# Patient Record
Sex: FEMALE | Race: WHITE | NOT HISPANIC OR LATINO | Employment: FULL TIME | URBAN - METROPOLITAN AREA
[De-identification: names, ages, dates, MRNs, and addresses within clinical notes are randomized per-mention and may not be internally consistent; named-entity substitution may affect disease eponyms.]

---

## 2017-02-22 ENCOUNTER — CLINICAL SUPPORT (OUTPATIENT)
Dept: FAMILY MEDICINE CLINIC | Facility: CLINIC | Age: 32
End: 2017-02-22

## 2017-02-22 DIAGNOSIS — E53.8 VITAMIN B12 DEFICIENCY: Primary | ICD-10-CM

## 2017-02-22 PROCEDURE — 96372 THER/PROPH/DIAG INJ SC/IM: CPT | Performed by: FAMILY MEDICINE

## 2017-02-22 RX ORDER — CYANOCOBALAMIN 1000 UG/ML
1000 INJECTION, SOLUTION INTRAMUSCULAR; SUBCUTANEOUS
Status: DISCONTINUED | OUTPATIENT
Start: 2017-02-22 | End: 2018-05-16 | Stop reason: HOSPADM

## 2017-02-22 RX ADMIN — CYANOCOBALAMIN 1000 MCG: 1000 INJECTION, SOLUTION INTRAMUSCULAR; SUBCUTANEOUS at 12:02

## 2017-05-11 ENCOUNTER — OFFICE VISIT (OUTPATIENT)
Dept: FAMILY MEDICINE CLINIC | Facility: CLINIC | Age: 32
End: 2017-05-11

## 2017-05-11 VITALS — SYSTOLIC BLOOD PRESSURE: 120 MMHG | DIASTOLIC BLOOD PRESSURE: 78 MMHG | BODY MASS INDEX: 23.56 KG/M2 | WEIGHT: 133 LBS

## 2017-05-11 DIAGNOSIS — E03.9 HYPOTHYROIDISM, UNSPECIFIED TYPE: ICD-10-CM

## 2017-05-11 DIAGNOSIS — K21.9 GASTROESOPHAGEAL REFLUX DISEASE WITHOUT ESOPHAGITIS: ICD-10-CM

## 2017-05-11 DIAGNOSIS — G43.009 NONINTRACTABLE MIGRAINE, UNSPECIFIED MIGRAINE TYPE: Primary | ICD-10-CM

## 2017-05-11 PROCEDURE — 99214 OFFICE O/P EST MOD 30 MIN: CPT | Performed by: PHYSICIAN ASSISTANT

## 2017-05-11 RX ORDER — LEVOTHYROXINE SODIUM 0.07 MG/1
75 TABLET ORAL DAILY
Qty: 30 TABLET | Refills: 11 | Status: SHIPPED | OUTPATIENT
Start: 2017-05-11 | End: 2018-05-16 | Stop reason: HOSPADM

## 2017-05-11 RX ORDER — SUMATRIPTAN 100 MG/1
100 TABLET, FILM COATED ORAL ONCE AS NEEDED
Qty: 9 TABLET | Refills: 1 | Status: SHIPPED | OUTPATIENT
Start: 2017-05-11 | End: 2018-05-16 | Stop reason: HOSPADM

## 2017-05-11 RX ORDER — SUMATRIPTAN 100 MG/1
100 TABLET, FILM COATED ORAL ONCE AS NEEDED
Qty: 9 TABLET | Refills: 1 | Status: SHIPPED | OUTPATIENT
Start: 2017-05-11 | End: 2017-05-11 | Stop reason: SDUPTHER

## 2017-05-11 RX ORDER — OMEPRAZOLE 20 MG/1
20 CAPSULE, DELAYED RELEASE ORAL DAILY
Qty: 30 CAPSULE | Refills: 1 | Status: SHIPPED | OUTPATIENT
Start: 2017-05-11 | End: 2018-05-16 | Stop reason: HOSPADM

## 2017-09-06 ENCOUNTER — LAB (OUTPATIENT)
Dept: LAB | Facility: HOSPITAL | Age: 32
End: 2017-09-06

## 2017-09-06 DIAGNOSIS — E03.9 HYPOTHYROIDISM, UNSPECIFIED TYPE: ICD-10-CM

## 2017-09-06 DIAGNOSIS — E03.9 HYPOTHYROIDISM, UNSPECIFIED TYPE: Primary | ICD-10-CM

## 2017-09-06 LAB
T4 FREE SERPL-MCNC: 1.32 NG/DL (ref 0.89–1.76)
TSH SERPL DL<=0.05 MIU/L-ACNC: 2.45 MIU/ML (ref 0.35–5.35)

## 2017-09-06 PROCEDURE — 36415 COLL VENOUS BLD VENIPUNCTURE: CPT

## 2017-09-06 PROCEDURE — 84439 ASSAY OF FREE THYROXINE: CPT | Performed by: NURSE PRACTITIONER

## 2017-09-06 PROCEDURE — 84443 ASSAY THYROID STIM HORMONE: CPT | Performed by: NURSE PRACTITIONER

## 2017-09-11 RX ORDER — AZITHROMYCIN 250 MG/1
TABLET, FILM COATED ORAL
Qty: 5 TABLET | Refills: 0 | Status: SHIPPED | OUTPATIENT
Start: 2017-09-11 | End: 2018-01-10

## 2017-10-04 ENCOUNTER — TRANSCRIBE ORDERS (OUTPATIENT)
Dept: ADMINISTRATIVE | Facility: HOSPITAL | Age: 32
End: 2017-10-04

## 2017-10-04 DIAGNOSIS — N63.0 BREAST LUMP: Primary | ICD-10-CM

## 2017-10-04 DIAGNOSIS — B02.9 HERPES ZOSTER WITHOUT COMPLICATION: Primary | ICD-10-CM

## 2017-10-04 RX ORDER — FAMCICLOVIR 500 MG/1
500 TABLET ORAL 3 TIMES DAILY
Qty: 21 TABLET | Refills: 0 | Status: SHIPPED | OUTPATIENT
Start: 2017-10-04 | End: 2017-10-04 | Stop reason: SDUPTHER

## 2017-10-04 RX ORDER — ACYCLOVIR 50 MG/G
OINTMENT TOPICAL
Qty: 15 G | Refills: 0 | Status: SHIPPED | OUTPATIENT
Start: 2017-10-04 | End: 2017-10-04 | Stop reason: SDUPTHER

## 2017-10-04 RX ORDER — FAMCICLOVIR 500 MG/1
500 TABLET ORAL 3 TIMES DAILY
Qty: 21 TABLET | Refills: 0 | Status: SHIPPED | OUTPATIENT
Start: 2017-10-04 | End: 2018-01-10

## 2017-10-04 RX ORDER — ACYCLOVIR 50 MG/G
OINTMENT TOPICAL
Qty: 15 G | Refills: 0 | Status: SHIPPED | OUTPATIENT
Start: 2017-10-04 | End: 2018-01-10

## 2017-10-05 ENCOUNTER — HOSPITAL ENCOUNTER (OUTPATIENT)
Dept: MAMMOGRAPHY | Facility: HOSPITAL | Age: 32
Discharge: HOME | End: 2017-10-05
Attending: OBSTETRICS & GYNECOLOGY | Admitting: OBSTETRICS & GYNECOLOGY

## 2017-10-05 ENCOUNTER — HOSPITAL ENCOUNTER (OUTPATIENT)
Dept: ULTRASOUND IMAGING | Facility: HOSPITAL | Age: 32
Discharge: HOME | End: 2017-10-05

## 2017-10-05 ENCOUNTER — TRANSCRIBE ORDERS (OUTPATIENT)
Dept: MAMMOGRAPHY | Facility: HOSPITAL | Age: 32
End: 2017-10-05

## 2017-10-05 DIAGNOSIS — N63.0 BREAST LUMP: ICD-10-CM

## 2017-10-05 DIAGNOSIS — R92.8 ABNORMAL MAMMOGRAM: Primary | ICD-10-CM

## 2017-10-05 PROCEDURE — G0279 TOMOSYNTHESIS, MAMMO: HCPCS

## 2017-10-05 PROCEDURE — 76642 ULTRASOUND BREAST LIMITED: CPT

## 2017-10-05 PROCEDURE — G0204 DX MAMMO INCL CAD BI: HCPCS

## 2017-10-06 PROCEDURE — 77066 DX MAMMO INCL CAD BI: CPT | Performed by: RADIOLOGY

## 2017-10-06 PROCEDURE — 76642 ULTRASOUND BREAST LIMITED: CPT | Performed by: RADIOLOGY

## 2017-10-06 PROCEDURE — 77062 BREAST TOMOSYNTHESIS BI: CPT | Performed by: RADIOLOGY

## 2017-10-23 ENCOUNTER — APPOINTMENT (OUTPATIENT)
Dept: MAMMOGRAPHY | Facility: HOSPITAL | Age: 32
End: 2017-10-23
Attending: OBSTETRICS & GYNECOLOGY

## 2017-11-05 DIAGNOSIS — N91.2 AMENORRHEA: Primary | ICD-10-CM

## 2017-11-06 ENCOUNTER — LAB (OUTPATIENT)
Dept: LAB | Facility: HOSPITAL | Age: 32
End: 2017-11-06

## 2017-11-06 DIAGNOSIS — N91.2 AMENORRHEA: Primary | ICD-10-CM

## 2017-11-06 LAB — HCG INTACT+B SERPL-ACNC: 756 MIU/ML

## 2017-11-06 PROCEDURE — 36415 COLL VENOUS BLD VENIPUNCTURE: CPT

## 2017-11-06 PROCEDURE — 84702 CHORIONIC GONADOTROPIN TEST: CPT | Performed by: HOSPITALIST

## 2017-11-09 ENCOUNTER — LAB (OUTPATIENT)
Dept: LAB | Facility: HOSPITAL | Age: 32
End: 2017-11-09

## 2017-11-09 DIAGNOSIS — N91.2 AMENORRHEA: ICD-10-CM

## 2017-11-09 DIAGNOSIS — N91.2 AMENORRHEA: Primary | ICD-10-CM

## 2017-11-09 LAB
HCG INTACT+B SERPL-ACNC: 3118 MIU/ML
PROGEST SERPL-MCNC: 38.9 NG/ML

## 2017-11-09 PROCEDURE — 36415 COLL VENOUS BLD VENIPUNCTURE: CPT

## 2017-11-09 PROCEDURE — 84702 CHORIONIC GONADOTROPIN TEST: CPT | Performed by: HOSPITALIST

## 2017-11-09 PROCEDURE — 84144 ASSAY OF PROGESTERONE: CPT | Performed by: HOSPITALIST

## 2017-12-12 RX ORDER — OSELTAMIVIR PHOSPHATE 75 MG/1
75 CAPSULE ORAL 2 TIMES DAILY
Qty: 10 CAPSULE | Refills: 0 | Status: SHIPPED | OUTPATIENT
Start: 2017-12-12 | End: 2018-05-16 | Stop reason: HOSPADM

## 2017-12-28 ENCOUNTER — LAB REQUISITION (OUTPATIENT)
Dept: LAB | Facility: HOSPITAL | Age: 32
End: 2017-12-28

## 2017-12-28 DIAGNOSIS — Z00.00 ROUTINE GENERAL MEDICAL EXAMINATION AT A HEALTH CARE FACILITY: ICD-10-CM

## 2017-12-28 DIAGNOSIS — Z13.79 ENCOUNTER FOR OTHER SCREENING FOR GENETIC AND CHROMOSOMAL ANOMALIES: ICD-10-CM

## 2017-12-28 PROCEDURE — 36415 COLL VENOUS BLD VENIPUNCTURE: CPT

## 2018-01-08 ENCOUNTER — LAB (OUTPATIENT)
Dept: LAB | Facility: HOSPITAL | Age: 33
End: 2018-01-08

## 2018-01-08 DIAGNOSIS — E03.9 HYPOTHYROIDISM, UNSPECIFIED TYPE: ICD-10-CM

## 2018-01-08 DIAGNOSIS — E03.9 HYPOTHYROIDISM, UNSPECIFIED TYPE: Primary | ICD-10-CM

## 2018-01-08 LAB
T4 FREE SERPL-MCNC: 1.12 NG/DL (ref 0.89–1.76)
TSH SERPL DL<=0.05 MIU/L-ACNC: 2.2 MIU/ML (ref 0.35–5.35)

## 2018-01-08 PROCEDURE — 36415 COLL VENOUS BLD VENIPUNCTURE: CPT

## 2018-01-08 PROCEDURE — 84443 ASSAY THYROID STIM HORMONE: CPT

## 2018-01-08 PROCEDURE — 84439 ASSAY OF FREE THYROXINE: CPT

## 2018-01-10 ENCOUNTER — OFFICE VISIT (OUTPATIENT)
Dept: FAMILY MEDICINE CLINIC | Facility: CLINIC | Age: 33
End: 2018-01-10

## 2018-01-10 VITALS
BODY MASS INDEX: 23.81 KG/M2 | HEIGHT: 63 IN | TEMPERATURE: 98.8 F | HEART RATE: 98 BPM | DIASTOLIC BLOOD PRESSURE: 80 MMHG | WEIGHT: 134.4 LBS | OXYGEN SATURATION: 98 % | SYSTOLIC BLOOD PRESSURE: 122 MMHG

## 2018-01-10 DIAGNOSIS — J40 BRONCHITIS: Primary | ICD-10-CM

## 2018-01-10 PROCEDURE — 99213 OFFICE O/P EST LOW 20 MIN: CPT | Performed by: PHYSICIAN ASSISTANT

## 2018-01-10 RX ORDER — PREDNISONE 20 MG/1
20 TABLET ORAL 2 TIMES DAILY
Qty: 14 TABLET | Refills: 0 | Status: SHIPPED | OUTPATIENT
Start: 2018-01-10 | End: 2018-05-16 | Stop reason: HOSPADM

## 2018-01-10 RX ORDER — AZITHROMYCIN 250 MG/1
TABLET, FILM COATED ORAL
Qty: 5 TABLET | Refills: 0 | Status: SHIPPED | OUTPATIENT
Start: 2018-01-10 | End: 2018-05-16 | Stop reason: HOSPADM

## 2018-01-11 NOTE — PROGRESS NOTES
Christin West is a 32 y.o. female    Cough   This is a new problem. The current episode started more than 1 year ago. The problem has been gradually worsening. The cough is productive of purulent sputum and productive of sputum. Associated symptoms include ear congestion, ear pain, headaches, nasal congestion, postnasal drip, rhinorrhea and wheezing. Pertinent negatives include no chills, fever, sore throat or shortness of breath. The symptoms are aggravated by lying down. The treatment provided moderate relief.       The following portions of the patient's history were reviewed and updated as appropriate: allergies, current medications, past social history and problem list    Review of Systems   Constitutional: Negative for chills, fatigue and fever.   HENT: Positive for congestion, ear pain, postnasal drip, rhinorrhea and sinus pressure. Negative for sore throat.    Eyes: Positive for pain.   Respiratory: Positive for cough and wheezing. Negative for shortness of breath.    Neurological: Positive for headaches. Negative for dizziness.   Hematological: Negative for adenopathy.       Objective     Vitals:    01/10/18 0942   BP: 122/80   Pulse: 98   Temp: 98.8 °F (37.1 °C)   SpO2: 98%       Physical Exam   Constitutional: She appears well-developed and well-nourished. No distress.   HENT:   Head: Normocephalic and atraumatic.   Right Ear: Tympanic membrane and ear canal normal.   Left Ear: Tympanic membrane and ear canal normal.   Nose: Mucosal edema, rhinorrhea and sinus tenderness present. Right sinus exhibits maxillary sinus tenderness and frontal sinus tenderness. Left sinus exhibits maxillary sinus tenderness and frontal sinus tenderness.   Mouth/Throat: Oropharynx is clear and moist. No oropharyngeal exudate.   Eyes: Pupils are equal, round, and reactive to light.   Neck: Neck supple. No JVD present.   Cardiovascular: Normal rate, regular rhythm and normal heart sounds.    No murmur  heard.  Pulmonary/Chest: Effort normal and breath sounds normal. No stridor. No respiratory distress.   Musculoskeletal: She exhibits no edema.   Lymphadenopathy:     She has no cervical adenopathy.   Skin: She is not diaphoretic.   Nursing note and vitals reviewed.      Assessment/Plan     Diagnoses and all orders for this visit:    Bronchitis  -     azithromycin (ZITHROMAX Z-LUKAS) 250 MG tablet; Take 2 tablets the first day, then 1 tablet daily for 4 days.  -     predniSONE (DELTASONE) 20 MG tablet; Take 1 tablet by mouth 2 (Two) Times a Day.    Tylenol Motrin for fever

## 2018-01-26 ENCOUNTER — HOSPITAL ENCOUNTER (OUTPATIENT)
Dept: GENERAL RADIOLOGY | Facility: HOSPITAL | Age: 33
Discharge: HOME | End: 2018-01-26
Attending: OBSTETRICS & GYNECOLOGY | Admitting: OBSTETRICS & GYNECOLOGY

## 2018-01-26 ENCOUNTER — TRANSCRIBE ORDERS (OUTPATIENT)
Dept: ADMINISTRATIVE | Facility: HOSPITAL | Age: 33
End: 2018-01-26

## 2018-01-26 DIAGNOSIS — R07.1 PAINFUL RESPIRATION: ICD-10-CM

## 2018-01-26 DIAGNOSIS — R07.1 PAINFUL RESPIRATION: Primary | ICD-10-CM

## 2018-01-26 PROCEDURE — 71046 X-RAY EXAM CHEST 2 VIEWS: CPT

## 2018-02-02 ENCOUNTER — LAB (OUTPATIENT)
Dept: LAB | Facility: HOSPITAL | Age: 33
End: 2018-02-02

## 2018-02-02 DIAGNOSIS — Z34.80 PRENATAL CARE OF MULTIGRAVIDA, ANTEPARTUM: Primary | ICD-10-CM

## 2018-02-02 PROCEDURE — 82105 ALPHA-FETOPROTEIN SERUM: CPT

## 2018-02-02 PROCEDURE — 36415 COLL VENOUS BLD VENIPUNCTURE: CPT

## 2018-02-05 LAB — Lab: NORMAL

## 2018-04-13 ENCOUNTER — TRANSCRIBE ORDERS (OUTPATIENT)
Dept: ADMINISTRATIVE | Facility: HOSPITAL | Age: 33
End: 2018-04-13

## 2018-04-13 DIAGNOSIS — N64.89 BREAST ASYMMETRY: ICD-10-CM

## 2018-04-13 DIAGNOSIS — R92.8 ABNORMAL MAMMOGRAM: Primary | ICD-10-CM

## 2018-04-26 ENCOUNTER — APPOINTMENT (OUTPATIENT)
Dept: ULTRASOUND IMAGING | Facility: HOSPITAL | Age: 33
End: 2018-04-26
Attending: OBSTETRICS & GYNECOLOGY

## 2018-04-30 RX ORDER — PNV 112/IRON/FOLIC/OM3/DHA/EPA 3.33-.33MG
3 TABLET,CHEWABLE ORAL DAILY
Qty: 90 TABLET | Refills: 11 | Status: SHIPPED | OUTPATIENT
Start: 2018-04-30 | End: 2018-05-16 | Stop reason: HOSPADM

## 2018-05-03 ENCOUNTER — HOSPITAL ENCOUNTER (OUTPATIENT)
Dept: ULTRASOUND IMAGING | Facility: HOSPITAL | Age: 33
Discharge: HOME | End: 2018-05-03
Attending: OBSTETRICS & GYNECOLOGY | Admitting: OBSTETRICS & GYNECOLOGY

## 2018-05-03 DIAGNOSIS — N64.89 BREAST ASYMMETRY: ICD-10-CM

## 2018-05-03 PROCEDURE — 76642 ULTRASOUND BREAST LIMITED: CPT

## 2018-05-03 PROCEDURE — 76642 ULTRASOUND BREAST LIMITED: CPT | Performed by: RADIOLOGY

## 2018-05-15 ENCOUNTER — HOSPITAL ENCOUNTER (OUTPATIENT)
Facility: HOSPITAL | Age: 33
Setting detail: OBSERVATION
Discharge: HOME | End: 2018-05-16
Attending: OBSTETRICS & GYNECOLOGY | Admitting: OBSTETRICS & GYNECOLOGY

## 2018-05-15 LAB
BILIRUB UR QL STRIP: NEGATIVE
CLARITY UR: CLEAR
COLOR UR: YELLOW
GLUCOSE UR STRIP-MCNC: NEGATIVE MG/DL
HGB UR QL STRIP.AUTO: NEGATIVE
KETONES UR QL STRIP: NEGATIVE
LEUKOCYTE ESTERASE UR QL STRIP.AUTO: NEGATIVE
NITRITE UR QL STRIP: NEGATIVE
PH UR STRIP.AUTO: 6.5 [PH] (ref 5–8)
PROT UR QL STRIP: NEGATIVE
SP GR UR STRIP: 1.01 (ref 1–1.03)
UROBILINOGEN UR QL STRIP: NORMAL

## 2018-05-15 PROCEDURE — G0378 HOSPITAL OBSERVATION PER HR: HCPCS

## 2018-05-15 PROCEDURE — 59025 FETAL NON-STRESS TEST: CPT

## 2018-05-15 PROCEDURE — 96360 HYDRATION IV INFUSION INIT: CPT

## 2018-05-15 PROCEDURE — 81003 URINALYSIS AUTO W/O SCOPE: CPT | Performed by: OBSTETRICS & GYNECOLOGY

## 2018-05-15 PROCEDURE — 96361 HYDRATE IV INFUSION ADD-ON: CPT

## 2018-05-15 RX ORDER — LEVOTHYROXINE SODIUM 0.07 MG/1
75 TABLET ORAL NIGHTLY
Status: DISCONTINUED | OUTPATIENT
Start: 2018-05-15 | End: 2018-05-16 | Stop reason: HOSPADM

## 2018-05-15 RX ORDER — NIFEDIPINE 10 MG/1
10 CAPSULE ORAL
Status: DISCONTINUED | OUTPATIENT
Start: 2018-05-15 | End: 2018-05-16 | Stop reason: HOSPADM

## 2018-05-15 RX ORDER — NIFEDIPINE 10 MG/1
10 CAPSULE ORAL ONCE
Status: COMPLETED | OUTPATIENT
Start: 2018-05-15 | End: 2018-05-15

## 2018-05-15 RX ORDER — FAMOTIDINE 20 MG/1
20 TABLET, FILM COATED ORAL 2 TIMES DAILY PRN
Status: DISCONTINUED | OUTPATIENT
Start: 2018-05-15 | End: 2018-05-16 | Stop reason: HOSPADM

## 2018-05-15 RX ORDER — SODIUM CHLORIDE, SODIUM LACTATE, POTASSIUM CHLORIDE, CALCIUM CHLORIDE 600; 310; 30; 20 MG/100ML; MG/100ML; MG/100ML; MG/100ML
125 INJECTION, SOLUTION INTRAVENOUS CONTINUOUS
Status: DISCONTINUED | OUTPATIENT
Start: 2018-05-15 | End: 2018-05-16 | Stop reason: HOSPADM

## 2018-05-15 RX ORDER — CALCIUM CARBONATE 200(500)MG
3 TABLET,CHEWABLE ORAL 3 TIMES DAILY PRN
Status: DISCONTINUED | OUTPATIENT
Start: 2018-05-15 | End: 2018-05-16 | Stop reason: HOSPADM

## 2018-05-15 RX ADMIN — LEVOTHYROXINE SODIUM 75 MCG: 0.07 TABLET ORAL at 22:03

## 2018-05-15 RX ADMIN — FAMOTIDINE 20 MG: 20 TABLET ORAL at 23:25

## 2018-05-15 RX ADMIN — SODIUM CHLORIDE, POTASSIUM CHLORIDE, SODIUM LACTATE AND CALCIUM CHLORIDE 125 ML/HR: 600; 310; 30; 20 INJECTION, SOLUTION INTRAVENOUS at 23:23

## 2018-05-15 RX ADMIN — SODIUM CHLORIDE, POTASSIUM CHLORIDE, SODIUM LACTATE AND CALCIUM CHLORIDE 125 ML/HR: 600; 310; 30; 20 INJECTION, SOLUTION INTRAVENOUS at 16:09

## 2018-05-15 RX ADMIN — NIFEDIPINE 10 MG: 10 CAPSULE, LIQUID FILLED ORAL at 19:34

## 2018-05-15 RX ADMIN — NIFEDIPINE 10 MG: 10 CAPSULE, LIQUID FILLED ORAL at 15:38

## 2018-05-15 RX ADMIN — NIFEDIPINE 10 MG: 10 CAPSULE, LIQUID FILLED ORAL at 23:23

## 2018-05-15 RX ADMIN — SODIUM CHLORIDE, POTASSIUM CHLORIDE, SODIUM LACTATE AND CALCIUM CHLORIDE 500 ML: 600; 310; 30; 20 INJECTION, SOLUTION INTRAVENOUS at 14:48

## 2018-05-15 RX ADMIN — SODIUM CHLORIDE, POTASSIUM CHLORIDE, SODIUM LACTATE AND CALCIUM CHLORIDE 125 ML/HR: 600; 310; 30; 20 INJECTION, SOLUTION INTRAVENOUS at 18:58

## 2018-05-15 NOTE — H&P
History and Physical:    Subjective     Chief Complaint   Patient presents with   • Contractions     sent from office       Regla West is a 32 y.o. year old  with an Estimated Date of Delivery: 18 currently at 30w6d presenting to office today c/o frequent contractions since 0530 this am. Pt stated she was having approx 10 ctx/hour. Pt increased water intake and they improved slightly. Denies vag bldg, lof. Reports good Fm. Had U/S in office, cervical length 3.8 cm. CCUA negative.     Prenatal care has been with Genoveva Sanders MD.  It has been significant for hypothyroidism.      Review of Systems  Pertinent items are noted in HPI.     Past Medical History:   Diagnosis Date   • Anemia     Taking iron   • Disease of thyroid gland     Takes synthroid   • Headache    • Pain in joint, lower leg      Past Surgical History:   Procedure Laterality Date   • SEPTOPLASTY         • WISDOM TOOTH EXTRACTION       Family History   Problem Relation Age of Onset   • Hyperlipidemia Mother    • Hypertension Father    • Hyperlipidemia Father    • Thyroid disease Father    • Breast cancer Neg Hx    • Ovarian cancer Neg Hx      Social History   Substance Use Topics   • Smoking status: Never Smoker   • Smokeless tobacco: Never Used   • Alcohol use Yes     Facility-Administered Medications Prior to Admission   Medication Dose Route Frequency Provider Last Rate Last Dose   • cyanocobalamin injection 1,000 mcg  1,000 mcg Intramuscular Q28 Days Santi Apodaca MD   1,000 mcg at 16 1019   • cyanocobalamin injection 1,000 mcg  1,000 mcg Intramuscular Q28 Days Santi Apodaca MD   1,000 mcg at 10/26/16 0809   • cyanocobalamin injection 1,000 mcg  1,000 mcg Intramuscular Q28 Days YANN Diana   1,000 mcg at 16 0855   • cyanocobalamin injection 1,000 mcg  1,000 mcg Intramuscular Q28 Days YANN Diana   1,000 mcg at 17 1202     Prescriptions Prior to Admission  "  Medication Sig Dispense Refill Last Dose   • levothyroxine (SYNTHROID, LEVOTHROID) 75 MCG tablet Take 1 tablet by mouth Daily. 30 tablet 11 5/15/2018 at Unknown time   • Prenatal Vit-Fe Phos-FA-Omega (VITAFOL GUMMIES) 3.33-0.333-34.8 MG chewable tablet Chew 3 tablets Daily. 90 tablet 11 5/15/2018 at Unknown time   • ALPRAZolam (XANAX) 0.5 MG tablet Take 1 tablet by mouth Every 6-8 Hours. 16 tablet 0 Taking   • azithromycin (ZITHROMAX Z-LUKAS) 250 MG tablet Take 2 tablets the first day, then 1 tablet daily for 4 days. 5 tablet 0    • HYDROcod Polst-CPM Polst ER (TUSSIONEX PENNKINETIC ER) 10-8 MG/5ML ER suspension Take 5 mL by mouth Every 12 (Twelve) Hours As Needed for Cough. 115 mL 0 Not Taking   • metaxalone (SKELAXIN) 800 MG tablet Take 1 tablet by mouth 3 (three) times a day as needed for muscle spasms. 30 tablet 11 Taking Differently   • omeprazole (PRILOSEC) 20 MG capsule Take 1 capsule by mouth Daily. 30 capsule 1    • oseltamivir (TAMIFLU) 75 MG capsule Take 1 capsule by mouth 2 (Two) Times a Day. 10 capsule 0    • predniSONE (DELTASONE) 20 MG tablet Take 1 tablet by mouth 2 (Two) Times a Day. 14 tablet 0    • SUMAtriptan (IMITREX) 100 MG tablet Take 1 tablet by mouth 1 (One) Time As Needed for migraine for up to 1 dose. 9 tablet 1      Allergies:  Macrobid [nitrofurantoin monohyd macro]  OB History    Para Term  AB Living   2 1 1   1   SAB TAB Ectopic Molar Multiple Live Births        1      # Outcome Date GA Lbr Kyle/2nd Weight Sex Delivery Anes PTL Lv   2 Current            1 Term 09/21/15 37w2d    Vag-Vacuum   LUCIA            Objective     /69   Pulse 78   Temp 98 °F (36.7 °C) (Oral)   Resp 16   Ht 160 cm (63\")   Wt 72.1 kg (159 lb)   LMP 2017 Comment: denies preg  BMI 28.17 kg/m²     Physical Exam    General:  No acute distress           Abdomen: Gravid, nontender       FHT's: reactive    Cervix: closed   [place]: Contraction are q 5-7 min in office     Lab Review   External " Prenatal Results         Pregnancy Outside Results - these were transcribed from office records.  See scanned records for details. Date Time   Hgb  9.7 g/dL (L) 09/22/15 0708   Hct  28.4 % (L) 09/22/15 0708   ABO      Rh      Antibody Screen      Glucose Fasting GTT      Glucose Tolerance Test 1 hour      Glucose Tolerance Test 3 hour      Gonorrhea (discrete)      Chlamydia (discrete)      RPR      VDRL      Syphillis Antibody      Rubella      HBsAg      Herpes Simplex Virus PCR      Herpes Simplex VIrus Culture      HIV      Hep C RNA Quant PCR      Hep C Antibody      AFP      Group B Strep      GBS Susceptibility to Clindamycin      GBS Susceptibility to Eythromycin      Fetal Fibronectin      Genetic Testing, Maternal Blood      Drug Screening Date Time   Urine Drug Screen      Amphetamine Screen      Barbiturate Screen      Benzodiazepine Screen      Methadone Screen      Phencyclidine Screen      Opiates Screen      THC Screen      Cocaine Screen      Propoxyphene Screen      Buprenorphine Screen      Methamphetamine Screen      Oxycodone Screen      Tryicyclic Antidepressants Screen                Legend: ^: Historical                         Assessment/Plan     ASSESSMENT  1. IUP at 30w6d  2. Ctx's, rule out umer     PLAN               1.  Admit to obs, continuous monitoring, hydrate         Carmen Kaur, APRN  5/15/2018@

## 2018-05-15 NOTE — PLAN OF CARE
Problem:  Labor (Adult,Obstetrics,Pediatric)  Goal: Signs and Symptoms of Listed Potential Problems Will be Absent, Minimized or Managed ( Labor)  Outcome: Ongoing (interventions implemented as appropriate)   05/15/18 3238   Goal/Outcome Evaluation   Problems Assessed ( Labor) all   Problems Present ( Labor) none

## 2018-05-16 ENCOUNTER — APPOINTMENT (OUTPATIENT)
Dept: WOMENS IMAGING | Facility: HOSPITAL | Age: 33
End: 2018-05-16

## 2018-05-16 VITALS
DIASTOLIC BLOOD PRESSURE: 57 MMHG | WEIGHT: 159 LBS | SYSTOLIC BLOOD PRESSURE: 99 MMHG | RESPIRATION RATE: 15 BRPM | HEIGHT: 63 IN | TEMPERATURE: 98.2 F | HEART RATE: 88 BPM | BODY MASS INDEX: 28.17 KG/M2

## 2018-05-16 PROBLEM — O47.00 PRETERM CONTRACTIONS: Status: ACTIVE | Noted: 2018-05-16

## 2018-05-16 PROCEDURE — 76811 OB US DETAILED SNGL FETUS: CPT | Performed by: OBSTETRICS & GYNECOLOGY

## 2018-05-16 PROCEDURE — 59025 FETAL NON-STRESS TEST: CPT

## 2018-05-16 PROCEDURE — G0378 HOSPITAL OBSERVATION PER HR: HCPCS

## 2018-05-16 PROCEDURE — 96361 HYDRATE IV INFUSION ADD-ON: CPT

## 2018-05-16 PROCEDURE — 76811 OB US DETAILED SNGL FETUS: CPT

## 2018-05-16 RX ADMIN — NIFEDIPINE 10 MG: 10 CAPSULE, LIQUID FILLED ORAL at 07:46

## 2018-05-16 RX ADMIN — SODIUM CHLORIDE, POTASSIUM CHLORIDE, SODIUM LACTATE AND CALCIUM CHLORIDE 125 ML/HR: 600; 310; 30; 20 INJECTION, SOLUTION INTRAVENOUS at 05:27

## 2018-05-16 NOTE — DISCHARGE SUMMARY
Date of Discharge:  2018    Discharge Diagnosis:    contractions    Presenting Problem/History of Present Illness   contractions [O47.9]       Hospital Course  Patient is a 32 y.o. female presented with painful contractions to office.  U/A was negative and cervical length was normal.  She was admitted for hydration and po procardia with improvement of contractions over the next 18 hours.  PDC scan was normal.  She was d/c with prn procardia to f/u in one week.    Procedures Performed  Procedure(s):  Fetal monitoring  Po tocolysis  PDC scan         Condition on Discharge:  stable    Vital Signs  Temp:  [97.9 °F (36.6 °C)-98.8 °F (37.1 °C)] 98.2 °F (36.8 °C)  Heart Rate:  [77-88] 88  Resp:  [15-18] 15  BP: ()/(50-69) 99/57    Physical Exam:   NST rx    Discharge Disposition  Home or Self Care    Discharge Medications  There are no discharge medications for this patient.       Discharge Diet: Regular    Activity at Discharge: As tolerated    Follow-up Appointments  Future Appointments  Date Time Provider Department Center   7/10/2018 9:30 AM PAT 1 [facility]                Genoveva Sandesr MD

## 2018-05-16 NOTE — PROGRESS NOTES
"2018  HD:0  32 y.o. yo female  at 31w0d    Subjective   Regla has had less frequent contractions over night.  No bleeding or change in d/c       Objective   Temp: Temp:  [97.9 °F (36.6 °C)-98.8 °F (37.1 °C)] 98.2 °F (36.8 °C) Temp src: Oral   BP: BP: ()/(50-69) 99/57        Pulse: Heart Rate:  [77-88] 88  RR: Resp:  [15-18] 15    General:  nad   Abdomen: Gravid, nontender   Cvx: Internal os is closed.     Lab Results   Component Value Date    WBC 14.19 (H) 2015    HGB 9.7 (L) 2015    HCT 28.4 (L) 2015    MCV 91.6 2015     (L) 2015    ABORH O Rh Positive 2015                     Assessment  1.   32 y.o. yo female  at 31w0d  2.     contractions without cervical change.  3.    Hypoechoic area on umbilical cord in office u/s    Plan  1.   contractions - stable on prn procardia  2.  Area on umb. Cord on in office scan \" oblong hypoechoic area dolly 2cm) - will have PDC double check.  3.  DC home after PDC scan.    This note has been electronically signed.    Genoveva Sanders MD  May 16, 2018  "

## 2018-05-16 NOTE — POST-PROCEDURE NOTE
MFM Ultrasound    Vertex  Size=Dates = 4lb 9oz  ROBERTA normal  S/D normal  BPP 8/8    Cervix 4.0 cm    No hypoechoic area seen.  Cord and cord insertion site appears normal.

## 2018-05-17 ENCOUNTER — TRANSCRIBE ORDERS (OUTPATIENT)
Dept: LAB | Facility: HOSPITAL | Age: 33
End: 2018-05-17

## 2018-05-17 ENCOUNTER — LAB (OUTPATIENT)
Dept: LAB | Facility: HOSPITAL | Age: 33
End: 2018-05-17
Attending: OBSTETRICS & GYNECOLOGY

## 2018-05-17 DIAGNOSIS — Z34.83 PRENATAL CARE, SUBSEQUENT PREGNANCY, THIRD TRIMESTER: ICD-10-CM

## 2018-05-17 DIAGNOSIS — Z34.83 PRENATAL CARE, SUBSEQUENT PREGNANCY, THIRD TRIMESTER: Primary | ICD-10-CM

## 2018-05-17 LAB — FIBRONECTIN FETAL VAG QL: NEGATIVE

## 2018-05-17 PROCEDURE — 82731 ASSAY OF FETAL FIBRONECTIN: CPT

## 2018-06-04 RX ORDER — LEVOTHYROXINE SODIUM 0.07 MG/1
75 TABLET ORAL DAILY
Qty: 90 TABLET | Refills: 1 | Status: SHIPPED | OUTPATIENT
Start: 2018-06-04 | End: 2018-12-11 | Stop reason: SDUPTHER

## 2018-06-11 ENCOUNTER — LAB (OUTPATIENT)
Dept: LAB | Facility: HOSPITAL | Age: 33
End: 2018-06-11

## 2018-06-11 DIAGNOSIS — O99.019 ANTEPARTUM ANEMIA: ICD-10-CM

## 2018-06-11 DIAGNOSIS — O99.019 ANTEPARTUM ANEMIA: Primary | ICD-10-CM

## 2018-06-11 LAB
ERYTHROCYTE [DISTWIDTH] IN BLOOD BY AUTOMATED COUNT: 14.5 % (ref 11.3–14.5)
HCT VFR BLD AUTO: 32.9 % (ref 34.5–44)
HGB BLD-MCNC: 10.4 G/DL (ref 11.5–15.5)
LYMPHOCYTES # BLD AUTO: 1.6 10*3/MM3 (ref 0.6–4.8)
LYMPHOCYTES NFR BLD AUTO: 15.9 % (ref 24–44)
MCH RBC QN AUTO: 28.3 PG (ref 27–31)
MCHC RBC AUTO-ENTMCNC: 31.7 G/DL (ref 32–36)
MCV RBC AUTO: 89 FL (ref 80–99)
MONOCYTES # BLD AUTO: 0.5 10*3/MM3 (ref 0–1)
MONOCYTES NFR BLD AUTO: 5.3 % (ref 0–12)
NEUTROPHILS # BLD AUTO: 7.8 10*3/MM3 (ref 1.5–8.3)
NEUTROPHILS NFR BLD AUTO: 78.8 % (ref 41–71)
PLATELET # BLD AUTO: 200 10*3/MM3 (ref 150–450)
PMV BLD AUTO: 7.4 FL (ref 6–12)
RBC # BLD AUTO: 3.7 10*6/MM3 (ref 3.89–5.14)
WBC NRBC COR # BLD: 9.9 10*3/MM3 (ref 3.5–10.8)

## 2018-06-11 PROCEDURE — 85025 COMPLETE CBC W/AUTO DIFF WBC: CPT

## 2018-06-11 PROCEDURE — 36415 COLL VENOUS BLD VENIPUNCTURE: CPT

## 2018-06-20 RX ORDER — PNV 112/IRON/FOLIC/OM3/DHA/EPA 3.33-.33MG
1 TABLET,CHEWABLE ORAL DAILY
Qty: 90 TABLET | Refills: 1 | Status: SHIPPED | OUTPATIENT
Start: 2018-06-20 | End: 2018-09-13

## 2018-06-21 ENCOUNTER — LAB (OUTPATIENT)
Dept: LAB | Facility: HOSPITAL | Age: 33
End: 2018-06-21

## 2018-06-21 ENCOUNTER — TRANSCRIBE ORDERS (OUTPATIENT)
Dept: LAB | Facility: HOSPITAL | Age: 33
End: 2018-06-21

## 2018-06-21 DIAGNOSIS — Z34.83 PRENATAL CARE, SUBSEQUENT PREGNANCY, THIRD TRIMESTER: ICD-10-CM

## 2018-06-21 DIAGNOSIS — Z34.83 PRENATAL CARE, SUBSEQUENT PREGNANCY, THIRD TRIMESTER: Primary | ICD-10-CM

## 2018-06-21 PROCEDURE — 87081 CULTURE SCREEN ONLY: CPT

## 2018-06-24 LAB — BACTERIA SPEC AEROBE CULT: NORMAL

## 2018-06-28 ENCOUNTER — LAB REQUISITION (OUTPATIENT)
Dept: LAB | Facility: HOSPITAL | Age: 33
End: 2018-06-28

## 2018-06-28 DIAGNOSIS — Z00.00 ROUTINE GENERAL MEDICAL EXAMINATION AT A HEALTH CARE FACILITY: ICD-10-CM

## 2018-06-28 LAB
ALBUMIN SERPL-MCNC: 3.52 G/DL (ref 3.2–4.8)
ALBUMIN/GLOB SERPL: 1.5 G/DL (ref 1.5–2.5)
ALP SERPL-CCNC: 134 U/L (ref 25–100)
ALP SERPL-CCNC: 134 U/L (ref 25–100)
ALT SERPL W P-5'-P-CCNC: 13 U/L (ref 7–40)
ALT SERPL W P-5'-P-CCNC: 13 U/L (ref 7–40)
ANION GAP SERPL CALCULATED.3IONS-SCNC: 10 MMOL/L (ref 3–11)
AST SERPL-CCNC: 19 U/L (ref 0–33)
AST SERPL-CCNC: 19 U/L (ref 0–33)
BASOPHILS # BLD AUTO: 0.02 10*3/MM3 (ref 0–0.2)
BASOPHILS NFR BLD AUTO: 0.3 % (ref 0–1)
BILIRUB SERPL-MCNC: 0.3 MG/DL (ref 0.3–1.2)
BILIRUB SERPL-MCNC: 0.3 MG/DL (ref 0.3–1.2)
BUN BLD-MCNC: 7 MG/DL (ref 9–23)
BUN/CREAT SERPL: 10.9 (ref 7–25)
CALCIUM SPEC-SCNC: 8.8 MG/DL (ref 8.7–10.4)
CHLORIDE SERPL-SCNC: 107 MMOL/L (ref 99–109)
CO2 SERPL-SCNC: 22 MMOL/L (ref 20–31)
CREAT BLD-MCNC: 0.64 MG/DL (ref 0.6–1.3)
CREAT BLD-MCNC: 0.64 MG/DL (ref 0.6–1.3)
DEPRECATED RDW RBC AUTO: 46.8 FL (ref 37–54)
EOSINOPHIL # BLD AUTO: 0.14 10*3/MM3 (ref 0–0.3)
EOSINOPHIL NFR BLD AUTO: 1.9 % (ref 0–3)
ERYTHROCYTE [DISTWIDTH] IN BLOOD BY AUTOMATED COUNT: 14.4 % (ref 11.3–14.5)
GFR SERPL CREATININE-BSD FRML MDRD: 106 ML/MIN/1.73
GLOBULIN UR ELPH-MCNC: 2.4 GM/DL
GLUCOSE BLD-MCNC: 61 MG/DL (ref 70–100)
HCT VFR BLD AUTO: 34.3 % (ref 34.5–44)
HGB BLD-MCNC: 10.8 G/DL (ref 11.5–15.5)
IMM GRANULOCYTES # BLD: 0.04 10*3/MM3 (ref 0–0.03)
IMM GRANULOCYTES NFR BLD: 0.5 % (ref 0–0.6)
LDH SERPL-CCNC: 177 U/L (ref 120–246)
LYMPHOCYTES # BLD AUTO: 1.52 10*3/MM3 (ref 0.6–4.8)
LYMPHOCYTES NFR BLD AUTO: 20.5 % (ref 24–44)
MCH RBC QN AUTO: 28.3 PG (ref 27–31)
MCHC RBC AUTO-ENTMCNC: 31.5 G/DL (ref 32–36)
MCV RBC AUTO: 90 FL (ref 80–99)
MONOCYTES # BLD AUTO: 0.6 10*3/MM3 (ref 0–1)
MONOCYTES NFR BLD AUTO: 8.1 % (ref 0–12)
NEUTROPHILS # BLD AUTO: 5.08 10*3/MM3 (ref 1.5–8.3)
NEUTROPHILS NFR BLD AUTO: 68.7 % (ref 41–71)
PLATELET # BLD AUTO: 155 10*3/MM3 (ref 150–450)
PMV BLD AUTO: 10 FL (ref 6–12)
POTASSIUM BLD-SCNC: 3.7 MMOL/L (ref 3.5–5.5)
PROT SERPL-MCNC: 5.9 G/DL (ref 5.7–8.2)
RBC # BLD AUTO: 3.81 10*6/MM3 (ref 3.89–5.14)
SODIUM BLD-SCNC: 139 MMOL/L (ref 132–146)
TSH SERPL DL<=0.05 MIU/L-ACNC: 3.29 MIU/ML (ref 0.35–5.35)
URATE SERPL-MCNC: 6.2 MG/DL (ref 3.1–7.8)
WBC NRBC COR # BLD: 7.4 10*3/MM3 (ref 3.5–10.8)

## 2018-06-28 PROCEDURE — 84443 ASSAY THYROID STIM HORMONE: CPT | Performed by: OBSTETRICS & GYNECOLOGY

## 2018-06-28 PROCEDURE — 85025 COMPLETE CBC W/AUTO DIFF WBC: CPT | Performed by: OBSTETRICS & GYNECOLOGY

## 2018-06-28 PROCEDURE — 84460 ALANINE AMINO (ALT) (SGPT): CPT | Performed by: OBSTETRICS & GYNECOLOGY

## 2018-06-28 PROCEDURE — 80053 COMPREHEN METABOLIC PANEL: CPT | Performed by: OBSTETRICS & GYNECOLOGY

## 2018-06-28 PROCEDURE — 84550 ASSAY OF BLOOD/URIC ACID: CPT | Performed by: OBSTETRICS & GYNECOLOGY

## 2018-06-28 PROCEDURE — 84075 ASSAY ALKALINE PHOSPHATASE: CPT | Performed by: OBSTETRICS & GYNECOLOGY

## 2018-06-28 PROCEDURE — 83615 LACTATE (LD) (LDH) ENZYME: CPT | Performed by: OBSTETRICS & GYNECOLOGY

## 2018-06-28 PROCEDURE — 84450 TRANSFERASE (AST) (SGOT): CPT | Performed by: OBSTETRICS & GYNECOLOGY

## 2018-06-28 PROCEDURE — 82565 ASSAY OF CREATININE: CPT | Performed by: OBSTETRICS & GYNECOLOGY

## 2018-06-28 PROCEDURE — 82247 BILIRUBIN TOTAL: CPT | Performed by: OBSTETRICS & GYNECOLOGY

## 2018-07-03 ENCOUNTER — ANESTHESIA EVENT (OUTPATIENT)
Dept: LABOR AND DELIVERY | Facility: HOSPITAL | Age: 33
End: 2018-07-03

## 2018-07-03 ENCOUNTER — ANESTHESIA (OUTPATIENT)
Dept: LABOR AND DELIVERY | Facility: HOSPITAL | Age: 33
End: 2018-07-03

## 2018-07-03 ENCOUNTER — HOSPITAL ENCOUNTER (INPATIENT)
Facility: HOSPITAL | Age: 33
LOS: 2 days | Discharge: HOME | End: 2018-07-05
Attending: OBSTETRICS & GYNECOLOGY | Admitting: OBSTETRICS & GYNECOLOGY

## 2018-07-03 VITALS — HEART RATE: 93 BPM | DIASTOLIC BLOOD PRESSURE: 43 MMHG | SYSTOLIC BLOOD PRESSURE: 65 MMHG | OXYGEN SATURATION: 94 %

## 2018-07-03 DIAGNOSIS — Z30.2 REQUEST FOR STERILIZATION: ICD-10-CM

## 2018-07-03 PROBLEM — Z34.90 PREGNANCY: Status: ACTIVE | Noted: 2018-07-03

## 2018-07-03 LAB
ABO GROUP BLD: NORMAL
BLD GP AB SCN SERPL QL: NEGATIVE
DEPRECATED RDW RBC AUTO: 47.7 FL (ref 37–54)
ERYTHROCYTE [DISTWIDTH] IN BLOOD BY AUTOMATED COUNT: 14.8 % (ref 11.3–14.5)
HCT VFR BLD AUTO: 36 % (ref 34.5–44)
HGB BLD-MCNC: 11.3 G/DL (ref 11.5–15.5)
MCH RBC QN AUTO: 27.9 PG (ref 27–31)
MCHC RBC AUTO-ENTMCNC: 31.4 G/DL (ref 32–36)
MCV RBC AUTO: 88.9 FL (ref 80–99)
PLATELET # BLD AUTO: 162 10*3/MM3 (ref 150–450)
PMV BLD AUTO: 11 FL (ref 6–12)
RBC # BLD AUTO: 4.05 10*6/MM3 (ref 3.89–5.14)
RH BLD: POSITIVE
T&S EXPIRATION DATE: NORMAL
WBC NRBC COR # BLD: 8.6 10*3/MM3 (ref 3.5–10.8)

## 2018-07-03 PROCEDURE — 25010000002 METOCLOPRAMIDE PER 10 MG: Performed by: NURSE ANESTHETIST, CERTIFIED REGISTERED

## 2018-07-03 PROCEDURE — 59514 CESAREAN DELIVERY ONLY: CPT | Performed by: OBSTETRICS & GYNECOLOGY

## 2018-07-03 PROCEDURE — 0UB70ZZ EXCISION OF BILATERAL FALLOPIAN TUBES, OPEN APPROACH: ICD-10-PCS | Performed by: OBSTETRICS & GYNECOLOGY

## 2018-07-03 PROCEDURE — 86901 BLOOD TYPING SEROLOGIC RH(D): CPT | Performed by: OBSTETRICS & GYNECOLOGY

## 2018-07-03 PROCEDURE — 25010000002 PROMETHAZINE PER 50 MG: Performed by: OBSTETRICS & GYNECOLOGY

## 2018-07-03 PROCEDURE — 85027 COMPLETE CBC AUTOMATED: CPT | Performed by: OBSTETRICS & GYNECOLOGY

## 2018-07-03 PROCEDURE — 25010000002 ONDANSETRON PER 1 MG: Performed by: OBSTETRICS & GYNECOLOGY

## 2018-07-03 PROCEDURE — 25010000003 MORPHINE PER 10 MG: Performed by: NURSE ANESTHETIST, CERTIFIED REGISTERED

## 2018-07-03 PROCEDURE — 86900 BLOOD TYPING SEROLOGIC ABO: CPT | Performed by: OBSTETRICS & GYNECOLOGY

## 2018-07-03 PROCEDURE — 25010000002 FENTANYL CITRATE (PF) 100 MCG/2ML SOLUTION: Performed by: NURSE ANESTHETIST, CERTIFIED REGISTERED

## 2018-07-03 PROCEDURE — 25010000002 PHENYLEPHRINE PER 1 ML: Performed by: NURSE ANESTHETIST, CERTIFIED REGISTERED

## 2018-07-03 PROCEDURE — 25010000003 CEFAZOLIN IN DEXTROSE 2-4 GM/100ML-% SOLUTION: Performed by: OBSTETRICS & GYNECOLOGY

## 2018-07-03 PROCEDURE — 86850 RBC ANTIBODY SCREEN: CPT | Performed by: OBSTETRICS & GYNECOLOGY

## 2018-07-03 PROCEDURE — 88302 TISSUE EXAM BY PATHOLOGIST: CPT | Performed by: OBSTETRICS & GYNECOLOGY

## 2018-07-03 PROCEDURE — 59025 FETAL NON-STRESS TEST: CPT

## 2018-07-03 PROCEDURE — 25010000002 MIDAZOLAM PER 1 MG: Performed by: NURSE ANESTHETIST, CERTIFIED REGISTERED

## 2018-07-03 PROCEDURE — C1765 ADHESION BARRIER: HCPCS | Performed by: OBSTETRICS & GYNECOLOGY

## 2018-07-03 RX ORDER — PROMETHAZINE HYDROCHLORIDE 12.5 MG/1
12.5 SUPPOSITORY RECTAL EVERY 6 HOURS PRN
Status: DISCONTINUED | OUTPATIENT
Start: 2018-07-03 | End: 2018-07-05 | Stop reason: HOSPADM

## 2018-07-03 RX ORDER — IBUPROFEN 600 MG/1
600 TABLET ORAL EVERY 6 HOURS PRN
Status: DISCONTINUED | OUTPATIENT
Start: 2018-07-03 | End: 2018-07-03

## 2018-07-03 RX ORDER — DIPHENHYDRAMINE HYDROCHLORIDE 50 MG/ML
25 INJECTION INTRAMUSCULAR; INTRAVENOUS NIGHTLY PRN
Status: DISCONTINUED | OUTPATIENT
Start: 2018-07-03 | End: 2018-07-03 | Stop reason: HOSPADM

## 2018-07-03 RX ORDER — SODIUM CHLORIDE 0.9 % (FLUSH) 0.9 %
1-10 SYRINGE (ML) INJECTION AS NEEDED
Status: DISCONTINUED | OUTPATIENT
Start: 2018-07-03 | End: 2018-07-03 | Stop reason: HOSPADM

## 2018-07-03 RX ORDER — OXYTOCIN-SODIUM CHLORIDE 0.9% IV SOLN 30 UNIT/500ML 30-0.9/5 UT/ML-%
650 SOLUTION INTRAVENOUS ONCE
Status: DISCONTINUED | OUTPATIENT
Start: 2018-07-03 | End: 2018-07-03 | Stop reason: HOSPADM

## 2018-07-03 RX ORDER — FAMOTIDINE 10 MG/ML
INJECTION, SOLUTION INTRAVENOUS AS NEEDED
Status: DISCONTINUED | OUTPATIENT
Start: 2018-07-03 | End: 2018-07-03 | Stop reason: SURG

## 2018-07-03 RX ORDER — SODIUM CHLORIDE, SODIUM LACTATE, POTASSIUM CHLORIDE, CALCIUM CHLORIDE 600; 310; 30; 20 MG/100ML; MG/100ML; MG/100ML; MG/100ML
125 INJECTION, SOLUTION INTRAVENOUS CONTINUOUS
Status: DISCONTINUED | OUTPATIENT
Start: 2018-07-03 | End: 2018-07-03 | Stop reason: HOSPADM

## 2018-07-03 RX ORDER — MORPHINE SULFATE 0.5 MG/ML
INJECTION, SOLUTION EPIDURAL; INTRATHECAL; INTRAVENOUS AS NEEDED
Status: DISCONTINUED | OUTPATIENT
Start: 2018-07-03 | End: 2018-07-03 | Stop reason: SURG

## 2018-07-03 RX ORDER — LIDOCAINE HYDROCHLORIDE 10 MG/ML
5 INJECTION, SOLUTION EPIDURAL; INFILTRATION; INTRACAUDAL; PERINEURAL AS NEEDED
Status: DISCONTINUED | OUTPATIENT
Start: 2018-07-03 | End: 2018-07-03 | Stop reason: HOSPADM

## 2018-07-03 RX ORDER — HYDROCODONE BITARTRATE AND ACETAMINOPHEN 5; 325 MG/1; MG/1
2 TABLET ORAL EVERY 4 HOURS PRN
Status: DISCONTINUED | OUTPATIENT
Start: 2018-07-03 | End: 2018-07-05 | Stop reason: HOSPADM

## 2018-07-03 RX ORDER — IBUPROFEN 600 MG/1
600 TABLET ORAL EVERY 6 HOURS PRN
Status: DISCONTINUED | OUTPATIENT
Start: 2018-07-03 | End: 2018-07-05 | Stop reason: HOSPADM

## 2018-07-03 RX ORDER — RANITIDINE 150 MG/1
150 TABLET ORAL 2 TIMES DAILY
COMMUNITY
End: 2018-09-13

## 2018-07-03 RX ORDER — LANOLIN 100 %
OINTMENT (GRAM) TOPICAL
Status: DISCONTINUED | OUTPATIENT
Start: 2018-07-03 | End: 2018-07-05 | Stop reason: HOSPADM

## 2018-07-03 RX ORDER — OXYTOCIN-SODIUM CHLORIDE 0.9% IV SOLN 30 UNIT/500ML 30-0.9/5 UT/ML-%
85 SOLUTION INTRAVENOUS ONCE
Status: DISCONTINUED | OUTPATIENT
Start: 2018-07-03 | End: 2018-07-03 | Stop reason: HOSPADM

## 2018-07-03 RX ORDER — TRISODIUM CITRATE DIHYDRATE AND CITRIC ACID MONOHYDRATE 500; 334 MG/5ML; MG/5ML
30 SOLUTION ORAL ONCE
Status: COMPLETED | OUTPATIENT
Start: 2018-07-03 | End: 2018-07-03

## 2018-07-03 RX ORDER — ONDANSETRON 2 MG/ML
4 INJECTION INTRAMUSCULAR; INTRAVENOUS ONCE
Status: DISCONTINUED | OUTPATIENT
Start: 2018-07-03 | End: 2018-07-03 | Stop reason: HOSPADM

## 2018-07-03 RX ORDER — FENTANYL CITRATE 50 UG/ML
INJECTION, SOLUTION INTRAMUSCULAR; INTRAVENOUS AS NEEDED
Status: DISCONTINUED | OUTPATIENT
Start: 2018-07-03 | End: 2018-07-03 | Stop reason: SURG

## 2018-07-03 RX ORDER — DOCUSATE SODIUM 100 MG/1
100 CAPSULE, LIQUID FILLED ORAL 2 TIMES DAILY PRN
Status: DISCONTINUED | OUTPATIENT
Start: 2018-07-03 | End: 2018-07-05 | Stop reason: HOSPADM

## 2018-07-03 RX ORDER — LEVOTHYROXINE SODIUM 0.03 MG/1
75 TABLET ORAL
Status: DISCONTINUED | OUTPATIENT
Start: 2018-07-04 | End: 2018-07-05 | Stop reason: HOSPADM

## 2018-07-03 RX ORDER — NALOXONE HCL 0.4 MG/ML
0.4 VIAL (ML) INJECTION
Status: ACTIVE | OUTPATIENT
Start: 2018-07-03 | End: 2018-07-04

## 2018-07-03 RX ORDER — DIPHENHYDRAMINE HCL 25 MG
25 CAPSULE ORAL NIGHTLY PRN
Status: DISCONTINUED | OUTPATIENT
Start: 2018-07-03 | End: 2018-07-03 | Stop reason: HOSPADM

## 2018-07-03 RX ORDER — PROMETHAZINE HYDROCHLORIDE 25 MG/1
25 TABLET ORAL EVERY 6 HOURS PRN
Status: DISCONTINUED | OUTPATIENT
Start: 2018-07-03 | End: 2018-07-05 | Stop reason: HOSPADM

## 2018-07-03 RX ORDER — METHYLERGONOVINE MALEATE 0.2 MG/ML
200 INJECTION INTRAVENOUS AS NEEDED
Status: DISCONTINUED | OUTPATIENT
Start: 2018-07-03 | End: 2018-07-03 | Stop reason: HOSPADM

## 2018-07-03 RX ORDER — DOCUSATE SODIUM 250 MG
250 CAPSULE ORAL DAILY
COMMUNITY
End: 2018-09-13

## 2018-07-03 RX ORDER — OXYTOCIN 10 [USP'U]/ML
INJECTION, SOLUTION INTRAMUSCULAR; INTRAVENOUS AS NEEDED
Status: DISCONTINUED | OUTPATIENT
Start: 2018-07-03 | End: 2018-07-03 | Stop reason: SURG

## 2018-07-03 RX ORDER — METOCLOPRAMIDE HYDROCHLORIDE 5 MG/ML
INJECTION INTRAMUSCULAR; INTRAVENOUS AS NEEDED
Status: DISCONTINUED | OUTPATIENT
Start: 2018-07-03 | End: 2018-07-03 | Stop reason: SURG

## 2018-07-03 RX ORDER — OXYCODONE HYDROCHLORIDE AND ACETAMINOPHEN 5; 325 MG/1; MG/1
1 TABLET ORAL EVERY 4 HOURS PRN
Status: DISCONTINUED | OUTPATIENT
Start: 2018-07-03 | End: 2018-07-05 | Stop reason: HOSPADM

## 2018-07-03 RX ORDER — ONDANSETRON 2 MG/ML
4 INJECTION INTRAMUSCULAR; INTRAVENOUS EVERY 6 HOURS PRN
Status: DISCONTINUED | OUTPATIENT
Start: 2018-07-03 | End: 2018-07-05 | Stop reason: HOSPADM

## 2018-07-03 RX ORDER — MISOPROSTOL 200 UG/1
800 TABLET ORAL AS NEEDED
Status: DISCONTINUED | OUTPATIENT
Start: 2018-07-03 | End: 2018-07-03 | Stop reason: HOSPADM

## 2018-07-03 RX ORDER — ACETAMINOPHEN 325 MG/1
650 TABLET ORAL ONCE AS NEEDED
Status: DISCONTINUED | OUTPATIENT
Start: 2018-07-03 | End: 2018-07-03 | Stop reason: HOSPADM

## 2018-07-03 RX ORDER — CARBOPROST TROMETHAMINE 250 UG/ML
250 INJECTION, SOLUTION INTRAMUSCULAR AS NEEDED
Status: DISCONTINUED | OUTPATIENT
Start: 2018-07-03 | End: 2018-07-03 | Stop reason: HOSPADM

## 2018-07-03 RX ORDER — MISOPROSTOL 200 UG/1
800 TABLET ORAL AS NEEDED
Status: DISCONTINUED | OUTPATIENT
Start: 2018-07-03 | End: 2018-07-05 | Stop reason: HOSPADM

## 2018-07-03 RX ORDER — METHYLERGONOVINE MALEATE 0.2 MG/ML
200 INJECTION INTRAVENOUS ONCE
Status: DISCONTINUED | OUTPATIENT
Start: 2018-07-03 | End: 2018-07-05 | Stop reason: HOSPADM

## 2018-07-03 RX ORDER — METOCLOPRAMIDE HYDROCHLORIDE 5 MG/ML
10 INJECTION INTRAMUSCULAR; INTRAVENOUS ONCE AS NEEDED
Status: DISCONTINUED | OUTPATIENT
Start: 2018-07-03 | End: 2018-07-03 | Stop reason: HOSPADM

## 2018-07-03 RX ORDER — IBUPROFEN 600 MG/1
600 TABLET ORAL ONCE AS NEEDED
Status: DISCONTINUED | OUTPATIENT
Start: 2018-07-03 | End: 2018-07-03 | Stop reason: HOSPADM

## 2018-07-03 RX ORDER — NALOXONE HCL 0.4 MG/ML
0.4 VIAL (ML) INJECTION
Status: DISCONTINUED | OUTPATIENT
Start: 2018-07-03 | End: 2018-07-05 | Stop reason: HOSPADM

## 2018-07-03 RX ORDER — BUPIVACAINE HYDROCHLORIDE 5 MG/ML
INJECTION, SOLUTION EPIDURAL; INTRACAUDAL AS NEEDED
Status: DISCONTINUED | OUTPATIENT
Start: 2018-07-03 | End: 2018-07-03 | Stop reason: SURG

## 2018-07-03 RX ORDER — PROMETHAZINE HYDROCHLORIDE 25 MG/ML
12.5 INJECTION, SOLUTION INTRAMUSCULAR; INTRAVENOUS EVERY 6 HOURS PRN
Status: DISCONTINUED | OUTPATIENT
Start: 2018-07-03 | End: 2018-07-05 | Stop reason: HOSPADM

## 2018-07-03 RX ORDER — OXYCODONE HYDROCHLORIDE AND ACETAMINOPHEN 5; 325 MG/1; MG/1
1 TABLET ORAL EVERY 4 HOURS PRN
Status: DISCONTINUED | OUTPATIENT
Start: 2018-07-03 | End: 2018-07-03 | Stop reason: HOSPADM

## 2018-07-03 RX ORDER — CEFAZOLIN SODIUM 2 G/100ML
2 INJECTION, SOLUTION INTRAVENOUS ONCE
Status: COMPLETED | OUTPATIENT
Start: 2018-07-03 | End: 2018-07-03

## 2018-07-03 RX ORDER — CARBOPROST TROMETHAMINE 250 UG/ML
250 INJECTION, SOLUTION INTRAMUSCULAR ONCE
Status: DISCONTINUED | OUTPATIENT
Start: 2018-07-03 | End: 2018-07-05 | Stop reason: HOSPADM

## 2018-07-03 RX ORDER — SIMETHICONE 80 MG
80 TABLET,CHEWABLE ORAL 4 TIMES DAILY PRN
Status: DISCONTINUED | OUTPATIENT
Start: 2018-07-03 | End: 2018-07-05 | Stop reason: HOSPADM

## 2018-07-03 RX ORDER — SCOLOPAMINE TRANSDERMAL SYSTEM 1 MG/1
1 PATCH, EXTENDED RELEASE TRANSDERMAL
Status: DISCONTINUED | OUTPATIENT
Start: 2018-07-03 | End: 2018-07-03 | Stop reason: HOSPADM

## 2018-07-03 RX ORDER — MORPHINE SULFATE 2 MG/ML
2 INJECTION, SOLUTION INTRAMUSCULAR; INTRAVENOUS EVERY 4 HOURS PRN
Status: DISCONTINUED | OUTPATIENT
Start: 2018-07-03 | End: 2018-07-05 | Stop reason: HOSPADM

## 2018-07-03 RX ORDER — MIDAZOLAM HYDROCHLORIDE 1 MG/ML
INJECTION INTRAMUSCULAR; INTRAVENOUS AS NEEDED
Status: DISCONTINUED | OUTPATIENT
Start: 2018-07-03 | End: 2018-07-03 | Stop reason: SURG

## 2018-07-03 RX ADMIN — OXYCODONE HYDROCHLORIDE AND ACETAMINOPHEN 1 TABLET: 5; 325 TABLET ORAL at 21:34

## 2018-07-03 RX ADMIN — OXYCODONE HYDROCHLORIDE AND ACETAMINOPHEN 1 TABLET: 5; 325 TABLET ORAL at 17:39

## 2018-07-03 RX ADMIN — PHENYLEPHRINE HYDROCHLORIDE 100 MCG: 10 INJECTION INTRAVENOUS at 13:30

## 2018-07-03 RX ADMIN — SIMETHICONE CHEW TAB 80 MG 80 MG: 80 TABLET ORAL at 21:34

## 2018-07-03 RX ADMIN — CEFAZOLIN SODIUM 2 G: 2 INJECTION, SOLUTION INTRAVENOUS at 12:47

## 2018-07-03 RX ADMIN — IBUPROFEN 600 MG: 600 TABLET ORAL at 16:00

## 2018-07-03 RX ADMIN — DOCUSATE SODIUM 100 MG: 100 CAPSULE, LIQUID FILLED ORAL at 21:34

## 2018-07-03 RX ADMIN — PHENYLEPHRINE HYDROCHLORIDE 100 MCG: 10 INJECTION INTRAVENOUS at 13:24

## 2018-07-03 RX ADMIN — IBUPROFEN 600 MG: 600 TABLET ORAL at 21:34

## 2018-07-03 RX ADMIN — SODIUM CHLORIDE, POTASSIUM CHLORIDE, SODIUM LACTATE AND CALCIUM CHLORIDE: 600; 310; 30; 20 INJECTION, SOLUTION INTRAVENOUS at 13:15

## 2018-07-03 RX ADMIN — PROMETHAZINE HYDROCHLORIDE 12.5 MG: 25 INJECTION INTRAMUSCULAR; INTRAVENOUS at 14:23

## 2018-07-03 RX ADMIN — OXYTOCIN 20 UNITS: 10 INJECTION, SOLUTION INTRAMUSCULAR; INTRAVENOUS at 13:39

## 2018-07-03 RX ADMIN — METOCLOPRAMIDE 10 MG: 5 INJECTION, SOLUTION INTRAMUSCULAR; INTRAVENOUS at 12:24

## 2018-07-03 RX ADMIN — SODIUM CHLORIDE, POTASSIUM CHLORIDE, SODIUM LACTATE AND CALCIUM CHLORIDE 1000 ML/HR: 600; 310; 30; 20 INJECTION, SOLUTION INTRAVENOUS at 12:51

## 2018-07-03 RX ADMIN — FAMOTIDINE 20 MG: 10 INJECTION INTRAVENOUS at 12:24

## 2018-07-03 RX ADMIN — ONDANSETRON 4 MG: 2 INJECTION, SOLUTION INTRAMUSCULAR; INTRAVENOUS at 17:58

## 2018-07-03 RX ADMIN — FENTANYL CITRATE 15 MCG: 50 INJECTION, SOLUTION INTRAMUSCULAR; INTRAVENOUS at 13:05

## 2018-07-03 RX ADMIN — MIDAZOLAM HYDROCHLORIDE 1 MG: 1 INJECTION, SOLUTION INTRAMUSCULAR; INTRAVENOUS at 12:58

## 2018-07-03 RX ADMIN — SODIUM CITRATE AND CITRIC ACID MONOHYDRATE 30 ML: 500; 334 SOLUTION ORAL at 12:47

## 2018-07-03 RX ADMIN — SODIUM CHLORIDE, POTASSIUM CHLORIDE, SODIUM LACTATE AND CALCIUM CHLORIDE: 600; 310; 30; 20 INJECTION, SOLUTION INTRAVENOUS at 13:39

## 2018-07-03 RX ADMIN — MORPHINE SULFATE 150 MCG: 0.5 INJECTION, SOLUTION EPIDURAL; INTRATHECAL; INTRAVENOUS at 13:05

## 2018-07-03 RX ADMIN — BUPIVACAINE HYDROCHLORIDE 2.2 ML: 5 INJECTION, SOLUTION EPIDURAL; INTRACAUDAL; PERINEURAL at 13:05

## 2018-07-03 NOTE — PROGRESS NOTES
Cont to leak clear fluid, and luis every 3- 4 min. Proceed with 1 c/s, planned for history of traumatic prev . Desires BTL.

## 2018-07-03 NOTE — ANESTHESIA POSTPROCEDURE EVALUATION
Patient: Regla West    Procedure Summary     Date:  18 Room / Location:   [facility]    Anesthesia Start:  1257 Anesthesia Stop:  1343    Procedure:   SECTION PRIMARY WITH TUBAL * 39 WKS * (Bilateral Abdomen) Diagnosis:      Surgeon:  Cinda Greenberg MD Provider:  Lea Rutherford DO    Anesthesia Type:  spinal, ITN ASA Status:  2 - Emergent          Anesthesia Type: spinal, ITN  Last vitals  BP   (!) 84/49 (18 1345)   Temp   97.9 °F (36.6 °C) (18 1345)   Pulse   90 (18 1345)   Resp 17   SpO2 96     Post Anesthesia Care and Evaluation    Patient location during evaluation: bedside  Patient participation: complete - patient participated  Level of consciousness: awake and alert  Pain management: adequate  Airway patency: patent  Anesthetic complications: No anesthetic complications    Cardiovascular status: acceptable  Respiratory status: acceptable  Hydration status: acceptable

## 2018-07-03 NOTE — OP NOTE
Operative Note    Patient name: Regla West  YOB: 1985   MRN: [id number]  Admission Date: 7/3/2018  Referring Provider: Genoveva Sanders MD    ID: 32 y.o.  at 37w6d    Preoperative Diagnosis:   Patient Active Problem List   Diagnosis   • ANNIE (generalized anxiety disorder)   • Migraine without intractability   • Carpal tunnel syndrome   • Hypothyroid   •  contractions   • Pregnancy   desires 1 c/s  Desires sterilization    Postoperative Diagnosis: Same as above.    Procedure(s): primarylow transverse  delivery     Surgeons: Surgeon(s) and Role:     * Corey Polanco MD - Resident - Assisting     * Cinda Greenberg MD - Primary     * Luis Armando Epstein DO - Assisting    Anesthesia: Spinal    Estimated Blood Loss: 800 mL mL        Preoperative antibiotic: Ancef (cefazolin) 2 grams    Blood products:   Blood Administration Record     None          Pathology:   Order Name Source Comment Collection Info Order Time   TISSUE PATHOLOGY EXAM Fallopian Tubes, Bilateral  Collected By: Genoveva Sanders MD 7/3/2018  1:28 PM       Drains: Johnson catheter to gravity    Complications: None    Condition: Stable to recovery room                                          Infant:                 Gender: female  infant    Weight: 3383 g (7 lb 7.3 oz)     Apgars: 8   @ 1 minute /     9   @ 5 minutes    Cord gases: Venous:  @BABYNOHDR(BRIEFLAB, PHCVEN, BECVEN)@     Arterial:  @BABYNOHDR(BRIEFLAB, PHCART, BECART)@         Operative Summary:   After obtaining informed consent the patient was taken to the operating room where adequate anesthesia was obtained.  Johnson catheter was placed in the bladder preoperatively.  IV antibiotics were given preoperatively.       The abdomen was prepped and draped in the usual sterile fashion for  delivery.  After confirming adequate anesthesia a Pfannenstiel skin incision was made with the scalpel and carried through to the underlying layer of fascia.  The fascia  was incised in the midline and the incision extended laterally with the Key scissors and with blunt dissection.       The upper aspect of the fascia was grasped with 2 Kocher clamps, elevated, and dissected off the underlying rectus muscles bluntly and with the Key scissors.  The Kocher clamps were removed and applied to the inferior aspect of the fascia.  The fascia was dissected off of the rectus muscles in the same fashion.  The peritoneum was entered bluntly.  The incision was stretched and the bladder blade and Boone retractor inserted for visualization of the uterus. A bladder flap was made and bladder blade replaced.        The uterus was incised with the scalpel in a low transverse fashion.  The uterine incision was entered digitally and the incision extended bluntly in a cranial-caudal fashion.  Retractors were removed and membranes were ruptured.  The infant was delivered atraumatically from vtx presentation.  The umbilical cord was milked 4 times, clamped and cut and the nose and mouth bulb suctioned.  The infant was handed off to waiting pediatric staff.       Cord blood gases were not collected.  Cord blood was collected.  The placenta was removed using cord traction and uterine massage.  The uterus was exteriorized and cleared of all clots and debris.  The uterine incision was repaired with #1 chromic in a running locked fashion. A double layer technique was used.  Additional hemostatic measures required: none.    A [place] tubal ligation was performed without difficulty on bilateral fallopian tubes.     The incision was inspected and excellent hemostasis was noted.  The tubes and ovaries were noted to be normal.   The uterus was returned to the abdomen.  The gutters were cleared of all clots and debris.  Irrigation was used.  The uterine incision was again inspected and found to be hemostatic.       The peritoneum was reapproximated with 2-0 vicryl in a running fashion.  The fascia was closed  with 0 vicryl in a running fashion.  The subcutaneous space was reapproximated using 3-0 plain gut in interrupted stiches.      The skin was closed using staples, and dressing placed. All sharp, instrument, and sponge counts were correct. The patient was transferred to the recovery room in stable condition.    Cinda Greenberg MD  7/3/2018

## 2018-07-03 NOTE — H&P
History and Physical:    Subjective     Possible LOF, contractions    Regla West is a 32 y.o. year old  with an Estimated Date of Delivery: 18 currently at 37w6d presenting to office today c/o possible lof and contractions. Pt states she has had watery vag dc this am when she woke up, then soaked a pad after she took a shower. She also reports ctx ranging from 3-7 min apart and pelvic pressure. She reports fetal movement, but less than normal. Denies vaginal bleeding.     Prenatal care has been with Genoveva Sanders MD.  It has been significant for hypothyroidism, previous C/S.         Review of Systems  Pertinent items are noted in HPI.     Past Medical History:   Diagnosis Date   • Anemia     Taking iron   • Disease of thyroid gland     Takes synthroid   • Headache    • Pain in joint, lower leg      Past Surgical History:   Procedure Laterality Date   • SEPTOPLASTY      2017   • WISDOM TOOTH EXTRACTION       Family History   Problem Relation Age of Onset   • Hyperlipidemia Mother    • Hypertension Father    • Hyperlipidemia Father    • Thyroid disease Father    • Breast cancer Neg Hx    • Ovarian cancer Neg Hx      Social History   Substance Use Topics   • Smoking status: Never Smoker   • Smokeless tobacco: Never Used   • Alcohol use Yes      Comment: occasional prior to pregnancy      Prescriptions Prior to Admission   Medication Sig Dispense Refill Last Dose   • docusate sodium (COLACE) 250 MG capsule Take 250 mg by mouth Daily.   2018 at Unknown time   • levothyroxine (SYNTHROID, LEVOTHROID) 75 MCG tablet Take 1 tablet by mouth Daily. 90 tablet 1 2018 at Unknown time   • NIFEdipine (PROCARDIA) 10 MG capsule Take 1 capsule by mouth Every 4 (Four) Hours As Needed. 60 capsule 1 Past Month at Unknown time   • Prenatal Vit-Fe Phos-FA-Omega (VITAFOL GUMMIES) 3.33-0.333-34.8 MG chewable tablet Chew 1 tablet Daily. 90 tablet 1 2018 at Unknown time   • raNITIdine (ZANTAC) 150 MG tablet Take 150  mg by mouth 2 (Two) Times a Day.   7/3/2018 at Unknown time   • NIFEdipine (PROCARDIA) 10 MG capsule Take 1 capsule by mouth Every 4 (Four) Hours. 180 capsule 1      Allergies:  Macrobid [nitrofurantoin monohyd macro]  OB History    Para Term  AB Living   2 1 1     1   SAB TAB Ectopic Molar Multiple Live Births             1      # Outcome Date GA Lbr Kyle/2nd Weight Sex Delivery Anes PTL Lv   2 Current            1 Term 09/21/15 37w2d    Vag-Vacuum   LUCIA            Objective     /73   Pulse 84   Temp 99.1 °F (37.3 °C) (Oral)   Resp 18   Wt 75.8 kg (167 lb)   LMP 2017 Comment: denies preg  BMI 29.58 kg/m²     Physical Exam    General:  No acute distress           Abdomen: Gravid, nontender       FHT's: reactive    Cervix: 3/80/-2   [place]: Contraction are q4min     On exam, minimal pooling in spec, +nitrazine    Lab Review   External Prenatal Results     Pregnancy Outside Results - Transcribed From Office Records - See Scanned Records For Details     Test Value Date Time    Hgb 11.3 g/dL (L) 18 1151    Hct 36.0 % 18 1151    ABO       Rh       Antibody Screen       Glucose Fasting GTT       Glucose Tolerance Test 1 hour       Glucose Tolerance Test 3 hour       Gonorrhea (discrete)       Chlamydia (discrete)       RPR       VDRL       Syphilis Antibody       Rubella       HBsAg       Herpes Simplex Virus PCR       Herpes Simplex VIrus Culture       HIV       Hep C RNA Quant PCR       Hep C Antibody       AFP       Group B Strep No Group B Streptococcus Isolated from Broth Culture  18 1926    GBS Susceptibility to Clindamycin       GBS Susceptibility to Erythromycin       Fetal Fibronectin Negative  18 1418    Genetic Testing, Maternal Blood             Drug Screening     Test Value Date Time    Urine Drug Screen       Amphetamine Screen       Barbiturate Screen       Benzodiazepine Screen       Methadone Screen       Phencyclidine Screen       Opiates Screen        THC Screen       Cocaine Screen       Propoxyphene Screen       Buprenorphine Screen       Methamphetamine Screen       Oxycodone Screen       Tricyclic Antidepressants Screen                       Assessment/Plan     ASSESSMENT  1. IUP at 37w6d  2. Possible labor and ROM  3.   Scheduled for repeat C/S   4.   GBBS neg              5.   Hypothyroidism    PLAN per Dr Greenberg  1. Admit to labor and delivery           Carmen Kaur, APRN  7/3/2018@

## 2018-07-03 NOTE — ANESTHESIA PROCEDURE NOTES
Spinal Block    Patient location during procedure: OR  Indication:at surgeon's request  Performed By  Anesthesiologist: ALICIA ESPINOZA  CRNA: VALENTINA DUMONT  Preanesthetic Checklist  Completed: patient identified, surgical consent, pre-op evaluation, timeout performed, IV checked, risks and benefits discussed and monitors and equipment checked  Spinal Block Prep:  Patient Position:sitting  Sterile Tech:cap, gloves, mask and sterile barriers  Prep:Betadine  Patient Monitoring:blood pressure monitoring, continuous pulse oximetry and EKG  Spinal Block Procedure  Approach:midline  Guidance:palpation technique  Location:L3-L4  Needle Type:Pencan  Needle Gauge:25 G  Placement of Spinal needle event:cerebrospinal fluid aspirated  Paresthesia: no  Fluid Appearance:clear  Post Assessment  Patient Tolerance:patient tolerated the procedure well with no apparent complications  Complications no

## 2018-07-03 NOTE — PLAN OF CARE
Problem: Patient Care Overview  Goal: Plan of Care Review  Outcome: Ongoing (interventions implemented as appropriate)   07/03/18 1416   Coping/Psychosocial   Plan of Care Reviewed With patient;spouse   Plan of Care Review   Progress no change     Goal: Individualization and Mutuality  Outcome: Ongoing (interventions implemented as appropriate)   07/03/18 1416   Individualization   Patient Specific Preferences Healthy mom, healthy baby     Goal: Discharge Needs Assessment  Outcome: Ongoing (interventions implemented as appropriate)   07/03/18 1416   Discharge Needs Assessment   Readmission Within the Last 30 Days no previous admission in last 30 days   Concerns to be Addressed no discharge needs identified   Patient/Family Anticipates Transition to home   Transportation Concerns car, none   Transportation Anticipated family or friend will provide   Anticipated Changes Related to Illness none   Equipment Needed After Discharge none   Disability   Equipment Currently Used at Home none     Goal: Interprofessional Rounds/Family Conf  Outcome: Ongoing (interventions implemented as appropriate)   07/03/18 1416   Interdisciplinary Rounds/Family Conf   Participants nursing

## 2018-07-03 NOTE — ANESTHESIA PREPROCEDURE EVALUATION
Anesthesia Evaluation     Patient summary reviewed and Nursing notes reviewed                Airway   Mallampati: III  TM distance: >3 FB  Neck ROM: full  Dental      Pulmonary - negative pulmonary ROS   Cardiovascular - negative cardio ROS        Neuro/Psych  (+) headaches, numbness (carpal tunnel), psychiatric history Anxiety,     GI/Hepatic/Renal/Endo    (+)   hypothyroidism,     Musculoskeletal (-) negative ROS    Abdominal    Substance History - negative use     OB/GYN    (+) Pregnant,         Other - negative ROS                     Anesthesia Plan    ASA 2 - emergent     spinal and ITN     Anesthetic plan and risks discussed with patient.

## 2018-07-04 LAB
BASOPHILS # BLD AUTO: 0.01 10*3/MM3 (ref 0–0.2)
BASOPHILS NFR BLD AUTO: 0.1 % (ref 0–1)
DEPRECATED RDW RBC AUTO: 48.6 FL (ref 37–54)
EOSINOPHIL # BLD AUTO: 0.16 10*3/MM3 (ref 0–0.3)
EOSINOPHIL NFR BLD AUTO: 1.8 % (ref 0–3)
ERYTHROCYTE [DISTWIDTH] IN BLOOD BY AUTOMATED COUNT: 14.7 % (ref 11.3–14.5)
HCT VFR BLD AUTO: 31.9 % (ref 34.5–44)
HGB BLD-MCNC: 10 G/DL (ref 11.5–15.5)
IMM GRANULOCYTES # BLD: 0.03 10*3/MM3 (ref 0–0.03)
IMM GRANULOCYTES NFR BLD: 0.3 % (ref 0–0.6)
LYMPHOCYTES # BLD AUTO: 1.9 10*3/MM3 (ref 0.6–4.8)
LYMPHOCYTES NFR BLD AUTO: 21.5 % (ref 24–44)
MCH RBC QN AUTO: 28.3 PG (ref 27–31)
MCHC RBC AUTO-ENTMCNC: 31.3 G/DL (ref 32–36)
MCV RBC AUTO: 90.4 FL (ref 80–99)
MONOCYTES # BLD AUTO: 0.56 10*3/MM3 (ref 0–1)
MONOCYTES NFR BLD AUTO: 6.3 % (ref 0–12)
NEUTROPHILS # BLD AUTO: 6.17 10*3/MM3 (ref 1.5–8.3)
NEUTROPHILS NFR BLD AUTO: 70 % (ref 41–71)
PLATELET # BLD AUTO: 132 10*3/MM3 (ref 150–450)
PMV BLD AUTO: 10.8 FL (ref 6–12)
RBC # BLD AUTO: 3.53 10*6/MM3 (ref 3.89–5.14)
WBC NRBC COR # BLD: 8.83 10*3/MM3 (ref 3.5–10.8)

## 2018-07-04 PROCEDURE — 85025 COMPLETE CBC W/AUTO DIFF WBC: CPT | Performed by: OBSTETRICS & GYNECOLOGY

## 2018-07-04 RX ADMIN — LEVOTHYROXINE SODIUM 75 MCG: 25 TABLET ORAL at 06:30

## 2018-07-04 RX ADMIN — IBUPROFEN 600 MG: 600 TABLET ORAL at 14:47

## 2018-07-04 RX ADMIN — SIMETHICONE CHEW TAB 80 MG 80 MG: 80 TABLET ORAL at 08:17

## 2018-07-04 RX ADMIN — DOCUSATE SODIUM 100 MG: 100 CAPSULE, LIQUID FILLED ORAL at 08:17

## 2018-07-04 RX ADMIN — DOCUSATE SODIUM 100 MG: 100 CAPSULE, LIQUID FILLED ORAL at 20:33

## 2018-07-04 RX ADMIN — IBUPROFEN 600 MG: 600 TABLET ORAL at 08:17

## 2018-07-04 RX ADMIN — OXYCODONE HYDROCHLORIDE AND ACETAMINOPHEN 1 TABLET: 5; 325 TABLET ORAL at 12:09

## 2018-07-04 RX ADMIN — IBUPROFEN 600 MG: 600 TABLET ORAL at 02:34

## 2018-07-04 RX ADMIN — SIMETHICONE CHEW TAB 80 MG 80 MG: 80 TABLET ORAL at 12:09

## 2018-07-04 RX ADMIN — OXYCODONE HYDROCHLORIDE AND ACETAMINOPHEN 1 TABLET: 5; 325 TABLET ORAL at 22:11

## 2018-07-04 RX ADMIN — OXYCODONE HYDROCHLORIDE AND ACETAMINOPHEN 1 TABLET: 5; 325 TABLET ORAL at 16:39

## 2018-07-04 RX ADMIN — OXYCODONE HYDROCHLORIDE AND ACETAMINOPHEN 1 TABLET: 5; 325 TABLET ORAL at 02:33

## 2018-07-04 RX ADMIN — IBUPROFEN 600 MG: 600 TABLET ORAL at 22:11

## 2018-07-04 RX ADMIN — SIMETHICONE CHEW TAB 80 MG 80 MG: 80 TABLET ORAL at 16:39

## 2018-07-04 NOTE — PROGRESS NOTES
2018    Name:Regla West    MR#:[id number]     PROGRESS NOTE:  Post-Op 1 S/P    HD:1    Subjective   32 y.o. yo Female  s/p CS at 37w6d doing well. Pain well controlled. Tolerating regular diet and having flatus. Lochia normal.     Patient Active Problem List   Diagnosis   • ANNIE (generalized anxiety disorder)   • Migraine without intractability   • Carpal tunnel syndrome   • Hypothyroid   •  contractions   • Pregnancy        Objective    Vitals  Temp:  Temp:  [96.1 °F (35.6 °C)-99.1 °F (37.3 °C)] 97.8 °F (36.6 °C)  Temp src: Oral  BP:  BP: ()/(43-84) 97/69  Pulse:  Heart Rate:  [] 80  RR:   Resp:  [16-18] 16    General Awake, alert, no distress  Abdomen Soft, non-distended, fundus firm, below umbilicus, appropriately tender  Incision  Intact, no erythema or exudate  Extremities Calves NT bilaterally     I/O last 3 completed shifts:  In: 1900.2 [I.V.:1900.2]  Out: 1700 [Urine:900; Blood:800]    LABS:   Lab Results   Component Value Date    WBC 8.83 2018    HGB 10.0 (L) 2018    HCT 31.9 (L) 2018    MCV 90.4 2018     (L) 2018       Infant: female       Assessment   1.  POD 1    Plan: Doing well.  Routine postoperative care      Active Problems:   None      Neena Goldstein MD  2018 10:41 AM

## 2018-07-05 VITALS
OXYGEN SATURATION: 98 % | BODY MASS INDEX: 29.58 KG/M2 | TEMPERATURE: 98.2 F | SYSTOLIC BLOOD PRESSURE: 113 MMHG | DIASTOLIC BLOOD PRESSURE: 77 MMHG | WEIGHT: 167 LBS | HEART RATE: 77 BPM | RESPIRATION RATE: 18 BRPM

## 2018-07-05 PROBLEM — O47.00 PRETERM CONTRACTIONS: Status: RESOLVED | Noted: 2018-05-16 | Resolved: 2018-07-05

## 2018-07-05 PROBLEM — Z34.90 PREGNANCY: Status: RESOLVED | Noted: 2018-07-03 | Resolved: 2018-07-05

## 2018-07-05 RX ORDER — IBUPROFEN 600 MG/1
600 TABLET ORAL EVERY 6 HOURS PRN
Qty: 30 TABLET | Refills: 0 | Status: SHIPPED | OUTPATIENT
Start: 2018-07-05 | End: 2018-07-05

## 2018-07-05 RX ORDER — IBUPROFEN 600 MG/1
600 TABLET ORAL EVERY 6 HOURS PRN
Qty: 30 TABLET | Refills: 0 | Status: SHIPPED | OUTPATIENT
Start: 2018-07-05 | End: 2018-09-13

## 2018-07-05 RX ORDER — OXYCODONE HYDROCHLORIDE AND ACETAMINOPHEN 5; 325 MG/1; MG/1
1-2 TABLET ORAL EVERY 4 HOURS PRN
Qty: 20 TABLET | Refills: 0 | Status: SHIPPED | OUTPATIENT
Start: 2018-07-05 | End: 2018-07-08

## 2018-07-05 RX ADMIN — DOCUSATE SODIUM 100 MG: 100 CAPSULE, LIQUID FILLED ORAL at 09:30

## 2018-07-05 RX ADMIN — OXYCODONE HYDROCHLORIDE AND ACETAMINOPHEN 1 TABLET: 5; 325 TABLET ORAL at 04:05

## 2018-07-05 RX ADMIN — IBUPROFEN 600 MG: 600 TABLET ORAL at 10:44

## 2018-07-05 RX ADMIN — POLYETHYLENE GLYCOL (3350) 17 G: 17 POWDER, FOR SOLUTION ORAL at 09:30

## 2018-07-05 RX ADMIN — LEVOTHYROXINE SODIUM 75 MCG: 25 TABLET ORAL at 05:39

## 2018-07-05 RX ADMIN — IBUPROFEN 600 MG: 600 TABLET ORAL at 04:05

## 2018-07-05 RX ADMIN — OXYCODONE HYDROCHLORIDE AND ACETAMINOPHEN 1 TABLET: 5; 325 TABLET ORAL at 13:27

## 2018-07-05 NOTE — PLAN OF CARE
Problem: Patient Care Overview  Goal: Plan of Care Review  Outcome: Ongoing (interventions implemented as appropriate)   18 0007   Coping/Psychosocial   Plan of Care Reviewed With patient   Plan of Care Review   Progress improving     Goal: Discharge Needs Assessment  Outcome: Ongoing (interventions implemented as appropriate)   18 1416   Discharge Needs Assessment   Readmission Within the Last 30 Days no previous admission in last 30 days   Concerns to be Addressed no discharge needs identified     Goal: Interprofessional Rounds/Family Conf  Outcome: Ongoing (interventions implemented as appropriate)      Problem:  Delivery (Adult,Obstetrics,Pediatric)  Goal: Signs and Symptoms of Listed Potential Problems Will be Absent, Minimized or Managed ( Delivery)  Outcome: Ongoing (interventions implemented as appropriate)   18 0007   Goal/Outcome Evaluation   Problems Assessed ( Delivery) all   Problems Present ( Delivery) none

## 2018-07-05 NOTE — DISCHARGE SUMMARY
Discharge Summary    Date of Admission: 7/3/2018  Date of Discharge:  2018      Patient: Regla West      MR#:[id number]    Primary Surgeon/OB: Cinda Greenberg MD    Presenting Problem/History of Present Illness  Pregnancy [Z34.90]     Patient Active Problem List   Diagnosis   • ANNIE (generalized anxiety disorder)   • Migraine without intractability   • Carpal tunnel syndrome   • Hypothyroid         Discharge Diagnosis:  section at 37w6d    Procedures:  , Low Transverse     7/3/2018    1:20 PM          Discharge Date: 2018; Discharge Time: 8:10 AM        Hospital Course  Patient is a 32 y.o. female  at 37w6d status post  section with uneventful postoperative recovery.  Patient was advanced to regular diet on postoperative day#1.  On discharge, ambulating, tolerating a regular diet without any difficulties and her incision is dry, clean and intact. Pt ready for dc on day 2.  Will rto tomorrow for staple removal.    Infant:   female  fetus 3383 g (7 lb 7.3 oz)  with Apgar scores of 8  , 9   at five minutes.    Condition on Discharge:  Stable    Vital Signs  Temp:  [98.4 °F (36.9 °C)-98.8 °F (37.1 °C)] 98.8 °F (37.1 °C)  Heart Rate:  [75-85] 75  Resp:  [16-20] 16  BP: ()/(56-76) 95/64    Lab Results   Component Value Date    WBC 8.83 2018    HGB 10.0 (L) 2018    HCT 31.9 (L) 2018    MCV 90.4 2018     (L) 2018       Discharge Disposition  Home or Self Care    Discharge Medications     Discharge Medications      New Medications      Instructions Start Date   ibuprofen 600 MG tablet  Commonly known as:  ADVIL,MOTRIN   600 mg, Oral, Every 6 Hours PRN      oxyCODONE-acetaminophen 5-325 MG per tablet  Commonly known as:  PERCOCET   1-2 tablets, Oral, Every 4 Hours PRN         Continue These Medications      Instructions Start Date   docusate sodium 250 MG capsule  Commonly known as:  COLACE   250 mg, Oral, Daily      levothyroxine 75 MCG  tablet  Commonly known as:  SYNTHROID, LEVOTHROID   75 mcg, Oral, Daily      raNITIdine 150 MG tablet  Commonly known as:  ZANTAC   150 mg, Oral, 2 Times Daily      VITAFOL GUMMIES 3.33-0.333-34.8 MG chewable tablet   1 tablet, Oral, Daily         Stop These Medications    NIFEdipine 10 MG capsule  Commonly known as:  PROCARDIA            Discharge Diet:     Activity at Discharge:     Follow-up Appointments  No future appointments.  Additional Instructions for the Follow-ups that You Need to Schedule     Discharge Follow-up with Specified Provider: gregor; 2 Weeks    As directed      To:  gregor    Follow Up:  2 Weeks               Rere Dejesus, BRYCE  07/05/18  8:10 AM  Csd

## 2018-07-06 LAB
CYTO UR: NORMAL
LAB AP CASE REPORT: NORMAL
LAB AP CLINICAL INFORMATION: NORMAL
PATH REPORT.FINAL DX SPEC: NORMAL
PATH REPORT.GROSS SPEC: NORMAL

## 2018-07-10 ENCOUNTER — APPOINTMENT (OUTPATIENT)
Dept: PREADMISSION TESTING | Facility: HOSPITAL | Age: 33
End: 2018-07-10

## 2018-08-27 ENCOUNTER — LAB (OUTPATIENT)
Dept: LAB | Facility: HOSPITAL | Age: 33
End: 2018-08-27

## 2018-08-27 DIAGNOSIS — E03.9 HYPOTHYROIDISM, UNSPECIFIED TYPE: Primary | ICD-10-CM

## 2018-08-27 PROCEDURE — 84443 ASSAY THYROID STIM HORMONE: CPT

## 2018-08-27 PROCEDURE — 36415 COLL VENOUS BLD VENIPUNCTURE: CPT

## 2018-09-06 ENCOUNTER — PREP FOR SURGERY (OUTPATIENT)
Dept: OTHER | Facility: HOSPITAL | Age: 33
End: 2018-09-06

## 2018-09-13 ENCOUNTER — OFFICE VISIT (OUTPATIENT)
Dept: NEUROSURGERY | Facility: CLINIC | Age: 33
End: 2018-09-13

## 2018-09-13 VITALS
TEMPERATURE: 99.1 F | BODY MASS INDEX: 25.2 KG/M2 | SYSTOLIC BLOOD PRESSURE: 108 MMHG | HEIGHT: 63 IN | DIASTOLIC BLOOD PRESSURE: 64 MMHG | WEIGHT: 142.2 LBS

## 2018-09-13 DIAGNOSIS — G56.91 NEUROPATHY, ARM, RIGHT: Primary | ICD-10-CM

## 2018-09-13 PROCEDURE — 99203 OFFICE O/P NEW LOW 30 MIN: CPT | Performed by: NEUROLOGICAL SURGERY

## 2018-09-13 RX ORDER — CHOLECALCIFEROL (VITAMIN D3) 125 MCG
5 CAPSULE ORAL NIGHTLY
COMMUNITY

## 2018-09-13 NOTE — PROGRESS NOTES
NAME: DRAKE ROBB PAGE   DOS: 2018  : 1985  PCP: Santi Nj MD    Chief Complaint:  Right upper extremity pain  Chief Complaint   Patient presents with   • Right Arm Pain/numbness/weakness   • R-hand numbness       History of Present Illness:  32 y.o. 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  She is a recent mother with 2 children and a 10 week old baby.  She has a history of chronic ulnar neuropathy of the right upper extremity with elbow pain Tinel's and presents for evaluation she denies any other symptomatology she does have a history of hypothyroidism    PMHX  Allergies:  Allergies   Allergen Reactions   • Macrobid [Nitrofurantoin Monohyd Macro] Rash     Medications    Current Outpatient Prescriptions:   •  levothyroxine (SYNTHROID, LEVOTHROID) 75 MCG tablet, Take 1 tablet by mouth Daily., Disp: 90 tablet, Rfl: 1  •  melatonin 5 MG tablet tablet, Take 5 mg by mouth Every Night., Disp: , Rfl:   Past Medical History:  Past Medical History:   Diagnosis Date   • Anemia     Taking iron   • Disease of thyroid gland     Takes synthroid   • Headache    • Pain in joint, lower leg      Past Surgical History:  Past Surgical History:   Procedure Laterality Date   •  SECTION WITH TUBAL Bilateral 7/3/2018    Procedure:  SECTION PRIMARY WITH TUBAL * 39 WKS *;  Surgeon: Cinda Greenberg MD;  Location: [facility] LABOR DELIVERY;  Service: Obstetrics/Gynecology   • SEPTOPLASTY      2017   • WISDOM TOOTH EXTRACTION       Social Hx:  Social History   Substance Use Topics   • Smoking status: Never Smoker   • Smokeless tobacco: Never Used   • Alcohol use Yes      Comment: occasional prior to pregnancy      Family Hx:  Family History   Problem Relation Age of Onset   • Hyperlipidemia Mother    • Hypertension Father    • Hyperlipidemia Father    • Thyroid disease Father    • Breast cancer Neg Hx    • Ovarian cancer Neg Hx      Review of Systems:         Review of Systems   Constitutional: Negative for activity change, appetite change, chills, diaphoresis, fatigue, fever and unexpected weight change.   HENT: Negative for congestion, dental problem, drooling, ear discharge, ear pain, facial swelling, hearing loss, mouth sores, nosebleeds, postnasal drip, rhinorrhea, sinus pressure, sneezing, sore throat, tinnitus, trouble swallowing and voice change.    Eyes: Negative for photophobia, pain, discharge, redness, itching and visual disturbance.   Respiratory: Negative for apnea, cough, choking, chest tightness, shortness of breath, wheezing and stridor.    Cardiovascular: Negative for chest pain, palpitations and leg swelling.   Gastrointestinal: Negative for abdominal distention, abdominal pain, anal bleeding, blood in stool, constipation, diarrhea, nausea, rectal pain and vomiting.   Endocrine: Negative for cold intolerance, heat intolerance, polydipsia, polyphagia and polyuria.   Genitourinary: Negative for decreased urine volume, difficulty urinating, dysuria, enuresis, flank pain, frequency, genital sores, hematuria and urgency.   Musculoskeletal: Negative for arthralgias, back pain, gait problem, joint swelling, myalgias, neck pain and neck stiffness.   Skin: Negative for color change, pallor, rash and wound.   Allergic/Immunologic: Negative for environmental allergies, food allergies and immunocompromised state.   Neurological: Positive for weakness and numbness. Negative for dizziness, tremors, seizures, syncope, facial asymmetry, speech difficulty, light-headedness and headaches.   Hematological: Negative for adenopathy. Does not bruise/bleed easily.   Psychiatric/Behavioral: Negative for agitation, behavioral problems, confusion, decreased concentration, dysphoric mood, hallucinations, self-injury, sleep disturbance and suicidal ideas. The patient is not nervous/anxious and is not hyperactive.    All other systems reviewed and are negative.     I have  reviewed this note template and all pertinent parts of the review of systems social, family history, surgical history and medication list      Physical Examination:  Vitals:    09/13/18 0917   BP: 108/64   Temp: 99.1 °F (37.3 °C)      General Appearance:   Well developed, well nourished, well groomed, alert, and cooperative.  Neurological examination:  Neurologic Exam  Vital signs were reviewed and documented in the chart  Patient appeared in good neurologic function with normal comprehension fluent speech  Mood and affect are normal  Sense of smell deferred    Pupils symmetric equally reactive funduscopic exam not visualized   Visual fields intact to confrontation  Extraocular movements intact  Face motor function is symmetric  Facial sensations normal  Hearing intact to finger rub hearing intact to finger rub  Tongue is midline  Palate symmetric  Swallowing normal  Shoulder shrug normal    Muscle bulk and tone normal  5 out of 5 strength no motor drift  Gait normal intact  Negative Romberg  No clonus long tract signs or myelopathy    Reflexes symmetric  No edema noted and extremities skin appears normal  She has a positive Tinel's at the right wrist    She has numbness splitting the ring finger    She has no atrophy    Review of Imaging/DATA:  EMG was reviewed her nerve conduction study demonstrates a velocity of 59  Diagnoses/Plan:    Ms. West is a 32 y.o. female   Reviewed her studies her exam and spent 30 minutes with her counseling her about she needs a support over the elbow extension and padding during work and sleep given her propensity to Hyperflex her elbow I also think her hypothyroidism and recent pregnancy or reasons to defer any surgical intervention given the lack of atrophy.  I would consider exploration of the pain was intolerable but explained that risks and complications accordingly area and I personally would give this more time to play out and if the EMG nerve conduction study shows persistent  decline in function we can contemplate exploration.  I've also written her for physical therapy.

## 2018-10-25 ENCOUNTER — OFFICE VISIT (OUTPATIENT)
Dept: FAMILY MEDICINE CLINIC | Facility: CLINIC | Age: 33
End: 2018-10-25

## 2018-10-25 VITALS
BODY MASS INDEX: 24.34 KG/M2 | WEIGHT: 137.4 LBS | RESPIRATION RATE: 14 BRPM | DIASTOLIC BLOOD PRESSURE: 64 MMHG | HEART RATE: 85 BPM | HEIGHT: 63 IN | OXYGEN SATURATION: 99 % | SYSTOLIC BLOOD PRESSURE: 100 MMHG

## 2018-10-25 DIAGNOSIS — G43.109 MIGRAINE WITH AURA AND WITHOUT STATUS MIGRAINOSUS, NOT INTRACTABLE: Primary | ICD-10-CM

## 2018-10-25 DIAGNOSIS — E03.9 HYPOTHYROIDISM, UNSPECIFIED TYPE: ICD-10-CM

## 2018-10-25 PROCEDURE — 99214 OFFICE O/P EST MOD 30 MIN: CPT | Performed by: PHYSICIAN ASSISTANT

## 2018-10-25 RX ORDER — LORATADINE 10 MG/1
10 TABLET ORAL DAILY
COMMUNITY

## 2018-10-25 RX ORDER — RIZATRIPTAN BENZOATE 10 MG/1
10 TABLET ORAL ONCE AS NEEDED
Qty: 9 TABLET | Refills: 1 | Status: SHIPPED | OUTPATIENT
Start: 2018-10-25 | End: 2021-03-18

## 2018-10-25 NOTE — PROGRESS NOTES
Subjective   Regla West is a 32 y.o. female  Migraine (Prev on Imitrex but didn't like side effects) and Hypothyroidism      History of Present Illness  Patient is a pleasant 32-year-old white female who comes in for follow-up of migraine headache she's been taking Imitrex which she states helped the headache but she has side effects from Imitrex.  She is getting 1 severe unilateral headache with photophobia phonophobia per month like try something else.      Patient also comes in follow-up hyperthyroidism needs blood work no heat or cold intolerance patient states she's needing lab work and rechecking levels she states she has some fatigue  The following portions of the patient's history were reviewed and updated as appropriate: allergies, current medications, past social history and problem list    Review of Systems   Constitutional: Negative for fatigue and unexpected weight change.   HENT: Negative for congestion, dental problem, postnasal drip, sinus pressure and sore throat.    Eyes: Negative for photophobia, pain and visual disturbance.   Cardiovascular: Negative for chest pain and palpitations.   Gastrointestinal: Negative for constipation, diarrhea, nausea and vomiting.   Endocrine: Negative for cold intolerance and heat intolerance.   Neurological: Positive for headaches. Negative for dizziness, tremors, syncope, facial asymmetry, speech difficulty, weakness, light-headedness and numbness.   Psychiatric/Behavioral: Negative for agitation, confusion, dysphoric mood and sleep disturbance. The patient is not nervous/anxious.        Objective     Vitals:    10/25/18 0838   BP: 100/64   Pulse: 85   Resp: 14   SpO2: 99%       Physical Exam   Constitutional: She appears well-developed and well-nourished.   HENT:   Head: Normocephalic.   No Exopthalmos   Neck: No JVD present. No thyromegaly present.   Cardiovascular: Normal rate, regular rhythm, normal heart sounds, intact distal pulses and normal pulses.     No murmur heard.  Pulmonary/Chest: Effort normal and breath sounds normal. No respiratory distress.   Abdominal: Soft. Bowel sounds are normal. There is no hepatosplenomegaly. There is no tenderness.   Musculoskeletal: She exhibits no edema.   Lymphadenopathy:     She has no cervical adenopathy.   Skin: Skin is warm and dry.   Psychiatric: She has a normal mood and affect.   Nursing note and vitals reviewed.      Assessment/Plan     Diagnoses and all orders for this visit:    Migraine with aura and without status migrainosus, not intractable  -     rizatriptan (MAXALT) 10 MG tablet; Take 1 tablet by mouth 1 (One) Time As Needed for Migraine for up to 1 dose. May repeat in 2 hours if needed    Hypothyroidism, unspecified type  -     TSH; Future  -     T4; Future    Other orders  -     loratadine (CLARITIN) 10 MG tablet; Take 10 mg by mouth Daily.    #1 time spent with patient 9:00 to 9:30, discussed importance of migraine prevention since proper sleep reduce stress we will try Maxalt for a short acting triptan.  He was told to avoid stress get proper sleep follow-up if no better will check labs for thyroid

## 2018-10-26 ENCOUNTER — LAB (OUTPATIENT)
Dept: LAB | Facility: HOSPITAL | Age: 33
End: 2018-10-26

## 2018-10-26 DIAGNOSIS — E03.9 HYPOTHYROIDISM, UNSPECIFIED TYPE: ICD-10-CM

## 2018-10-26 LAB
T4 SERPL-MCNC: 8.6 MCG/DL (ref 4.7–11.4)
TSH SERPL DL<=0.05 MIU/L-ACNC: 0.26 MIU/ML (ref 0.35–5.35)

## 2018-10-26 PROCEDURE — 84443 ASSAY THYROID STIM HORMONE: CPT

## 2018-10-26 PROCEDURE — 36415 COLL VENOUS BLD VENIPUNCTURE: CPT

## 2018-10-26 PROCEDURE — 84436 ASSAY OF TOTAL THYROXINE: CPT

## 2018-11-12 DIAGNOSIS — E03.9 HYPOTHYROIDISM, UNSPECIFIED TYPE: Primary | ICD-10-CM

## 2018-11-15 ENCOUNTER — HOSPITAL ENCOUNTER (EMERGENCY)
Facility: HOSPITAL | Age: 33
Discharge: HOME | End: 2018-11-16
Attending: EMERGENCY MEDICINE | Admitting: EMERGENCY MEDICINE

## 2018-11-15 DIAGNOSIS — R42 DIZZY SPELLS: Primary | ICD-10-CM

## 2018-11-15 PROCEDURE — 99284 EMERGENCY DEPT VISIT MOD MDM: CPT

## 2018-11-16 ENCOUNTER — APPOINTMENT (OUTPATIENT)
Dept: MRI IMAGING | Facility: HOSPITAL | Age: 33
End: 2018-11-16

## 2018-11-16 VITALS
SYSTOLIC BLOOD PRESSURE: 100 MMHG | RESPIRATION RATE: 18 BRPM | HEIGHT: 63 IN | OXYGEN SATURATION: 100 % | TEMPERATURE: 99.4 F | HEART RATE: 96 BPM | DIASTOLIC BLOOD PRESSURE: 59 MMHG | WEIGHT: 133 LBS | BODY MASS INDEX: 23.57 KG/M2

## 2018-11-16 LAB
ALBUMIN SERPL-MCNC: 3.93 G/DL (ref 3.2–4.8)
ALBUMIN/GLOB SERPL: 1.7 G/DL (ref 1.5–2.5)
ALP SERPL-CCNC: 63 U/L (ref 25–100)
ALT SERPL W P-5'-P-CCNC: 22 U/L (ref 7–40)
ANION GAP SERPL CALCULATED.3IONS-SCNC: 7 MMOL/L (ref 3–11)
AST SERPL-CCNC: 21 U/L (ref 0–33)
B-HCG UR QL: NEGATIVE
BASOPHILS # BLD AUTO: 0.02 10*3/MM3 (ref 0–0.2)
BASOPHILS NFR BLD AUTO: 0.4 % (ref 0–1)
BILIRUB SERPL-MCNC: 0.4 MG/DL (ref 0.3–1.2)
BILIRUB UR QL STRIP: NEGATIVE
BUN BLD-MCNC: 14 MG/DL (ref 9–23)
BUN/CREAT SERPL: 19.4 (ref 7–25)
CALCIUM SPEC-SCNC: 9.6 MG/DL (ref 8.7–10.4)
CHLORIDE SERPL-SCNC: 111 MMOL/L (ref 99–109)
CLARITY UR: CLEAR
CO2 SERPL-SCNC: 21 MMOL/L (ref 20–31)
COLOR UR: YELLOW
CREAT BLD-MCNC: 0.72 MG/DL (ref 0.6–1.3)
DEPRECATED RDW RBC AUTO: 44.4 FL (ref 37–54)
EOSINOPHIL # BLD AUTO: 0.32 10*3/MM3 (ref 0–0.3)
EOSINOPHIL NFR BLD AUTO: 5.9 % (ref 0–3)
ERYTHROCYTE [DISTWIDTH] IN BLOOD BY AUTOMATED COUNT: 13.9 % (ref 11.3–14.5)
GFR SERPL CREATININE-BSD FRML MDRD: 93 ML/MIN/1.73
GLOBULIN UR ELPH-MCNC: 2.3 GM/DL
GLUCOSE BLD-MCNC: 95 MG/DL (ref 70–100)
GLUCOSE UR STRIP-MCNC: NEGATIVE MG/DL
HCT VFR BLD AUTO: 39.1 % (ref 34.5–44)
HGB BLD-MCNC: 12.8 G/DL (ref 11.5–15.5)
HGB UR QL STRIP.AUTO: NEGATIVE
IMM GRANULOCYTES # BLD: 0.01 10*3/MM3 (ref 0–0.03)
IMM GRANULOCYTES NFR BLD: 0.2 % (ref 0–0.6)
INTERNAL NEGATIVE CONTROL: NEGATIVE
INTERNAL POSITIVE CONTROL: POSITIVE
KETONES UR QL STRIP: ABNORMAL
LEUKOCYTE ESTERASE UR QL STRIP.AUTO: NEGATIVE
LYMPHOCYTES # BLD AUTO: 1.57 10*3/MM3 (ref 0.6–4.8)
LYMPHOCYTES NFR BLD AUTO: 29.2 % (ref 24–44)
Lab: NORMAL
MCH RBC QN AUTO: 28.8 PG (ref 27–31)
MCHC RBC AUTO-ENTMCNC: 32.7 G/DL (ref 32–36)
MCV RBC AUTO: 87.9 FL (ref 80–99)
MONOCYTES # BLD AUTO: 0.37 10*3/MM3 (ref 0–1)
MONOCYTES NFR BLD AUTO: 6.9 % (ref 0–12)
NEUTROPHILS # BLD AUTO: 3.1 10*3/MM3 (ref 1.5–8.3)
NEUTROPHILS NFR BLD AUTO: 57.6 % (ref 41–71)
NITRITE UR QL STRIP: NEGATIVE
PH UR STRIP.AUTO: 5.5 [PH] (ref 5–8)
PLATELET # BLD AUTO: 190 10*3/MM3 (ref 150–450)
PMV BLD AUTO: 9.9 FL (ref 6–12)
POTASSIUM BLD-SCNC: 3.2 MMOL/L (ref 3.5–5.5)
PROT SERPL-MCNC: 6.2 G/DL (ref 5.7–8.2)
PROT UR QL STRIP: NEGATIVE
RBC # BLD AUTO: 4.45 10*6/MM3 (ref 3.89–5.14)
SODIUM BLD-SCNC: 139 MMOL/L (ref 132–146)
SP GR UR STRIP: 1.02 (ref 1–1.03)
TROPONIN I SERPL-MCNC: 0 NG/ML (ref 0–0.07)
UROBILINOGEN UR QL STRIP: ABNORMAL
WBC NRBC COR # BLD: 5.38 10*3/MM3 (ref 3.5–10.8)

## 2018-11-16 PROCEDURE — 81025 URINE PREGNANCY TEST: CPT | Performed by: EMERGENCY MEDICINE

## 2018-11-16 PROCEDURE — 85025 COMPLETE CBC W/AUTO DIFF WBC: CPT | Performed by: EMERGENCY MEDICINE

## 2018-11-16 PROCEDURE — 84484 ASSAY OF TROPONIN QUANT: CPT

## 2018-11-16 PROCEDURE — 80053 COMPREHEN METABOLIC PANEL: CPT | Performed by: EMERGENCY MEDICINE

## 2018-11-16 PROCEDURE — 70551 MRI BRAIN STEM W/O DYE: CPT

## 2018-11-16 PROCEDURE — 93005 ELECTROCARDIOGRAM TRACING: CPT | Performed by: EMERGENCY MEDICINE

## 2018-11-16 PROCEDURE — 81003 URINALYSIS AUTO W/O SCOPE: CPT | Performed by: EMERGENCY MEDICINE

## 2018-11-16 RX ORDER — MECLIZINE HYDROCHLORIDE 25 MG/1
25 TABLET ORAL EVERY 6 HOURS PRN
Qty: 20 TABLET | Refills: 0 | Status: SHIPPED | OUTPATIENT
Start: 2018-11-16 | End: 2018-11-21

## 2018-11-16 RX ORDER — SODIUM CHLORIDE 0.9 % (FLUSH) 0.9 %
10 SYRINGE (ML) INJECTION AS NEEDED
Status: DISCONTINUED | OUTPATIENT
Start: 2018-11-16 | End: 2018-11-16 | Stop reason: HOSPADM

## 2018-11-16 RX ORDER — LORAZEPAM 1 MG/1
1 TABLET ORAL ONCE
Status: COMPLETED | OUTPATIENT
Start: 2018-11-16 | End: 2018-11-16

## 2018-11-16 RX ORDER — MECLIZINE HYDROCHLORIDE 25 MG/1
25 TABLET ORAL ONCE
Status: COMPLETED | OUTPATIENT
Start: 2018-11-16 | End: 2018-11-16

## 2018-11-16 RX ORDER — LORAZEPAM 2 MG/ML
0.5 INJECTION INTRAMUSCULAR ONCE
Status: DISCONTINUED | OUTPATIENT
Start: 2018-11-16 | End: 2018-11-16

## 2018-11-16 RX ADMIN — MECLIZINE HYDROCHLORIDE 25 MG: 25 TABLET ORAL at 00:40

## 2018-11-16 RX ADMIN — LORAZEPAM 1 MG: 1 TABLET ORAL at 00:41

## 2018-11-16 NOTE — ED PROVIDER NOTES
Subjective   Ms. Regla West is a 32 y.o. female who presents to the ED with c/o dizziness. Pt had  performed in 2018 and she states since beginning of that pregnancy she has been experiencing frequent episodes of headaches and dizziness. Since ,  the headaches and dizziness episodes have progressively worsened and have significantly exacerbated in the past week. The headache is constant with associated dull sensation and accompanied sinus pressure. The dizziness has duration of approximately 4 seconds and is accompanied with a head spinning sensation and imbalance. Within the past week, she has been experiencing nausea with these episodes and the dizziness has increased in frequency with approximately 10 to 15 episodes per hour. The onset of these episodes occurs when standing and when lying down. Pt has hx of migraines, however current headache is different in that it does not involve visual changes and aura.  Additionally, pt notes approximately one week ago she had gradual onset of nasal discharge, ear fullness, and congestion for which she was diagnosed with sinus infection and prescribed doxycycline; these sx are now resolved.  Pt also has hx of hypothyroidism, diagnosed with last pregnancy 3 years ago, however with past pregnancy her thyroid levels have been abnormal compared to baseline and are considered to be hyperthyroidism. The new onset of hyperthyroidism is currently being followed by her PCP.     She denies SoA, dysuria, diarrhea, constipation, abd pain, vomiting, chest pain, edema, vision changes, tinnitus, ear fullness, ear pain, and any other acute sx at this time.             History provided by:  Patient  Dizziness   Quality:  Head spinning and imbalance  Severity:  Moderate  Onset quality:  Sudden  Timing:  Intermittent  Progression:  Worsening  Chronicity:  New  Relieved by:  None tried  Worsened by:  Nothing  Ineffective treatments:  None tried  Associated symptoms:  headaches and nausea    Associated symptoms: no chest pain, no diarrhea, no shortness of breath, no tinnitus, no vision changes and no vomiting        Review of Systems   HENT: Negative for congestion, rhinorrhea and tinnitus.    Eyes: Negative for visual disturbance.   Respiratory: Negative for cough and shortness of breath.    Cardiovascular: Negative for chest pain and leg swelling.   Gastrointestinal: Positive for nausea. Negative for abdominal pain, constipation, diarrhea and vomiting.   Genitourinary: Negative for dysuria.   Neurological: Positive for dizziness (with imbalance and head spinning) and headaches.   All other systems reviewed and are negative.      Past Medical History:   Diagnosis Date   • Anemia     Taking iron   • Disease of thyroid gland     Takes synthroid   • Headache    • Pain in joint, lower leg        Allergies   Allergen Reactions   • Macrobid [Nitrofurantoin Monohyd Macro] Rash       Past Surgical History:   Procedure Laterality Date   • SEPTOPLASTY      2017   • WISDOM TOOTH EXTRACTION         Family History   Problem Relation Age of Onset   • Hyperlipidemia Mother    • Hypertension Father    • Hyperlipidemia Father    • Thyroid disease Father    • Breast cancer Neg Hx    • Ovarian cancer Neg Hx        Social History     Socioeconomic History   • Marital status:      Spouse name: Not on file   • Number of children: Not on file   • Years of education: Not on file   • Highest education level: Not on file   Tobacco Use   • Smoking status: Never Smoker   • Smokeless tobacco: Never Used   Substance and Sexual Activity   • Alcohol use: Yes     Alcohol/week: 0.6 oz     Types: 1 Glasses of wine per week   • Drug use: No   • Sexual activity: Yes     Partners: Male         Objective   Physical Exam   Constitutional: She is oriented to person, place, and time. She appears well-developed and well-nourished. No distress.   Pt is awake and alert with normal mentation.    HENT:   Head:  Normocephalic and atraumatic.   Nose: Left sinus exhibits maxillary sinus tenderness.   Eyes: Conjunctivae and EOM are normal. Pupils are equal, round, and reactive to light. No scleral icterus.   Neck: Normal range of motion. Neck supple.   Cardiovascular: Normal rate, regular rhythm and normal heart sounds. Exam reveals no gallop and no friction rub.   No murmur heard.  Pulmonary/Chest: Effort normal and breath sounds normal. She has no wheezes. She has no rales.   Lungs are clear diffusely.    Abdominal: Soft. Bowel sounds are normal. There is no tenderness.   Musculoskeletal: Normal range of motion.   Neurological: She is alert and oriented to person, place, and time. GCS eye subscore is 4. GCS verbal subscore is 5. GCS motor subscore is 6.   Skin: Skin is warm and dry. She is not diaphoretic.   Psychiatric: She has a normal mood and affect. Her behavior is normal. Judgment and thought content normal.   Nursing note and vitals reviewed.      Procedures         ED Course     Recent Results (from the past 24 hour(s))   Comprehensive Metabolic Panel    Collection Time: 11/16/18 12:24 AM   Result Value Ref Range    Glucose 95 70 - 100 mg/dL    BUN 14 9 - 23 mg/dL    Creatinine 0.72 0.60 - 1.30 mg/dL    Sodium 139 132 - 146 mmol/L    Potassium 3.2 (L) 3.5 - 5.5 mmol/L    Chloride 111 (H) 99 - 109 mmol/L    CO2 21.0 20.0 - 31.0 mmol/L    Calcium 9.6 8.7 - 10.4 mg/dL    Total Protein 6.2 5.7 - 8.2 g/dL    Albumin 3.93 3.20 - 4.80 g/dL    ALT (SGPT) 22 7 - 40 U/L    AST (SGOT) 21 0 - 33 U/L    Alkaline Phosphatase 63 25 - 100 U/L    Total Bilirubin 0.4 0.3 - 1.2 mg/dL    eGFR Non African Amer 93 >60 mL/min/1.73    Globulin 2.3 gm/dL    A/G Ratio 1.7 1.5 - 2.5 g/dL    BUN/Creatinine Ratio 19.4 7.0 - 25.0    Anion Gap 7.0 3.0 - 11.0 mmol/L   CBC Auto Differential    Collection Time: 11/16/18 12:24 AM   Result Value Ref Range    WBC 5.38 3.50 - 10.80 10*3/mm3    RBC 4.45 3.89 - 5.14 10*6/mm3    Hemoglobin 12.8 11.5 - 15.5  g/dL    Hematocrit 39.1 34.5 - 44.0 %    MCV 87.9 80.0 - 99.0 fL    MCH 28.8 27.0 - 31.0 pg    MCHC 32.7 32.0 - 36.0 g/dL    RDW 13.9 11.3 - 14.5 %    RDW-SD 44.4 37.0 - 54.0 fl    MPV 9.9 6.0 - 12.0 fL    Platelets 190 150 - 450 10*3/mm3    Neutrophil % 57.6 41.0 - 71.0 %    Lymphocyte % 29.2 24.0 - 44.0 %    Monocyte % 6.9 0.0 - 12.0 %    Eosinophil % 5.9 (H) 0.0 - 3.0 %    Basophil % 0.4 0.0 - 1.0 %    Immature Grans % 0.2 0.0 - 0.6 %    Neutrophils, Absolute 3.10 1.50 - 8.30 10*3/mm3    Lymphocytes, Absolute 1.57 0.60 - 4.80 10*3/mm3    Monocytes, Absolute 0.37 0.00 - 1.00 10*3/mm3    Eosinophils, Absolute 0.32 (H) 0.00 - 0.30 10*3/mm3    Basophils, Absolute 0.02 0.00 - 0.20 10*3/mm3    Immature Grans, Absolute 0.01 0.00 - 0.03 10*3/mm3   POC Troponin, Rapid    Collection Time: 11/16/18 12:27 AM   Result Value Ref Range    Troponin I 0.00 0.00 - 0.07 ng/mL   Urinalysis With Microscopic If Indicated (No Culture) - Urine, Clean Catch    Collection Time: 11/16/18 12:52 AM   Result Value Ref Range    Color, UA Yellow Yellow, Straw    Appearance, UA Clear Clear    pH, UA 5.5 5.0 - 8.0    Specific Gravity, UA 1.020 1.001 - 1.030    Glucose, UA Negative Negative    Ketones, UA 15 mg/dL (1+) (A) Negative    Bilirubin, UA Negative Negative    Blood, UA Negative Negative    Protein, UA Negative Negative    Leuk Esterase, UA Negative Negative    Nitrite, UA Negative Negative    Urobilinogen, UA 0.2 E.U./dL 0.2 - 1.0 E.U./dL   POCT Pregnancy, urine    Collection Time: 11/16/18 12:58 AM   Result Value Ref Range    HCG, Urine, QL Negative Negative    Lot Number [id number]     Internal Positive Control Positive     Internal Negative Control Negative      Note: In addition to lab results from this visit, the labs listed above may include labs taken at another facility or during a different encounter within the last 24 hours. Please correlate lab times with ED admission and discharge times for further clarification of the  services performed during this visit.    MRI Brain Without Contrast   Final Result   No acute infarct, acute intracranial hemorrhage, or mass effect.       THIS DOCUMENT HAS BEEN ELECTRONICALLY SIGNED BY AISHWARYA GOINS MD        Vitals:    11/16/18 0240 11/16/18 0300 11/16/18 0330 11/16/18 0342   BP:  100/64 100/59    Pulse:       Resp:       Temp:       SpO2: 98% 98% 99% 100%   Weight:       Height:         Medications   sodium chloride 0.9 % flush 10 mL (not administered)   meclizine (ANTIVERT) tablet 25 mg (25 mg Oral Given 11/16/18 0040)   LORazepam (ATIVAN) tablet 1 mg (1 mg Oral Given 11/16/18 0041)     ECG/EMG Results (last 24 hours)     Procedure Component Value Units Date/Time    ECG 12 Lead [[id number]] Collected:  11/16/18 0007     Updated:  11/16/18 0009                      MDM  Number of Diagnoses or Management Options  Dizzy spells: new and requires workup  Diagnosis management comments: Chin has normal labs, normal vital signs, and it normal MRI of the brain.  The sinuses also showed very minimal changes on the MRI the brain.    Patient reports some improvement in symptoms after receiving meclizine.    She will be discharged with meclizine to take with dizziness as needed.    Patient is advised that if symptoms continue to persist or become more severe she should either return to the ER or seek outpatient neurological evaluation.    She is discharged home appearing well.       Amount and/or Complexity of Data Reviewed  Clinical lab tests: ordered and reviewed  Tests in the radiology section of CPT®: ordered and reviewed  Decide to obtain previous medical records or to obtain history from someone other than the patient: yes  Obtain history from someone other than the patient: yes  Review and summarize past medical records: yes  Independent visualization of images, tracings, or specimens: yes        Final diagnoses:   Dizzy spells       Documentation assistance provided by timothy Vanegas.   Information recorded by the scribe was done at my direction and has been verified and validated by me.     Asad Vanegas  11/16/18 0018       Namrata Garza MD  11/16/18 0347

## 2018-11-16 NOTE — DISCHARGE INSTRUCTIONS
Take meclizine as needed to help with dizziness.     Follow-up with PcP for recheck within next week.     Return to ER with further concerns.

## 2018-12-10 ENCOUNTER — LAB (OUTPATIENT)
Dept: LAB | Facility: HOSPITAL | Age: 33
End: 2018-12-10

## 2018-12-10 DIAGNOSIS — E03.9 HYPOTHYROIDISM, UNSPECIFIED TYPE: Primary | ICD-10-CM

## 2018-12-10 DIAGNOSIS — E03.9 HYPOTHYROIDISM, UNSPECIFIED TYPE: ICD-10-CM

## 2018-12-10 LAB
ANION GAP SERPL CALCULATED.3IONS-SCNC: 7 MMOL/L (ref 3–11)
BUN BLD-MCNC: 12 MG/DL (ref 9–23)
BUN/CREAT SERPL: 9.6 (ref 7–25)
CALCIUM SPEC-SCNC: 9.3 MG/DL (ref 8.7–10.4)
CHLORIDE SERPL-SCNC: 105 MMOL/L (ref 99–109)
CO2 SERPL-SCNC: 27 MMOL/L (ref 20–31)
CREAT BLD-MCNC: 1.25 MG/DL (ref 0.6–1.3)
GFR SERPL CREATININE-BSD FRML MDRD: 49 ML/MIN/1.73
GLUCOSE BLD-MCNC: 88 MG/DL (ref 70–100)
POTASSIUM BLD-SCNC: 4.2 MMOL/L (ref 3.5–5.5)
SODIUM BLD-SCNC: 139 MMOL/L (ref 132–146)
T4 FREE SERPL-MCNC: 1.21 NG/DL (ref 0.89–1.76)
TSH SERPL DL<=0.05 MIU/L-ACNC: 0.97 MIU/ML (ref 0.35–5.35)

## 2018-12-10 PROCEDURE — 84443 ASSAY THYROID STIM HORMONE: CPT

## 2018-12-10 PROCEDURE — 36415 COLL VENOUS BLD VENIPUNCTURE: CPT

## 2018-12-10 PROCEDURE — 80048 BASIC METABOLIC PNL TOTAL CA: CPT

## 2018-12-10 PROCEDURE — 84439 ASSAY OF FREE THYROXINE: CPT

## 2018-12-11 RX ORDER — LEVOTHYROXINE SODIUM 0.07 MG/1
75 TABLET ORAL DAILY
Qty: 90 TABLET | Refills: 3 | Status: SHIPPED | OUTPATIENT
Start: 2018-12-11 | End: 2019-11-27 | Stop reason: DRUGHIGH

## 2019-01-29 RX ORDER — ONDANSETRON 4 MG/1
4 TABLET, FILM COATED ORAL EVERY 6 HOURS PRN
Qty: 30 TABLET | Refills: 0 | Status: SHIPPED | OUTPATIENT
Start: 2019-01-29 | End: 2019-05-30

## 2019-01-29 RX ORDER — CIPROFLOXACIN 500 MG/1
500 TABLET, FILM COATED ORAL 2 TIMES DAILY
Qty: 28 TABLET | Refills: 0 | Status: SHIPPED | OUTPATIENT
Start: 2019-01-29 | End: 2019-02-12

## 2019-03-08 ENCOUNTER — LAB (OUTPATIENT)
Dept: LAB | Facility: HOSPITAL | Age: 34
End: 2019-03-08

## 2019-03-08 DIAGNOSIS — J02.9 SORE THROAT: ICD-10-CM

## 2019-03-08 DIAGNOSIS — J02.9 SORE THROAT: Primary | ICD-10-CM

## 2019-03-08 DIAGNOSIS — R50.9 FEVER, UNSPECIFIED FEVER CAUSE: ICD-10-CM

## 2019-03-08 LAB — S PYO AG THROAT QL: POSITIVE

## 2019-03-08 PROCEDURE — 87880 STREP A ASSAY W/OPTIC: CPT

## 2019-03-15 RX ORDER — SPIRONOLACTONE 25 MG/1
25 TABLET ORAL 2 TIMES DAILY
Qty: 60 TABLET | Refills: 5 | Status: SHIPPED | OUTPATIENT
Start: 2019-03-15 | End: 2020-04-23

## 2019-05-17 ENCOUNTER — LAB (OUTPATIENT)
Dept: LAB | Facility: HOSPITAL | Age: 34
End: 2019-05-17

## 2019-05-17 DIAGNOSIS — E03.9 HYPOTHYROIDISM, UNSPECIFIED TYPE: ICD-10-CM

## 2019-05-17 DIAGNOSIS — E03.9 HYPOTHYROIDISM, UNSPECIFIED TYPE: Primary | ICD-10-CM

## 2019-05-17 LAB
ERYTHROCYTE [DISTWIDTH] IN BLOOD BY AUTOMATED COUNT: 14.2 % (ref 12.3–15.4)
HCT VFR BLD AUTO: 44 % (ref 34–46.6)
HGB BLD-MCNC: 14.8 G/DL (ref 12–15.9)
LYMPHOCYTES # BLD AUTO: 2 10*3/MM3 (ref 0.7–3.1)
LYMPHOCYTES NFR BLD AUTO: 32.8 % (ref 19.6–45.3)
MCH RBC QN AUTO: 31 PG (ref 26.6–33)
MCHC RBC AUTO-ENTMCNC: 33.6 G/DL (ref 31.5–35.7)
MCV RBC AUTO: 92.1 FL (ref 79–97)
MONOCYTES # BLD AUTO: 0.3 10*3/MM3 (ref 0.1–0.9)
MONOCYTES NFR BLD AUTO: 4.5 % (ref 5–12)
NEUTROPHILS # BLD AUTO: 3.8 10*3/MM3 (ref 1.7–7)
NEUTROPHILS NFR BLD AUTO: 62.7 % (ref 42.7–76)
PLATELET # BLD AUTO: 231 10*3/MM3 (ref 140–450)
PMV BLD AUTO: 7.9 FL (ref 6–12)
RBC # BLD AUTO: 4.78 10*6/MM3 (ref 3.77–5.28)
TSH SERPL DL<=0.05 MIU/L-ACNC: 1.99 MIU/ML (ref 0.27–4.2)
WBC NRBC COR # BLD: 6 10*3/MM3 (ref 3.4–10.8)

## 2019-05-17 PROCEDURE — 85025 COMPLETE CBC W/AUTO DIFF WBC: CPT

## 2019-05-17 PROCEDURE — 84443 ASSAY THYROID STIM HORMONE: CPT

## 2019-05-17 PROCEDURE — 36415 COLL VENOUS BLD VENIPUNCTURE: CPT

## 2019-05-30 ENCOUNTER — HOSPITAL ENCOUNTER (OUTPATIENT)
Dept: CT IMAGING | Facility: HOSPITAL | Age: 34
Discharge: HOME | End: 2019-05-30

## 2019-05-30 ENCOUNTER — OFFICE VISIT (OUTPATIENT)
Dept: FAMILY MEDICINE CLINIC | Facility: CLINIC | Age: 34
End: 2019-05-30

## 2019-05-30 ENCOUNTER — LAB (OUTPATIENT)
Dept: LAB | Facility: HOSPITAL | Age: 34
End: 2019-05-30

## 2019-05-30 ENCOUNTER — TRANSCRIBE ORDERS (OUTPATIENT)
Dept: FAMILY MEDICINE CLINIC | Facility: CLINIC | Age: 34
End: 2019-05-30

## 2019-05-30 ENCOUNTER — HOSPITAL ENCOUNTER (OUTPATIENT)
Dept: GENERAL RADIOLOGY | Facility: HOSPITAL | Age: 34
Discharge: HOME | End: 2019-05-30
Admitting: NURSE PRACTITIONER

## 2019-05-30 VITALS
TEMPERATURE: 99.3 F | OXYGEN SATURATION: 98 % | SYSTOLIC BLOOD PRESSURE: 88 MMHG | WEIGHT: 130 LBS | BODY MASS INDEX: 23.04 KG/M2 | HEIGHT: 63 IN | RESPIRATION RATE: 14 BRPM | HEART RATE: 87 BPM | DIASTOLIC BLOOD PRESSURE: 50 MMHG

## 2019-05-30 DIAGNOSIS — R05.9 COUGH: Primary | ICD-10-CM

## 2019-05-30 DIAGNOSIS — R06.02 SHORTNESS OF BREATH: ICD-10-CM

## 2019-05-30 DIAGNOSIS — R07.9 CHEST PAIN, UNSPECIFIED TYPE: Primary | ICD-10-CM

## 2019-05-30 DIAGNOSIS — R06.00 DYSPNEA, UNSPECIFIED TYPE: ICD-10-CM

## 2019-05-30 DIAGNOSIS — R05.9 COUGH: ICD-10-CM

## 2019-05-30 DIAGNOSIS — I26.99 PULMONARY EMBOLUS AND INFARCTION (HCC): Primary | ICD-10-CM

## 2019-05-30 DIAGNOSIS — R07.9 CHEST PAIN, UNSPECIFIED TYPE: ICD-10-CM

## 2019-05-30 LAB — D DIMER PPP FEU-MCNC: 0.78 MCGFEU/ML (ref 0–0.56)

## 2019-05-30 PROCEDURE — 71046 X-RAY EXAM CHEST 2 VIEWS: CPT

## 2019-05-30 PROCEDURE — 25010000002 IOPAMIDOL 61 % SOLUTION: Performed by: PHYSICIAN ASSISTANT

## 2019-05-30 PROCEDURE — 36415 COLL VENOUS BLD VENIPUNCTURE: CPT

## 2019-05-30 PROCEDURE — 85379 FIBRIN DEGRADATION QUANT: CPT

## 2019-05-30 PROCEDURE — 71275 CT ANGIOGRAPHY CHEST: CPT

## 2019-05-30 PROCEDURE — 99213 OFFICE O/P EST LOW 20 MIN: CPT | Performed by: PHYSICIAN ASSISTANT

## 2019-05-30 RX ORDER — CYCLOBENZAPRINE HCL 10 MG
10 TABLET ORAL 3 TIMES DAILY PRN
Qty: 30 TABLET | Refills: 1 | Status: SHIPPED | OUTPATIENT
Start: 2019-05-30 | End: 2020-07-15

## 2019-05-30 RX ADMIN — IOPAMIDOL 78 ML: 612 INJECTION, SOLUTION INTRAVENOUS at 16:39

## 2019-05-30 NOTE — PROGRESS NOTES
Subjective   Regla West is a 33 y.o. female  Abdominal Pain (LUQ/below Lt breast x4 days. Belching, pain is constant, no known injury. Treating with ibuprofen, heating pad.)      History of Present Illness  Patient is a pleasant 33-year-old white female who comes in complaining of severe pleuritic chest pain/chest wall pain x4 days patient states that she just recently had a long trip where she drove she has severe pain she had to take half of a Percocet last night with any improvement she has some shortness of breath cannot draw a deep breath she is currently not on hormones.  Patient states this is some of the worst pain she has had she cannot draw for breath.  She has no chest wall tenderness upon palpation   The following portions of the patient's history were reviewed and updated as appropriate: allergies, current medications, past social history and problem list    Review of Systems   Constitutional: Negative for fatigue and unexpected weight change.   Respiratory: Positive for cough and chest tightness. Negative for apnea and shortness of breath.    Cardiovascular: Positive for chest pain. Negative for palpitations and leg swelling.   Gastrointestinal: Negative for nausea.   Skin: Negative for color change and rash.   Neurological: Negative for dizziness, syncope, weakness and headaches.       Objective     Vitals:    05/30/19 1446   BP: (!) 88/50   Pulse: 87   Resp: 14   Temp: 99.3 °F (37.4 °C)   SpO2: 98%       Physical Exam   Constitutional: She appears well-developed and well-nourished.   Neck: No JVD present.   Cardiovascular: Normal rate, regular rhythm, normal heart sounds, intact distal pulses and normal pulses.   No murmur heard.  Pulmonary/Chest: Effort normal and breath sounds normal. No respiratory distress.   Abdominal: Soft. Bowel sounds are normal. There is no hepatosplenomegaly. There is no tenderness.   Musculoskeletal: She exhibits no edema.   Skin: Skin is warm and dry.   Nursing note  and vitals reviewed.      Assessment/Plan     Diagnoses and all orders for this visit:    Chest pain, unspecified type  -     D-dimer, Quantitative; Future    Dyspnea, unspecified type  -     D-dimer, Quantitative; Future    Other orders  -     Multiple Vitamins-Minerals (HAIR SKIN AND NAILS FORMULA PO); Take  by mouth.  -     cyclobenzaprine (FLEXERIL) 10 MG tablet; Take 1 tablet by mouth 3 (Three) Times a Day As Needed for Muscle Spasms.    CT of the chest ordered to rule out PE    Long discussion with patient and her  patient's pleuritic nature of her pain and the severity of it PE is concerned we will do a stat d-dimer along with a stat CT of the chest we will follow-up tomorrow if positive she was given a sample of a Xarelto starter pack for PE she start to take tonight I will keep in contact with her over the phone tonight and check her tomorrow at 1:00 in office

## 2019-05-31 ENCOUNTER — OFFICE VISIT (OUTPATIENT)
Dept: FAMILY MEDICINE CLINIC | Facility: CLINIC | Age: 34
End: 2019-05-31

## 2019-05-31 VITALS
SYSTOLIC BLOOD PRESSURE: 90 MMHG | HEIGHT: 63 IN | DIASTOLIC BLOOD PRESSURE: 64 MMHG | OXYGEN SATURATION: 98 % | HEART RATE: 88 BPM | BODY MASS INDEX: 23.03 KG/M2 | RESPIRATION RATE: 14 BRPM

## 2019-05-31 DIAGNOSIS — R07.9 CHEST PAIN, UNSPECIFIED TYPE: Primary | ICD-10-CM

## 2019-05-31 DIAGNOSIS — E78.5 HYPERLIPIDEMIA, UNSPECIFIED HYPERLIPIDEMIA TYPE: ICD-10-CM

## 2019-05-31 PROCEDURE — 99213 OFFICE O/P EST LOW 20 MIN: CPT | Performed by: PHYSICIAN ASSISTANT

## 2019-05-31 RX ORDER — METHYLPREDNISOLONE 4 MG/1
TABLET ORAL
Qty: 21 TABLET | Refills: 1 | Status: SHIPPED | OUTPATIENT
Start: 2019-05-31 | End: 2020-01-03

## 2019-05-31 NOTE — PROGRESS NOTES
Subjective   Regla West is a 33 y.o. female  Chest Pain      History of Present Illness  Patient is a 33-year-old white female who comes in for follow-up of chest pain patient was seen yesterday at the time thought she could possibly have a PE d-dimer and CT of the chest was ordered and Flexeril was called and ironically there was a slight elevation in her d-dimer but her CT scan was negative and she feels a little bit better today also of note there was nonspecific finding of some fatty liver changes she does have a family history of hyperlipidemia  The following portions of the patient's history were reviewed and updated as appropriate: allergies, current medications, past social history and problem list    Review of Systems   Constitutional: Negative for fatigue and unexpected weight change.   Respiratory: Negative for cough, chest tightness and shortness of breath.    Cardiovascular: Negative for chest pain, palpitations and leg swelling.   Gastrointestinal: Negative for nausea.   Skin: Negative for color change and rash.   Neurological: Negative for dizziness, syncope, weakness and headaches.       Objective     Vitals:    05/31/19 1305   BP: 90/64   Pulse: 88   Resp: 14   SpO2: 98%       Physical Exam   Constitutional: She appears well-developed and well-nourished.   Neck: Neck supple. No JVD present. No thyromegaly present.   Cardiovascular: Normal rate, regular rhythm, normal heart sounds, intact distal pulses and normal pulses.   No murmur heard.  Pulmonary/Chest: Effort normal and breath sounds normal. No respiratory distress.   Abdominal: Soft. Bowel sounds are normal. There is no hepatosplenomegaly. There is no tenderness.   Musculoskeletal: She exhibits no edema.   Lymphadenopathy:     She has no cervical adenopathy.   Neurological: No sensory deficit.   Skin: Skin is warm and dry. She is not diaphoretic.   Nursing note and vitals reviewed.      Assessment/Plan     Diagnoses and all orders for this  visit:    Chest pain, unspecified type  -     methylPREDNISolone (MEDROL, LUKAS,) 4 MG tablet; Take as directed on package instructions.    Hyperlipidemia, unspecified hyperlipidemia type  -     Cancel: Lipid Panel; Future  -     Lipid Panel; Future    Medrol Dosepak, continue Flexeril given Percocet 5/325 every 6-8 hours as needed pain dispense 16    2.  Check lipid panel follow low-fat diet recheck in 4 to 6 weeks

## 2019-06-04 ENCOUNTER — LAB (OUTPATIENT)
Dept: LAB | Facility: HOSPITAL | Age: 34
End: 2019-06-04

## 2019-06-04 DIAGNOSIS — E78.5 HYPERLIPIDEMIA, UNSPECIFIED HYPERLIPIDEMIA TYPE: ICD-10-CM

## 2019-06-04 LAB
CHOLEST SERPL-MCNC: 200 MG/DL (ref 0–200)
HDLC SERPL-MCNC: 58 MG/DL (ref 40–60)
LDLC SERPL CALC-MCNC: 119 MG/DL (ref 0–100)
LDLC/HDLC SERPL: 2.04 {RATIO}
TRIGL SERPL-MCNC: 117 MG/DL (ref 0–150)
VLDLC SERPL-MCNC: 23.4 MG/DL

## 2019-06-04 PROCEDURE — 80061 LIPID PANEL: CPT

## 2019-06-04 PROCEDURE — 36415 COLL VENOUS BLD VENIPUNCTURE: CPT

## 2019-06-28 RX ORDER — OFLOXACIN 3 MG/ML
1-2 SOLUTION/ DROPS OPHTHALMIC EVERY 4 HOURS PRN
Qty: 5 ML | Refills: 1 | Status: SHIPPED | OUTPATIENT
Start: 2019-06-28 | End: 2020-01-03

## 2019-09-30 ENCOUNTER — TRANSCRIBE ORDERS (OUTPATIENT)
Dept: ADMINISTRATIVE | Facility: HOSPITAL | Age: 34
End: 2019-09-30

## 2019-09-30 DIAGNOSIS — R92.8 ABNORMAL MAMMOGRAM: Primary | ICD-10-CM

## 2019-10-23 RX ORDER — AMOXICILLIN AND CLAVULANATE POTASSIUM 875; 125 MG/1; MG/1
1 TABLET, FILM COATED ORAL 2 TIMES DAILY
Qty: 20 TABLET | Refills: 0 | Status: SHIPPED | OUTPATIENT
Start: 2019-10-23 | End: 2019-11-13

## 2019-11-13 ENCOUNTER — OFFICE VISIT (OUTPATIENT)
Dept: FAMILY MEDICINE CLINIC | Facility: CLINIC | Age: 34
End: 2019-11-13

## 2019-11-13 VITALS
RESPIRATION RATE: 14 BRPM | HEART RATE: 82 BPM | DIASTOLIC BLOOD PRESSURE: 62 MMHG | WEIGHT: 129 LBS | SYSTOLIC BLOOD PRESSURE: 90 MMHG | OXYGEN SATURATION: 98 % | BODY MASS INDEX: 22.86 KG/M2 | HEIGHT: 63 IN

## 2019-11-13 DIAGNOSIS — F51.01 PRIMARY INSOMNIA: Primary | ICD-10-CM

## 2019-11-13 DIAGNOSIS — J01.00 ACUTE NON-RECURRENT MAXILLARY SINUSITIS: ICD-10-CM

## 2019-11-13 PROCEDURE — 99213 OFFICE O/P EST LOW 20 MIN: CPT | Performed by: PHYSICIAN ASSISTANT

## 2019-11-14 RX ORDER — PREDNISONE 20 MG/1
20 TABLET ORAL 2 TIMES DAILY
Qty: 14 TABLET | Refills: 0 | Status: SHIPPED | OUTPATIENT
Start: 2019-11-14 | End: 2020-01-03

## 2019-11-14 RX ORDER — CEFDINIR 300 MG/1
300 CAPSULE ORAL 2 TIMES DAILY
Qty: 20 CAPSULE | Refills: 0 | Status: SHIPPED | OUTPATIENT
Start: 2019-11-14 | End: 2020-01-03

## 2019-11-14 NOTE — PROGRESS NOTES
Subjective   Regla West is a 33 y.o. female  Insomnia (Diff staying asleep) and Sinusitis      History of Present Illness  Is a pleasant 33-year-old white female who comes in complaining of insomnia has trouble sleeping only sleeping 3 to 4 hours per night under a lot of stress patient states that she has trouble with concentration and focus the next morning because she is so sleepy and tired she tried multiple meds she states that she feels sedated in the morning she has taken Xanax in the past which helped her sleep and also helped her nerves she states she is under extreme amount of stress due to her daughter's illness.    Patient comes in plan of sinus pressure sinus congestion blowing green drainage from nose mild sore throat cough frontal headache pain with swallowing no shortness of breath no chest pain symptoms x3 days  The following portions of the patient's history were reviewed and updated as appropriate: allergies, current medications, past social history and problem list    Review of Systems   Constitutional: Negative for chills, fatigue and fever.   HENT: Positive for congestion, ear pain, postnasal drip, rhinorrhea and sinus pressure. Negative for sore throat.    Eyes: Positive for discharge and itching. Negative for pain.   Respiratory: Positive for cough. Negative for shortness of breath.    Gastrointestinal: Negative.    Genitourinary: Negative.    Musculoskeletal: Negative for myalgias.   Neurological: Positive for light-headedness and headaches. Negative for dizziness.   Hematological: Negative for adenopathy.   Psychiatric/Behavioral: Positive for sleep disturbance.       Objective     Vitals:    11/13/19 1008   BP: 90/62   Pulse: 82   Resp: 14   SpO2: 98%       Physical Exam   Constitutional: She appears well-developed and well-nourished.   HENT:   Head: Normocephalic and atraumatic.   Right Ear: Tympanic membrane and ear canal normal.   Left Ear: Tympanic membrane and ear canal normal.    Nose: Mucosal edema, rhinorrhea and sinus tenderness present. Right sinus exhibits maxillary sinus tenderness and frontal sinus tenderness. Left sinus exhibits maxillary sinus tenderness and frontal sinus tenderness.   Mouth/Throat: Oropharynx is clear and moist. No oropharyngeal exudate.   Eyes: Pupils are equal, round, and reactive to light.   Cardiovascular: Normal rate and regular rhythm.   Pulmonary/Chest: Effort normal and breath sounds normal.   Nursing note and vitals reviewed.      Assessment/Plan     Diagnoses and all orders for this visit:    Primary insomnia    Acute non-recurrent maxillary sinusitis    Other orders  -     cefdinir (OMNICEF) 300 MG capsule; Take 1 capsule by mouth 2 (Two) Times a Day.  -     predniSONE (DELTASONE) 20 MG tablet; Take 1 tablet by mouth 2 (Two) Times a Day.    #1 Xanax 0.5 mg 1 p.o. nightly dispense 30×3 refills    #2 Omnicef 300 mg 2 p.o. every day dispense 20    Prednisone 20 mg twice a day dispense 20

## 2019-11-27 ENCOUNTER — LAB (OUTPATIENT)
Dept: LAB | Facility: HOSPITAL | Age: 34
End: 2019-11-27

## 2019-11-27 DIAGNOSIS — E03.9 HYPOTHYROIDISM, UNSPECIFIED TYPE: Primary | ICD-10-CM

## 2019-11-27 DIAGNOSIS — E03.9 HYPOTHYROIDISM, UNSPECIFIED TYPE: ICD-10-CM

## 2019-11-27 DIAGNOSIS — J32.9 RECURRENT SINUS INFECTIONS: ICD-10-CM

## 2019-11-27 LAB
ERYTHROCYTE [DISTWIDTH] IN BLOOD BY AUTOMATED COUNT: 14.1 % (ref 12.3–15.4)
ERYTHROCYTE [SEDIMENTATION RATE] IN BLOOD: 16 MM/HR (ref 0–20)
HCT VFR BLD AUTO: 42.2 % (ref 34–46.6)
HGB BLD-MCNC: 14.3 G/DL (ref 12–15.9)
IGA1 MFR SER: 162 MG/DL (ref 70–400)
IGG1 SER-MCNC: 1066 MG/DL (ref 700–1600)
IGM SERPL-MCNC: 127 MG/DL (ref 40–230)
LYMPHOCYTES # BLD AUTO: 2.1 10*3/MM3 (ref 0.7–3.1)
LYMPHOCYTES NFR BLD AUTO: 32.1 % (ref 19.6–45.3)
MCH RBC QN AUTO: 31.5 PG (ref 26.6–33)
MCHC RBC AUTO-ENTMCNC: 33.9 G/DL (ref 31.5–35.7)
MCV RBC AUTO: 93.2 FL (ref 79–97)
MONOCYTES # BLD AUTO: 0.4 10*3/MM3 (ref 0.1–0.9)
MONOCYTES NFR BLD AUTO: 6.3 % (ref 5–12)
NEUTROPHILS # BLD AUTO: 4.1 10*3/MM3 (ref 1.7–7)
NEUTROPHILS NFR BLD AUTO: 61.6 % (ref 42.7–76)
PLATELET # BLD AUTO: 246 10*3/MM3 (ref 140–450)
PMV BLD AUTO: 7 FL (ref 6–12)
RBC # BLD AUTO: 4.53 10*6/MM3 (ref 3.77–5.28)
TSH SERPL DL<=0.05 MIU/L-ACNC: 4.5 UIU/ML (ref 0.27–4.2)
WBC NRBC COR # BLD: 6.6 10*3/MM3 (ref 3.4–10.8)

## 2019-11-27 PROCEDURE — 84443 ASSAY THYROID STIM HORMONE: CPT

## 2019-11-27 PROCEDURE — 82784 ASSAY IGA/IGD/IGG/IGM EACH: CPT

## 2019-11-27 PROCEDURE — 36415 COLL VENOUS BLD VENIPUNCTURE: CPT

## 2019-11-27 PROCEDURE — 85025 COMPLETE CBC W/AUTO DIFF WBC: CPT

## 2019-11-27 PROCEDURE — 85652 RBC SED RATE AUTOMATED: CPT

## 2019-11-27 RX ORDER — LEVOTHYROXINE SODIUM 88 UG/1
88 TABLET ORAL DAILY
Qty: 90 TABLET | Refills: 3 | Status: SHIPPED | OUTPATIENT
Start: 2019-11-27 | End: 2020-09-17

## 2019-12-31 ENCOUNTER — LAB (OUTPATIENT)
Dept: LAB | Facility: HOSPITAL | Age: 34
End: 2019-12-31

## 2019-12-31 DIAGNOSIS — G43.001 MIGRAINE WITHOUT AURA AND WITH STATUS MIGRAINOSUS, NOT INTRACTABLE: ICD-10-CM

## 2019-12-31 DIAGNOSIS — G43.001 MIGRAINE WITHOUT AURA AND WITH STATUS MIGRAINOSUS, NOT INTRACTABLE: Primary | ICD-10-CM

## 2019-12-31 LAB
FLUAV AG NPH QL: NEGATIVE
FLUBV AG NPH QL IA: NEGATIVE

## 2019-12-31 PROCEDURE — 87804 INFLUENZA ASSAY W/OPTIC: CPT

## 2020-01-03 ENCOUNTER — LAB (OUTPATIENT)
Dept: LAB | Facility: HOSPITAL | Age: 35
End: 2020-01-03

## 2020-01-03 ENCOUNTER — OFFICE VISIT (OUTPATIENT)
Dept: FAMILY MEDICINE CLINIC | Facility: CLINIC | Age: 35
End: 2020-01-03

## 2020-01-03 VITALS
HEART RATE: 100 BPM | BODY MASS INDEX: 22.86 KG/M2 | SYSTOLIC BLOOD PRESSURE: 100 MMHG | OXYGEN SATURATION: 99 % | WEIGHT: 129 LBS | DIASTOLIC BLOOD PRESSURE: 62 MMHG | HEIGHT: 63 IN | TEMPERATURE: 100.6 F

## 2020-01-03 DIAGNOSIS — E03.9 ACQUIRED HYPOTHYROIDISM: ICD-10-CM

## 2020-01-03 DIAGNOSIS — M25.50 ARTHRALGIA, UNSPECIFIED JOINT: ICD-10-CM

## 2020-01-03 DIAGNOSIS — R53.83 FATIGUE, UNSPECIFIED TYPE: ICD-10-CM

## 2020-01-03 DIAGNOSIS — N63.11 BREAST LUMP ON RIGHT SIDE AT 11 O'CLOCK POSITION: Primary | ICD-10-CM

## 2020-01-03 DIAGNOSIS — R50.9 FEVER, UNSPECIFIED FEVER CAUSE: ICD-10-CM

## 2020-01-03 DIAGNOSIS — R50.9 FEVER, UNSPECIFIED FEVER CAUSE: Primary | ICD-10-CM

## 2020-01-03 LAB
ALBUMIN SERPL-MCNC: 4.2 G/DL (ref 3.5–5.2)
ALBUMIN/GLOB SERPL: 1.3 G/DL
ALP SERPL-CCNC: 66 U/L (ref 39–117)
ALT SERPL W P-5'-P-CCNC: 19 U/L (ref 1–33)
ANION GAP SERPL CALCULATED.3IONS-SCNC: 12 MMOL/L (ref 5–15)
AST SERPL-CCNC: 24 U/L (ref 1–32)
BILIRUB SERPL-MCNC: 0.2 MG/DL (ref 0.2–1.2)
BUN BLD-MCNC: 10 MG/DL (ref 6–20)
BUN/CREAT SERPL: 12.2 (ref 7–25)
CALCIUM SPEC-SCNC: 9.3 MG/DL (ref 8.6–10.5)
CHLORIDE SERPL-SCNC: 103 MMOL/L (ref 98–107)
CHROMATIN AB SERPL-ACNC: 11.8 IU/ML (ref 0–14)
CO2 SERPL-SCNC: 24 MMOL/L (ref 22–29)
CREAT BLD-MCNC: 0.82 MG/DL (ref 0.57–1)
CRP SERPL-MCNC: 0.57 MG/DL (ref 0–0.5)
ERYTHROCYTE [DISTWIDTH] IN BLOOD BY AUTOMATED COUNT: 13.4 % (ref 12.3–15.4)
ERYTHROCYTE [SEDIMENTATION RATE] IN BLOOD: 11 MM/HR (ref 0–20)
EXPIRATION DATE: NORMAL
GFR SERPL CREATININE-BSD FRML MDRD: 79 ML/MIN/1.73
GLOBULIN UR ELPH-MCNC: 3.2 GM/DL
GLUCOSE BLD-MCNC: 121 MG/DL (ref 65–99)
HCT VFR BLD AUTO: 42.4 % (ref 34–46.6)
HGB BLD-MCNC: 14 G/DL (ref 12–15.9)
INTERNAL CONTROL: NORMAL
LYMPHOCYTES # BLD AUTO: 1.5 10*3/MM3 (ref 0.7–3.1)
LYMPHOCYTES NFR BLD AUTO: 33 % (ref 19.6–45.3)
Lab: NORMAL
MCH RBC QN AUTO: 30.6 PG (ref 26.6–33)
MCHC RBC AUTO-ENTMCNC: 32.9 G/DL (ref 31.5–35.7)
MCV RBC AUTO: 92.9 FL (ref 79–97)
MONOCYTES # BLD AUTO: 0.3 10*3/MM3 (ref 0.1–0.9)
MONOCYTES NFR BLD AUTO: 6.6 % (ref 5–12)
NEUTROPHILS # BLD AUTO: 2.8 10*3/MM3 (ref 1.7–7)
NEUTROPHILS NFR BLD AUTO: 60.4 % (ref 42.7–76)
PLATELET # BLD AUTO: 201 10*3/MM3 (ref 140–450)
PMV BLD AUTO: 7.1 FL (ref 6–12)
POTASSIUM BLD-SCNC: 3.7 MMOL/L (ref 3.5–5.2)
PROT SERPL-MCNC: 7.4 G/DL (ref 6–8.5)
RBC # BLD AUTO: 4.56 10*6/MM3 (ref 3.77–5.28)
S PYO AG THROAT QL: NEGATIVE
SODIUM BLD-SCNC: 139 MMOL/L (ref 136–145)
T4 FREE SERPL-MCNC: 1.27 NG/DL (ref 0.93–1.7)
TSH SERPL DL<=0.05 MIU/L-ACNC: 4.7 UIU/ML (ref 0.27–4.2)
WBC NRBC COR # BLD: 4.6 10*3/MM3 (ref 3.4–10.8)

## 2020-01-03 PROCEDURE — 80053 COMPREHEN METABOLIC PANEL: CPT

## 2020-01-03 PROCEDURE — 86038 ANTINUCLEAR ANTIBODIES: CPT

## 2020-01-03 PROCEDURE — 86665 EPSTEIN-BARR CAPSID VCA: CPT

## 2020-01-03 PROCEDURE — 86431 RHEUMATOID FACTOR QUANT: CPT

## 2020-01-03 PROCEDURE — 85652 RBC SED RATE AUTOMATED: CPT

## 2020-01-03 PROCEDURE — 86664 EPSTEIN-BARR NUCLEAR ANTIGEN: CPT

## 2020-01-03 PROCEDURE — 36415 COLL VENOUS BLD VENIPUNCTURE: CPT

## 2020-01-03 PROCEDURE — 85025 COMPLETE CBC W/AUTO DIFF WBC: CPT

## 2020-01-03 PROCEDURE — 99214 OFFICE O/P EST MOD 30 MIN: CPT | Performed by: FAMILY MEDICINE

## 2020-01-03 PROCEDURE — 84439 ASSAY OF FREE THYROXINE: CPT

## 2020-01-03 PROCEDURE — 87880 STREP A ASSAY W/OPTIC: CPT | Performed by: FAMILY MEDICINE

## 2020-01-03 PROCEDURE — 86140 C-REACTIVE PROTEIN: CPT

## 2020-01-03 PROCEDURE — 84443 ASSAY THYROID STIM HORMONE: CPT

## 2020-01-04 LAB
ANA HOMOGEN TITR SER: ABNORMAL {TITER}
ANA SER QL IA: POSITIVE
EBV NA IGG SER IA-ACNC: 457 U/ML (ref 0–17.9)
EBV VCA IGG SER-ACNC: 193 U/ML (ref 0–17.9)
EBV VCA IGM SER-ACNC: <36 U/ML (ref 0–35.9)
INTERPRETATION: ABNORMAL
Lab: ABNORMAL

## 2020-01-06 NOTE — PROGRESS NOTES
Subjective   Regla West is a 34 y.o. female    Chief Complaint    Arthralgias and myalgias  Fatigue  Fever    History of Present Illness  Patient with 2 to 3-week history of viral symptoms including arthralgias, myalgias, fatigue and fever.  1 of her children with confirmed case of RSV.  Patient's symptoms have continued to wax and wane but most prominent have been her arthralgias, myalgias, fatigue and fever.  Patient concerned about something more ominous then viral syndrome or post viral syndrome sequelae.  Current temperature is noted to be 100.6.  1 of her children diagnosed yesterday with strep.  Office strep screen is negative today.  Recent flu swab negative for both influenza A and influenza B.    The following portions of the patient's history were reviewed and updated as appropriate: allergies, current medications, past social history and problem list    Review of Systems   Constitutional: Positive for fatigue and fever. Negative for chills.   HENT: Positive for congestion and voice change. Negative for sinus pressure, sinus pain and trouble swallowing.    Eyes: Negative.    Respiratory: Positive for cough. Negative for chest tightness, shortness of breath and wheezing.    Cardiovascular: Negative for chest pain and palpitations.   Musculoskeletal: Positive for arthralgias and myalgias.   Neurological: Positive for weakness and light-headedness. Negative for dizziness, tremors and numbness.   Hematological: Positive for adenopathy. Does not bruise/bleed easily.   Psychiatric/Behavioral: Negative for dysphoric mood. The patient is not nervous/anxious.        Objective     Vitals:    01/03/20 1120   BP: 100/62   Pulse: 100   Temp: (!) 100.6 °F (38.1 °C)   SpO2: 99%       Physical Exam   Constitutional: She is oriented to person, place, and time. She appears well-developed and well-nourished. She appears ill.   HENT:   Head: Normocephalic and atraumatic.   Right Ear: Tympanic membrane and ear canal  normal.   Left Ear: Tympanic membrane and ear canal normal.   Mouth/Throat: Oropharynx is clear and moist and mucous membranes are normal.   Eyes: Pupils are equal, round, and reactive to light. Conjunctivae and EOM are normal.   Neck: Normal range of motion. Neck supple.   Cardiovascular: Normal rate and regular rhythm.   Pulmonary/Chest: Effort normal and breath sounds normal.   Abdominal: Soft. There is no tenderness.   Lymphadenopathy:     She has cervical adenopathy.   Neurological: She is alert and oriented to person, place, and time. No sensory deficit. She exhibits normal muscle tone.   Skin: Skin is warm and dry. No rash noted.   Psychiatric: She has a normal mood and affect. Her behavior is normal.   Nursing note and vitals reviewed.      Assessment/Plan   Problem List Items Addressed This Visit        Endocrine    Hypothyroid    Relevant Orders    TSH (Completed)    T4, Free (Completed)      Other Visit Diagnoses     Fever, unspecified fever cause    -  Primary    Relevant Orders    POC Rapid Strep A (Completed)    CBC & Differential (Completed)    Comprehensive Metabolic Panel (Completed)    Rheumatoid Factor (Completed)    Sedimentation Rate (Completed)    C-reactive Protein (Completed)    Nuclear Antigen Antibody, IFA (Completed)    Arthralgia, unspecified joint        Relevant Orders    Rheumatoid Factor (Completed)    Sedimentation Rate (Completed)    C-reactive Protein (Completed)    Nuclear Antigen Antibody, IFA (Completed)    Fatigue, unspecified type        Relevant Orders    CBC & Differential (Completed)    Comprehensive Metabolic Panel (Completed)    TSH (Completed)    T4, Free (Completed)    Rheumatoid Factor (Completed)    Sedimentation Rate (Completed)    C-reactive Protein (Completed)    Nuclear Antigen Antibody, IFA (Completed)

## 2020-01-07 ENCOUNTER — LAB (OUTPATIENT)
Dept: LAB | Facility: HOSPITAL | Age: 35
End: 2020-01-07

## 2020-01-07 DIAGNOSIS — M25.50 ARTHRALGIA, UNSPECIFIED JOINT: Primary | ICD-10-CM

## 2020-01-07 DIAGNOSIS — M25.50 ARTHRALGIA, UNSPECIFIED JOINT: ICD-10-CM

## 2020-01-07 DIAGNOSIS — R50.9 FEVER, UNSPECIFIED FEVER CAUSE: ICD-10-CM

## 2020-01-07 DIAGNOSIS — R76.8 POSITIVE ANA (ANTINUCLEAR ANTIBODY): ICD-10-CM

## 2020-01-07 PROCEDURE — 86256 FLUORESCENT ANTIBODY TITER: CPT

## 2020-01-07 PROCEDURE — 86225 DNA ANTIBODY NATIVE: CPT

## 2020-01-07 PROCEDURE — 83520 IMMUNOASSAY QUANT NOS NONAB: CPT

## 2020-01-07 PROCEDURE — 36415 COLL VENOUS BLD VENIPUNCTURE: CPT

## 2020-01-08 LAB — DSDNA AB SER-ACNC: 1 IU/ML (ref 0–9)

## 2020-01-09 ENCOUNTER — HOSPITAL ENCOUNTER (OUTPATIENT)
Dept: ULTRASOUND IMAGING | Facility: HOSPITAL | Age: 35
Discharge: HOME | End: 2020-01-09

## 2020-01-09 ENCOUNTER — HOSPITAL ENCOUNTER (OUTPATIENT)
Dept: MAMMOGRAPHY | Facility: HOSPITAL | Age: 35
Discharge: HOME | End: 2020-01-09
Admitting: OBSTETRICS & GYNECOLOGY

## 2020-01-09 DIAGNOSIS — R92.8 ABNORMAL MAMMOGRAM: ICD-10-CM

## 2020-01-09 LAB
C-ANCA TITR SER IF: NORMAL TITER
MYELOPEROXIDASE AB SER-ACNC: <9 U/ML (ref 0–9)
P-ANCA ATYPICAL TITR SER IF: NORMAL TITER
P-ANCA TITR SER IF: NORMAL TITER
PROTEINASE3 AB SER IA-ACNC: <3.5 U/ML (ref 0–3.5)

## 2020-01-09 PROCEDURE — 76642 ULTRASOUND BREAST LIMITED: CPT

## 2020-01-09 PROCEDURE — 77066 DX MAMMO INCL CAD BI: CPT | Performed by: RADIOLOGY

## 2020-01-09 PROCEDURE — 76642 ULTRASOUND BREAST LIMITED: CPT | Performed by: RADIOLOGY

## 2020-01-09 PROCEDURE — 77062 BREAST TOMOSYNTHESIS BI: CPT | Performed by: RADIOLOGY

## 2020-01-09 PROCEDURE — G0279 TOMOSYNTHESIS, MAMMO: HCPCS

## 2020-01-09 PROCEDURE — 77066 DX MAMMO INCL CAD BI: CPT

## 2020-02-04 ENCOUNTER — LAB (OUTPATIENT)
Dept: LAB | Facility: HOSPITAL | Age: 35
End: 2020-02-04

## 2020-02-04 DIAGNOSIS — M25.50 PAIN IN JOINTS: Primary | ICD-10-CM

## 2020-02-04 DIAGNOSIS — R76.8 OTHER SPECIFIED ABNORMAL IMMUNOLOGICAL FINDINGS IN SERUM: ICD-10-CM

## 2020-02-04 DIAGNOSIS — R53.83 OTHER FATIGUE: ICD-10-CM

## 2020-02-04 LAB
ALBUMIN SERPL-MCNC: 4.5 G/DL (ref 3.5–5.2)
ALBUMIN/GLOB SERPL: 1.2 G/DL
ALP SERPL-CCNC: 66 U/L (ref 39–117)
ALT SERPL W P-5'-P-CCNC: 26 U/L (ref 1–33)
ANION GAP SERPL CALCULATED.3IONS-SCNC: 14 MMOL/L (ref 5–15)
AST SERPL-CCNC: 28 U/L (ref 1–32)
BACTERIA UR QL AUTO: ABNORMAL /HPF
BILIRUB SERPL-MCNC: 0.2 MG/DL (ref 0.2–1.2)
BILIRUB UR QL STRIP: NEGATIVE
BUN BLD-MCNC: 10 MG/DL (ref 6–20)
BUN/CREAT SERPL: 12.2 (ref 7–25)
C3 SERPL-MCNC: 142 MG/DL (ref 82–167)
C4 SERPL-MCNC: 28 MG/DL (ref 14–44)
CALCIUM SPEC-SCNC: 9.3 MG/DL (ref 8.6–10.5)
CHLORIDE SERPL-SCNC: 103 MMOL/L (ref 98–107)
CLARITY UR: CLEAR
CO2 SERPL-SCNC: 20 MMOL/L (ref 22–29)
COLOR UR: YELLOW
CREAT BLD-MCNC: 0.82 MG/DL (ref 0.57–1)
CREAT UR-MCNC: 69.3 MG/DL
CRP SERPL-MCNC: 0.15 MG/DL (ref 0–0.5)
ERYTHROCYTE [DISTWIDTH] IN BLOOD BY AUTOMATED COUNT: 13.5 % (ref 12.3–15.4)
ERYTHROCYTE [SEDIMENTATION RATE] IN BLOOD: 9 MM/HR (ref 0–20)
GFR SERPL CREATININE-BSD FRML MDRD: 79 ML/MIN/1.73
GLOBULIN UR ELPH-MCNC: 3.7 GM/DL
GLUCOSE BLD-MCNC: 88 MG/DL (ref 65–99)
GLUCOSE UR STRIP-MCNC: NEGATIVE MG/DL
HCT VFR BLD AUTO: 44.3 % (ref 34–46.6)
HGB BLD-MCNC: 14.5 G/DL (ref 12–15.9)
HGB UR QL STRIP.AUTO: NEGATIVE
HYALINE CASTS UR QL AUTO: ABNORMAL /LPF
KETONES UR QL STRIP: NEGATIVE
LEUKOCYTE ESTERASE UR QL STRIP.AUTO: NEGATIVE
LYMPHOCYTES # BLD AUTO: 4 10*3/MM3 (ref 0.7–3.1)
LYMPHOCYTES NFR BLD AUTO: 52.7 % (ref 19.6–45.3)
MCH RBC QN AUTO: 29.4 PG (ref 26.6–33)
MCHC RBC AUTO-ENTMCNC: 32.6 G/DL (ref 31.5–35.7)
MCV RBC AUTO: 90.3 FL (ref 79–97)
MONOCYTES # BLD AUTO: 0.4 10*3/MM3 (ref 0.1–0.9)
MONOCYTES NFR BLD AUTO: 5.2 % (ref 5–12)
NEUTROPHILS # BLD AUTO: 3.2 10*3/MM3 (ref 1.7–7)
NEUTROPHILS NFR BLD AUTO: 42.1 % (ref 42.7–76)
NITRITE UR QL STRIP: NEGATIVE
PH UR STRIP.AUTO: 6.5 [PH] (ref 5–8)
PLATELET # BLD AUTO: 209 10*3/MM3 (ref 140–450)
PMV BLD AUTO: 7.3 FL (ref 6–12)
POTASSIUM BLD-SCNC: 3.4 MMOL/L (ref 3.5–5.2)
PROT SERPL-MCNC: 8.2 G/DL (ref 6–8.5)
PROT UR QL STRIP: NEGATIVE
PROT UR-MCNC: 5.5 MG/DL
RBC # BLD AUTO: 4.91 10*6/MM3 (ref 3.77–5.28)
RBC # UR: ABNORMAL /HPF
REF LAB TEST METHOD: ABNORMAL
SODIUM BLD-SCNC: 137 MMOL/L (ref 136–145)
SP GR UR STRIP: 1.02 (ref 1–1.03)
SQUAMOUS #/AREA URNS HPF: ABNORMAL /HPF
UROBILINOGEN UR QL STRIP: NORMAL
WBC NRBC COR # BLD: 7.5 10*3/MM3 (ref 3.4–10.8)
WBC UR QL AUTO: ABNORMAL /HPF

## 2020-02-04 PROCEDURE — 36415 COLL VENOUS BLD VENIPUNCTURE: CPT

## 2020-02-04 PROCEDURE — 86235 NUCLEAR ANTIGEN ANTIBODY: CPT

## 2020-02-04 PROCEDURE — 82784 ASSAY IGA/IGD/IGG/IGM EACH: CPT

## 2020-02-04 PROCEDURE — 86225 DNA ANTIBODY NATIVE: CPT

## 2020-02-04 PROCEDURE — 83520 IMMUNOASSAY QUANT NOS NONAB: CPT

## 2020-02-04 PROCEDURE — 85025 COMPLETE CBC W/AUTO DIFF WBC: CPT

## 2020-02-04 PROCEDURE — 84156 ASSAY OF PROTEIN URINE: CPT

## 2020-02-04 PROCEDURE — 80053 COMPREHEN METABOLIC PANEL: CPT

## 2020-02-04 PROCEDURE — 86140 C-REACTIVE PROTEIN: CPT

## 2020-02-04 PROCEDURE — 85652 RBC SED RATE AUTOMATED: CPT

## 2020-02-04 PROCEDURE — 86431 RHEUMATOID FACTOR QUANT: CPT

## 2020-02-04 PROCEDURE — 86255 FLUORESCENT ANTIBODY SCREEN: CPT

## 2020-02-04 PROCEDURE — 86146 BETA-2 GLYCOPROTEIN ANTIBODY: CPT

## 2020-02-04 PROCEDURE — 86160 COMPLEMENT ANTIGEN: CPT

## 2020-02-04 PROCEDURE — 86800 THYROGLOBULIN ANTIBODY: CPT

## 2020-02-04 PROCEDURE — 86147 CARDIOLIPIN ANTIBODY EA IG: CPT

## 2020-02-04 PROCEDURE — 82570 ASSAY OF URINE CREATININE: CPT

## 2020-02-04 PROCEDURE — 85613 RUSSELL VIPER VENOM DILUTED: CPT

## 2020-02-04 PROCEDURE — 83516 IMMUNOASSAY NONANTIBODY: CPT

## 2020-02-04 PROCEDURE — 85732 THROMBOPLASTIN TIME PARTIAL: CPT

## 2020-02-04 PROCEDURE — 86376 MICROSOMAL ANTIBODY EACH: CPT

## 2020-02-04 PROCEDURE — 84165 PROTEIN E-PHORESIS SERUM: CPT

## 2020-02-04 PROCEDURE — 86200 CCP ANTIBODY: CPT

## 2020-02-04 PROCEDURE — 81001 URINALYSIS AUTO W/SCOPE: CPT

## 2020-02-06 LAB
ALBUMIN SERPL-MCNC: 3.9 G/DL (ref 2.9–4.4)
ALBUMIN/GLOB SERPL: 1.1 {RATIO} (ref 0.7–1.7)
ALPHA1 GLOB FLD ELPH-MCNC: 0.3 G/DL (ref 0–0.4)
ALPHA2 GLOB SERPL ELPH-MCNC: 0.9 G/DL (ref 0.4–1)
B-GLOBULIN SERPL ELPH-MCNC: 1.2 G/DL (ref 0.7–1.3)
CARDIOLIPIN IGA SER IA-ACNC: <9 APL U/ML (ref 0–11)
CARDIOLIPIN IGG SER IA-ACNC: <9 GPL U/ML (ref 0–14)
CARDIOLIPIN IGM SER IA-ACNC: 9 MPL U/ML (ref 0–12)
CENTROMERE B AB SER-ACNC: <0.2 AI (ref 0–0.9)
CHROMATIN AB SERPL-ACNC: 0.3 AI (ref 0–0.9)
DSDNA AB SER-ACNC: 1 IU/ML (ref 0–9)
ENA JO1 AB SER-ACNC: <0.2 AI (ref 0–0.9)
ENA RNP AB SER-ACNC: <0.2 AI (ref 0–0.9)
ENA SCL70 AB SER-ACNC: <0.2 AI (ref 0–0.9)
ENA SM AB SER-ACNC: <0.2 AI (ref 0–0.9)
ENA SS-A AB SER-ACNC: <0.2 AI (ref 0–0.9)
ENA SS-B AB SER-ACNC: <0.2 AI (ref 0–0.9)
ENDOMYSIUM IGA SER QL: NEGATIVE
GAMMA GLOB SERPL ELPH-MCNC: 1.4 G/DL (ref 0.4–1.8)
GLOBULIN SER CALC-MCNC: 3.7 G/DL (ref 2.2–3.9)
IGA SERPL-MCNC: 172 MG/DL (ref 87–352)
LA NT PLATELET PPP: 30.2 SEC (ref 0–51.9)
LUPUS ANTICOAGULANT REFLEX: NORMAL
Lab: NORMAL
Lab: NORMAL
M-SPIKE: NORMAL G/DL
PROT PATTERN SERPL ELPH-IMP: NORMAL
PROT SERPL-MCNC: 7.6 G/DL (ref 6–8.5)
SCREEN DRVVT: 32.8 SEC (ref 0–47)
THYROGLOB AB SERPL-ACNC: 4.6 IU/ML (ref 0–0.9)
THYROPEROXIDASE AB SERPL-ACNC: 228 IU/ML (ref 0–34)
TTG IGA SER-ACNC: <2 U/ML (ref 0–3)

## 2020-02-07 LAB
B2 GLYCOPROT1 IGA SER-ACNC: <9 GPI IGA UNITS (ref 0–25)
B2 GLYCOPROT1 IGG SER-ACNC: <9 GPI IGG UNITS (ref 0–20)
B2 GLYCOPROT1 IGM SER-ACNC: <9 GPI IGM UNITS (ref 0–32)
CCP IGA+IGG SERPL IA-ACNC: 9 UNITS (ref 0–19)

## 2020-02-11 LAB — REF LAB TEST RESULTS: NORMAL

## 2020-02-13 LAB
RF IGA SER-ACNC: 10.5 EU/ML
RF IGG SER-ACNC: 13.9 EU/ML
RF IGM SER-ACNC: 10.1 IU/ML

## 2020-02-14 LAB — 14-3-3 ETA AG SER IA-MCNC: <0.2 NG/ML

## 2020-02-17 ENCOUNTER — LAB (OUTPATIENT)
Dept: LAB | Facility: HOSPITAL | Age: 35
End: 2020-02-17

## 2020-02-17 DIAGNOSIS — E03.9 HYPOTHYROIDISM, UNSPECIFIED TYPE: ICD-10-CM

## 2020-02-17 DIAGNOSIS — E03.9 HYPOTHYROIDISM, UNSPECIFIED TYPE: Primary | ICD-10-CM

## 2020-02-17 LAB — TSH SERPL DL<=0.05 MIU/L-ACNC: 1.52 UIU/ML (ref 0.27–4.2)

## 2020-02-17 PROCEDURE — 84443 ASSAY THYROID STIM HORMONE: CPT

## 2020-02-17 PROCEDURE — 36415 COLL VENOUS BLD VENIPUNCTURE: CPT

## 2020-02-25 RX ORDER — AMOXICILLIN 500 MG/1
1000 CAPSULE ORAL 2 TIMES DAILY
Qty: 28 CAPSULE | Refills: 0 | Status: SHIPPED | OUTPATIENT
Start: 2020-02-25 | End: 2020-03-12

## 2020-03-12 ENCOUNTER — OFFICE VISIT (OUTPATIENT)
Dept: FAMILY MEDICINE CLINIC | Facility: CLINIC | Age: 35
End: 2020-03-12

## 2020-03-12 VITALS
BODY MASS INDEX: 22.32 KG/M2 | SYSTOLIC BLOOD PRESSURE: 98 MMHG | HEIGHT: 63 IN | OXYGEN SATURATION: 99 % | DIASTOLIC BLOOD PRESSURE: 60 MMHG | RESPIRATION RATE: 14 BRPM | HEART RATE: 82 BPM | WEIGHT: 126 LBS

## 2020-03-12 DIAGNOSIS — F51.01 PRIMARY INSOMNIA: ICD-10-CM

## 2020-03-12 DIAGNOSIS — F41.1 GAD (GENERALIZED ANXIETY DISORDER): Primary | ICD-10-CM

## 2020-03-12 PROCEDURE — 99213 OFFICE O/P EST LOW 20 MIN: CPT | Performed by: PHYSICIAN ASSISTANT

## 2020-03-12 RX ORDER — CITALOPRAM 10 MG/1
10 TABLET ORAL DAILY
Qty: 30 TABLET | Refills: 11 | Status: SHIPPED | OUTPATIENT
Start: 2020-03-12 | End: 2020-04-09

## 2020-03-13 NOTE — PROGRESS NOTES
Subjective   Regla West is a 34 y.o. female  Insomnia (Doesn't want to continue taking Xanax)      History of Present Illness  Patient is a pleasant 34-year-old white female comes in clinic insomnia needs refill of Xanax she states she is unsure if she should take it but she sleeps really well on it she states that she is had increased anxiety but she does not want increased dose of Xanax ,trouble focus concentration anxiety is worse over last couple of weeks denies any SI/HI.    The following portions of the patient's history were reviewed and updated as appropriate: allergies, current medications, past social history and problem list    Review of Systems   Constitutional: Negative for appetite change, diaphoresis, fatigue and unexpected weight change.   Eyes: Negative for visual disturbance.   Respiratory: Negative for cough, chest tightness and shortness of breath.    Cardiovascular: Negative for chest pain, palpitations and leg swelling.   Gastrointestinal: Negative for diarrhea, nausea and vomiting.   Endocrine: Negative for polydipsia, polyphagia and polyuria.   Skin: Negative for color change and rash.   Neurological: Negative for dizziness, syncope, weakness, light-headedness, numbness and headaches.       Objective     Vitals:    03/12/20 1150   BP: 98/60   Pulse: 82   Resp: 14   SpO2: 99%       Physical Exam   Constitutional: She appears well-developed and well-nourished.   Neck: Neck supple. No JVD present. No thyromegaly present.   Cardiovascular: Normal rate, regular rhythm, normal heart sounds, intact distal pulses and normal pulses.   No murmur heard.  Pulmonary/Chest: Effort normal and breath sounds normal. No respiratory distress.   Abdominal: Soft. Bowel sounds are normal. There is no hepatosplenomegaly. There is no tenderness.   Musculoskeletal: She exhibits no edema.   Lymphadenopathy:     She has no cervical adenopathy.   Neurological: No sensory deficit.   Skin: Skin is warm and dry. She is  not diaphoretic.   Nursing note and vitals reviewed.      Assessment/Plan     Regla was seen today for insomnia.    Diagnoses and all orders for this visit:    ANNIE (generalized anxiety disorder)  -     citalopram (CeleXA) 10 MG tablet; Take 1 tablet by mouth Daily.    Primary insomnia    Xanax 0.5 mg 1 p.o. nightly meds 30×5 refills    Follow-up if no better

## 2020-03-13 NOTE — PROGRESS NOTES
I have reviewed the notes, assessments, and/or procedures performed by Joselito Cole PA-C, I concur with his documentation of Regla West.

## 2020-04-06 ENCOUNTER — LAB (OUTPATIENT)
Dept: LAB | Facility: HOSPITAL | Age: 35
End: 2020-04-06

## 2020-04-06 DIAGNOSIS — G43.001 MIGRAINE WITHOUT AURA AND WITH STATUS MIGRAINOSUS, NOT INTRACTABLE: ICD-10-CM

## 2020-04-06 DIAGNOSIS — J32.9 RECURRENT SINUS INFECTIONS: ICD-10-CM

## 2020-04-06 DIAGNOSIS — E03.9 HYPOTHYROIDISM, UNSPECIFIED TYPE: Primary | ICD-10-CM

## 2020-04-06 LAB
ANION GAP SERPL CALCULATED.3IONS-SCNC: 11 MMOL/L (ref 5–15)
BUN BLD-MCNC: 13 MG/DL (ref 6–20)
BUN/CREAT SERPL: 16.9 (ref 7–25)
CALCIUM SPEC-SCNC: 9.5 MG/DL (ref 8.6–10.5)
CHLORIDE SERPL-SCNC: 103 MMOL/L (ref 98–107)
CO2 SERPL-SCNC: 26 MMOL/L (ref 22–29)
CREAT BLD-MCNC: 0.77 MG/DL (ref 0.57–1)
CRP SERPL-MCNC: 0.58 MG/DL (ref 0–0.5)
ERYTHROCYTE [DISTWIDTH] IN BLOOD BY AUTOMATED COUNT: 14.7 % (ref 12.3–15.4)
GFR SERPL CREATININE-BSD FRML MDRD: 85 ML/MIN/1.73
GLUCOSE BLD-MCNC: 84 MG/DL (ref 65–99)
HCT VFR BLD AUTO: 41.5 % (ref 34–46.6)
HGB BLD-MCNC: 13.4 G/DL (ref 12–15.9)
LYMPHOCYTES # BLD AUTO: 1.7 10*3/MM3 (ref 0.7–3.1)
LYMPHOCYTES NFR BLD AUTO: 36.7 % (ref 19.6–45.3)
MCH RBC QN AUTO: 29 PG (ref 26.6–33)
MCHC RBC AUTO-ENTMCNC: 32.2 G/DL (ref 31.5–35.7)
MCV RBC AUTO: 89.8 FL (ref 79–97)
MONOCYTES # BLD AUTO: 0.3 10*3/MM3 (ref 0.1–0.9)
MONOCYTES NFR BLD AUTO: 6.7 % (ref 5–12)
NEUTROPHILS # BLD AUTO: 2.5 10*3/MM3 (ref 1.7–7)
NEUTROPHILS NFR BLD AUTO: 56.6 % (ref 42.7–76)
PLATELET # BLD AUTO: 203 10*3/MM3 (ref 140–450)
PMV BLD AUTO: 7.1 FL (ref 6–12)
POTASSIUM BLD-SCNC: 3.7 MMOL/L (ref 3.5–5.2)
RBC # BLD AUTO: 4.62 10*6/MM3 (ref 3.77–5.28)
SODIUM BLD-SCNC: 140 MMOL/L (ref 136–145)
WBC NRBC COR # BLD: 4.5 10*3/MM3 (ref 3.4–10.8)

## 2020-04-06 PROCEDURE — 80048 BASIC METABOLIC PNL TOTAL CA: CPT

## 2020-04-06 PROCEDURE — 86140 C-REACTIVE PROTEIN: CPT

## 2020-04-06 PROCEDURE — 85025 COMPLETE CBC W/AUTO DIFF WBC: CPT

## 2020-04-06 PROCEDURE — 86038 ANTINUCLEAR ANTIBODIES: CPT

## 2020-04-06 PROCEDURE — 36415 COLL VENOUS BLD VENIPUNCTURE: CPT

## 2020-04-07 LAB — ANA SER QL: NEGATIVE

## 2020-04-09 ENCOUNTER — TELEMEDICINE (OUTPATIENT)
Dept: FAMILY MEDICINE CLINIC | Facility: CLINIC | Age: 35
End: 2020-04-09

## 2020-04-09 DIAGNOSIS — F41.1 GENERALIZED ANXIETY DISORDER: ICD-10-CM

## 2020-04-09 DIAGNOSIS — G44.209 TENSION HEADACHE: Primary | ICD-10-CM

## 2020-04-09 PROCEDURE — 99214 OFFICE O/P EST MOD 30 MIN: CPT | Performed by: PHYSICIAN ASSISTANT

## 2020-04-09 RX ORDER — ESCITALOPRAM OXALATE 10 MG/1
10 TABLET ORAL DAILY
Qty: 30 TABLET | Refills: 11 | Status: SHIPPED | OUTPATIENT
Start: 2020-04-09 | End: 2021-07-09 | Stop reason: SDUPTHER

## 2020-04-09 RX ORDER — METHYLPREDNISOLONE 4 MG/1
TABLET ORAL
Qty: 21 EACH | Refills: 0 | Status: SHIPPED | OUTPATIENT
Start: 2020-04-09 | End: 2020-09-17

## 2020-04-09 NOTE — PROGRESS NOTES
Subjective   Regla West is a 34 y.o. female  Headache and Anxiety      History of Present Illness  White female who comes in complaining of headache describes headache is frontal in nature throbbing 7 out of 10, describes the pain is dull to dull throbbing frontal headache radiating to the back no nausea no vomiting no lightheadedness no dizziness she is been under a lot of stress lately due to work and home patient states that she is been more anxious Celexa not working seem to be making her headaches worse she has trouble concentrating trouble focusing.  Her headache is worsened with anxiety she states both anxiety and headaches are worse over the last week.  She has been taking over-the-counter Motrin with some relief citalopram is not working for her anxiety      The following portions of the patient's history were reviewed and updated as appropriate: allergies, current medications, past social history and problem list    Review of Systems   Constitutional: Negative for appetite change, diaphoresis, fatigue and unexpected weight change.   HENT: Positive for sinus pressure and sinus pain.    Eyes: Negative for visual disturbance.   Respiratory: Negative for cough, chest tightness and shortness of breath.    Cardiovascular: Negative for chest pain, palpitations and leg swelling.   Gastrointestinal: Negative for diarrhea, nausea and vomiting.   Endocrine: Negative for polydipsia, polyphagia and polyuria.   Genitourinary: Negative.    Musculoskeletal: Negative.    Skin: Negative for color change and rash.   Neurological: Positive for headaches. Negative for dizziness, syncope, weakness, light-headedness and numbness.   Psychiatric/Behavioral: Positive for agitation. The patient is nervous/anxious.        Objective     There were no vitals filed for this visit.    Physical Exam   Constitutional: She appears well-developed and well-nourished.   Psychiatric: She has a normal mood and affect. Her behavior is normal.  Judgment and thought content normal.       Assessment/Plan     Regla was seen today for headache and anxiety.    Diagnoses and all orders for this visit:    Tension headache    Generalized anxiety disorder  -     escitalopram (Lexapro) 10 MG tablet; Take 1 tablet by mouth Daily.    #1 Fioricet 1 p.o. every six 8-hour dispense 20 PRN headache    Medrol Dosepak    2.  DC citalopram start Lexapro 10 mg 1 p.o. every day dispense 30×11 refills    20 minutes spent with patient discussion symptoms reviewing notes and going over treatment plan

## 2020-04-23 ENCOUNTER — OFFICE VISIT (OUTPATIENT)
Dept: NEUROLOGY | Facility: CLINIC | Age: 35
End: 2020-04-23

## 2020-04-23 VITALS
WEIGHT: 124 LBS | HEIGHT: 63 IN | DIASTOLIC BLOOD PRESSURE: 60 MMHG | BODY MASS INDEX: 21.97 KG/M2 | OXYGEN SATURATION: 97 % | HEART RATE: 91 BPM | SYSTOLIC BLOOD PRESSURE: 98 MMHG

## 2020-04-23 DIAGNOSIS — G44.229 CHRONIC TENSION-TYPE HEADACHE, NOT INTRACTABLE: Primary | ICD-10-CM

## 2020-04-23 DIAGNOSIS — F41.9 ANXIETY: ICD-10-CM

## 2020-04-23 DIAGNOSIS — R42 DIZZINESS: ICD-10-CM

## 2020-04-23 DIAGNOSIS — G43.119 INTRACTABLE MIGRAINE WITH AURA WITHOUT STATUS MIGRAINOSUS: ICD-10-CM

## 2020-04-23 PROCEDURE — 99204 OFFICE O/P NEW MOD 45 MIN: CPT | Performed by: PSYCHIATRY & NEUROLOGY

## 2020-04-23 RX ORDER — TRAZODONE HYDROCHLORIDE 50 MG/1
50 TABLET ORAL NIGHTLY
Qty: 30 TABLET | Refills: 1 | Status: SHIPPED | OUTPATIENT
Start: 2020-04-23 | End: 2020-08-26

## 2020-04-23 NOTE — PROGRESS NOTES
Subjective:    CC: Regla West is seen today in consultation at the request of Joselito LERNER for Headache and Dizziness       HPI:  Patient is a 34-year-old female with past medical history of hypothyroidism and recently diagnosed autoimmune thyroiditis/Hashimoto's thyroiditis referred to the clinic for evaluation for headaches and dizziness.  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  She reports history of infrequent episodic migraines in the past however since December, she has been having dull bifrontal headache ranging from 3-5 out of 10 intensity headache.  She reports that most days in a week, she would have dull bifrontal headache.  She denies any associated light or sound sensitivity or nausea or vomiting with this headache.  It usually stays in bifrontal region without any radiation of pain.  In addition, she is having dizzy spells on and off since October which became more intense and frequent in December.  She reports that for couple of months they were pretty bad exacerbated by sudden movements and lately in the last 4 to 5 weeks, they seem to be less intense.  She reports that more than the headache, dizziness is of concern to her.  She also reports increased amount of anxiety in last few weeks because by managing home and work due to ongoing coronavirus pandemic.  She was on Celexa to help with anxiety for last several months but it was not helping so it was recently changed to Lexapro 10 mg 2 weeks ago.  She reports that it has helped somewhat better than Celexa but still she has episodes of increased anxiety.  She has a detailed autoimmune work-up done after she was found to have a positive ALEKSANDER and has had consultation with rheumatology.  No definitive autoimmune pathology was found on their work-up except for autoimmune thyroiditis for which she is on Synthroid.  She also follows with endocrinology regularly for management of autoimmune thyroiditis with latest TSH levels  within the range.  Currently, she takes Tylenol or ibuprofen as needed at least 4-5 times in a week.  It does help with headache somewhat.  In the past, she was on amitriptyline for migraine prevention and it did help her.  This was tried 5 years ago.  She underwent MRI brain without contrast back in year 2018 because of complaint of headaches and dizziness which I reviewed personally and it did not reveal any intracranial abnormalities.  She reports that her sleep is not good and on average she gets      the following portions of the patient's history were reviewed today and updated as of 04/23/2020  : allergies, social history and problem list.  This document will be scanned to patient's chart.      Current Outpatient Medications:   •  ALPRAZolam (XANAX) 0.5 MG tablet, Take 1 tablet by mouth every night at bedtime., Disp: 30 tablet, Rfl: 3  •  cyclobenzaprine (FLEXERIL) 10 MG tablet, Take 1 tablet by mouth 3 (Three) Times a Day As Needed for Muscle Spasms., Disp: 30 tablet, Rfl: 1  •  escitalopram (Lexapro) 10 MG tablet, Take 1 tablet by mouth Daily., Disp: 30 tablet, Rfl: 11  •  levothyroxine (SYNTHROID) 88 MCG tablet, Take 1 tablet by mouth Daily., Disp: 90 tablet, Rfl: 3  •  loratadine (CLARITIN) 10 MG tablet, Take 10 mg by mouth Daily., Disp: , Rfl:   •  Multiple Vitamins-Minerals (HAIR SKIN AND NAILS FORMULA PO), Take  by mouth., Disp: , Rfl:   •  rizatriptan (MAXALT) 10 MG tablet, Take 1 tablet by mouth 1 (One) Time As Needed for Migraine for up to 1 dose. May repeat in 2 hours if needed, Disp: 9 tablet, Rfl: 1  •  butalbital-acetaminophen-caffeine (FIORICET, ESGIC) -40 MG per tablet, Take 1 tablet by mouth Every 6-8 Hours As Needed., Disp: 18 tablet, Rfl: 0  •  melatonin 5 MG tablet tablet, Take 5 mg by mouth Every Night., Disp: , Rfl:   •  methylPREDNISolone (MEDROL, LUKAS,) 4 MG tablet, Take as directed on package instructions., Disp: 21 each, Rfl: 0  •  traZODone (DESYREL) 50 MG tablet, Take 1 tablet  "by mouth Every Night., Disp: 30 tablet, Rfl: 1   Past Medical History:   Diagnosis Date   • Anemia     Taking iron   • Disease of thyroid gland     Takes synthroid   • Headache    • Pain in joint, lower leg       Past Surgical History:   Procedure Laterality Date   •  SECTION WITH TUBAL Bilateral 7/3/2018    Procedure:  SECTION PRIMARY WITH TUBAL * 39 WKS *;  Surgeon: Cinda Greenberg MD;  Location: [facility] LABOR DELIVERY;  Service: Obstetrics/Gynecology   • SEPTOPLASTY      2017   • WISDOM TOOTH EXTRACTION        Family History   Problem Relation Age of Onset   • Hyperlipidemia Mother    • Hypertension Father    • Hyperlipidemia Father    • Thyroid disease Father    • Breast cancer Neg Hx    • Ovarian cancer Neg Hx       Review of Systems   Constitutional: Positive for fatigue and fever.   HENT: Positive for rhinorrhea.    Eyes: Positive for photophobia and pain.   Respiratory: Negative.    Cardiovascular: Negative.    Gastrointestinal: Negative.    Endocrine: Negative.    Genitourinary: Negative.    Musculoskeletal: Positive for arthralgias, joint swelling, neck pain and neck stiffness.   Neurological: Positive for dizziness and light-headedness.   Psychiatric/Behavioral: The patient is nervous/anxious.        All other systems reviewed and are negative     Objective:    BP 98/60   Pulse 91   Ht 160 cm (63\")   Wt 56.2 kg (124 lb)   SpO2 97%   BMI 21.97 kg/m²     Neurology Exam:    General apperance: NAD.     Mental status: Alert, awake and oriented to time place and person.    Recent and Remote memory: Can recall 3/3 objects at 5 minutes. Can recall historical events.     Attention span and Concentration: Serial 7s: Normal.     Fund of knowledge:  Normal.     Language and Speech: No aphasia or dysarthria.    Naming , Repitition and Comprehension:  Can name objects, repeat a sentence and follow commands. Speech is clear and fluent with good repetition, comprehension, and naming.    Cranial Nerves: "   CN II: Visual fields are full. Intact. Fundi - Normal, No papillederma, Pupils - CALI  CN III, IV and VI: Extraocular movements are intact. Normal saccades.   CN V: Facial sensation is intact.   CN VII: Muscles of facial expression reveal no asymmetry. Intact.   CN VIII: Hearing is intact. Whispered voice intact.   CN IX and X: Palate elevates symmetrically. Intact  CN XI: Shoulder shrug is intact.   CN XII: Tongue is midline without evidence of atrophy or fasciculation.     Motor:  Right UE muscle strength 5/5. Normal tone.     Left UE muscle strength 5/5. Normal tone.      Right LE muscle strength5/5. Normal tone.     Left LE muscle strength 5/5. Normal tone.      Sensory: Normal light touch, vibration and pinprick sensation bilaterally.    DTRs: 2+ bilaterally in upper and lower extremities.    Babinski: Negative bilaterally.    Co-ordination: Normal finger-to-nose, heel to shin B/L.    Rhomberg: Negative.    Gait: Normal.    Cardiovascular: Regular rate and rhythm without murmur, gallop or rub.    Ophthalmoscopic exam: Normal fundi, no papilledema.    Assessment and Plan:  1. Chronic tension-type headache, not intractable  2. Dizziness  3. Intractable migraine with aura without status migrainosus  4. Anxiety  5. Insomnia  -Patient with dull bifrontal headache since October which has become somewhat more frequent in last 4 to 6 weeks.  In addition, she reports associated anxiety, dizzy spells and difficulty with sleep.  She does report history of migraines but usually they are more intense associated with light and sound sensitivity as well as nausea and vomiting.  Migraine seems to be under good control at the moment.  I have advised her not to take Tylenol or ibuprofen more than twice in a week to avoid possibility of rebound headaches.  Currently she is on Lexapro 10 mg which is helped somewhat better than Celexa to control her anxiety.  Her neurological examination was reassuringly normal but I would like  to get MRI brain without contrast for further evaluation as her last MRI was back in year 2018.  I discussed about switching Lexapro to either Effexor or Cymbalta in future if Lexapro is not very helpful and anxiety.  Both Effexor and Cymbalta can help significantly with anxiety as well as in controlling tension type headaches.  This will be discussed on next visit based on her response to Lexapro.  For now, I would like to start her on trazodone 25 mg at bedtime to improve sleep and see how she does.  If it helps then it can be always increased to 50 mg dose.  I have advised her to reduce and stop taking Xanax to help with sleep.  Will also check vitamin B12, vitamin D levels at it has been never checked.  Low vitamin B12 level can certainly cause fatigue, tiredness.  I will see her back in clinic in 6 weeks for follow-up.    - MRI Brain Without Contrast; Future  - Vitamin B12; Future  - Methylmalonic Acid, Serum; Future  - Vitamin D 1,25 Dihydroxy; Future  Return in about 6 weeks (around 6/4/2020).     Jake Buck MD

## 2020-04-24 ENCOUNTER — LAB (OUTPATIENT)
Dept: LAB | Facility: HOSPITAL | Age: 35
End: 2020-04-24

## 2020-04-24 DIAGNOSIS — R42 DIZZINESS: ICD-10-CM

## 2020-04-24 DIAGNOSIS — F41.9 ANXIETY: ICD-10-CM

## 2020-04-24 LAB — VIT B12 BLD-MCNC: 618 PG/ML (ref 211–946)

## 2020-04-24 PROCEDURE — 82652 VIT D 1 25-DIHYDROXY: CPT

## 2020-04-24 PROCEDURE — 82607 VITAMIN B-12: CPT

## 2020-04-24 PROCEDURE — 83921 ORGANIC ACID SINGLE QUANT: CPT

## 2020-04-24 PROCEDURE — 36415 COLL VENOUS BLD VENIPUNCTURE: CPT

## 2020-04-27 LAB
1,25(OH)2D3 SERPL-MCNC: 51.2 PG/ML (ref 19.9–79.3)
Lab: NORMAL
METHYLMALONATE SERPL-SCNC: 125 NMOL/L (ref 0–378)

## 2020-04-28 ENCOUNTER — HOSPITAL ENCOUNTER (OUTPATIENT)
Dept: MRI IMAGING | Facility: HOSPITAL | Age: 35
Discharge: HOME | End: 2020-04-28
Admitting: PSYCHIATRY & NEUROLOGY

## 2020-04-28 DIAGNOSIS — G44.229 CHRONIC TENSION-TYPE HEADACHE, NOT INTRACTABLE: ICD-10-CM

## 2020-04-28 DIAGNOSIS — R42 DIZZINESS: ICD-10-CM

## 2020-04-28 DIAGNOSIS — G43.119 INTRACTABLE MIGRAINE WITH AURA WITHOUT STATUS MIGRAINOSUS: ICD-10-CM

## 2020-04-28 PROCEDURE — 70551 MRI BRAIN STEM W/O DYE: CPT

## 2020-04-30 ENCOUNTER — TELEPHONE (OUTPATIENT)
Dept: NEUROLOGY | Facility: CLINIC | Age: 35
End: 2020-04-30

## 2020-06-16 ENCOUNTER — LAB (OUTPATIENT)
Dept: LAB | Facility: HOSPITAL | Age: 35
End: 2020-06-16

## 2020-06-16 DIAGNOSIS — E03.9 HYPOTHYROIDISM, UNSPECIFIED TYPE: ICD-10-CM

## 2020-06-16 DIAGNOSIS — E03.9 HYPOTHYROIDISM, UNSPECIFIED TYPE: Primary | ICD-10-CM

## 2020-06-16 LAB
T4 FREE SERPL-MCNC: 1.16 NG/DL (ref 0.93–1.7)
TSH SERPL DL<=0.05 MIU/L-ACNC: 1.27 UIU/ML (ref 0.27–4.2)

## 2020-06-16 PROCEDURE — 84439 ASSAY OF FREE THYROXINE: CPT

## 2020-06-16 PROCEDURE — 36415 COLL VENOUS BLD VENIPUNCTURE: CPT

## 2020-06-16 PROCEDURE — 84443 ASSAY THYROID STIM HORMONE: CPT

## 2020-07-06 RX ORDER — METFORMIN HYDROCHLORIDE 500 MG/1
500 TABLET, EXTENDED RELEASE ORAL
Qty: 30 TABLET | Refills: 2 | Status: SHIPPED | OUTPATIENT
Start: 2020-07-06 | End: 2020-09-17

## 2020-07-15 ENCOUNTER — OFFICE VISIT (OUTPATIENT)
Dept: NEUROLOGY | Facility: CLINIC | Age: 35
End: 2020-07-15

## 2020-07-15 VITALS
DIASTOLIC BLOOD PRESSURE: 60 MMHG | WEIGHT: 127 LBS | HEIGHT: 63 IN | HEART RATE: 75 BPM | SYSTOLIC BLOOD PRESSURE: 96 MMHG | BODY MASS INDEX: 22.5 KG/M2 | OXYGEN SATURATION: 98 %

## 2020-07-15 DIAGNOSIS — R42 DIZZINESS: ICD-10-CM

## 2020-07-15 DIAGNOSIS — G44.229 CHRONIC TENSION-TYPE HEADACHE, NOT INTRACTABLE: Primary | ICD-10-CM

## 2020-07-15 DIAGNOSIS — F41.9 ANXIETY: ICD-10-CM

## 2020-07-15 PROCEDURE — 99214 OFFICE O/P EST MOD 30 MIN: CPT | Performed by: PSYCHIATRY & NEUROLOGY

## 2020-07-15 RX ORDER — TRETINOIN 0.5 MG/G
CREAM TOPICAL NIGHTLY
Qty: 20 G | Refills: 2 | Status: SHIPPED | OUTPATIENT
Start: 2020-07-15 | End: 2020-12-11

## 2020-07-15 NOTE — PROGRESS NOTES
Subjective:    CC: Regla West is in clinic today for follow up for episodes of dizziness, episodic tension headaches and migraines.    HPI:  Initial visit: 4/23/2020: Patient is a 34-year-old female with past medical history of hypothyroidism and recently diagnosed autoimmune thyroiditis/Hashimoto's thyroiditis referred to the clinic for evaluation for headaches and dizziness.  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  She reports history of infrequent episodic migraines in the past however since December, she has been having dull bifrontal headache ranging from 3-5 out of 10 intensity headache.  She reports that most days in a week, she would have dull bifrontal headache.  She denies any associated light or sound sensitivity or nausea or vomiting with this headache.  It usually stays in bifrontal region without any radiation of pain.  In addition, she is having dizzy spells on and off since October which became more intense and frequent in December.  She reports that for couple of months they were pretty bad exacerbated by sudden movements and lately in the last 4 to 5 weeks, they seem to be less intense.  She reports that more than the headache, dizziness is of concern to her.  She also reports increased amount of anxiety in last few weeks because by managing home and work due to ongoing coronavirus pandemic.  She was on Celexa to help with anxiety for last several months but it was not helping so it was recently changed to Lexapro 10 mg 2 weeks ago.  She reports that it has helped somewhat better than Celexa but still she has episodes of increased anxiety.  She has a detailed autoimmune work-up done after she was found to have a positive ALEKSANDER and has had consultation with rheumatology.  No definitive autoimmune pathology was found on their work-up except for autoimmune thyroiditis for which she is on Synthroid.  She also follows with endocrinology regularly for management of autoimmune  thyroiditis with latest TSH levels within the range.  Currently, she takes Tylenol or ibuprofen as needed at least 4-5 times in a week.  It does help with headache somewhat.  In the past, she was on amitriptyline for migraine prevention and it did help her.  This was tried 5 years ago.  She underwent MRI brain without contrast back in year 2018 because of complaint of headaches and dizziness which I reviewed personally and it did not reveal any intracranial abnormalities.  She reports that her sleep is not good and on average she gets 3 to 4 hours of sleep at night.    7/15/2020: She is in clinic for regular follow-up.  Since her last visit, she has been taking trazodone 25 mg at bedtime along with melatonin and for the most nights, she is now sleeping better and getting more hours of sleep at night than before.  She reports that Lexapro 10 mg is helping in keeping anxiety under control.  Currently, she is getting on an average 1-2 mild 3-4 out of 10 intensity headache starting in the neck area and then radiating up to the top of the head.  She does report chronic neck area muscle tension and also tension involving the parascapular muscles.  She has tried Flexeril in the past but it made her really sleepy so currently she is not taking it.  She takes ibuprofen as needed for mild to moderate intensity headaches and it seems to be working.  For intense migraines, she takes Maxalt and it seems to be helping as well.    The following portions of the patient's history were reviewed and updated as of 07/15/2020: allergies, social history and problem list.       Current Outpatient Medications:   •  traZODone (DESYREL) 50 MG tablet, Take 1 tablet by mouth Every Night. (Patient taking differently: Take 25 mg by mouth Every Night.), Disp: 30 tablet, Rfl: 1  •  escitalopram (Lexapro) 10 MG tablet, Take 1 tablet by mouth Daily., Disp: 30 tablet, Rfl: 11  •  levothyroxine (Synthroid) 88 MCG tablet, Take 1 tablet by mouth Daily.,  Disp: 90 tablet, Rfl: 3  •  levothyroxine (SYNTHROID, LEVOTHROID) 88 MCG tablet, Take 1 tablet by mouth Daily., Disp: 90 tablet, Rfl: 3  •  loratadine (CLARITIN) 10 MG tablet, Take 10 mg by mouth Daily., Disp: , Rfl:   •  melatonin 5 MG tablet tablet, Take 5 mg by mouth Every Night., Disp: , Rfl:   •  metFORMIN ER (Glucophage XR) 500 MG 24 hr tablet, Take 1 tablet by mouth Daily With Breakfast., Disp: 30 tablet, Rfl: 2  •  methylPREDNISolone (MEDROL, LUKAS,) 4 MG tablet, Take as directed on package instructions., Disp: 21 each, Rfl: 0  •  Multiple Vitamins-Minerals (HAIR SKIN AND NAILS FORMULA PO), Take  by mouth., Disp: , Rfl:   •  rizatriptan (MAXALT) 10 MG tablet, Take 1 tablet by mouth 1 (One) Time As Needed for Migraine for up to 1 dose. May repeat in 2 hours if needed, Disp: 9 tablet, Rfl: 1  •  tretinoin (Retin-A) 0.05 % cream, Apply topically to the appropriate area as directed Every Night., Disp: 20 g, Rfl: 2   Past Medical History:   Diagnosis Date   • Anemia     Taking iron   • Disease of thyroid gland     Takes synthroid   • Headache    • Migraine    • Pain in joint, lower leg       Past Surgical History:   Procedure Laterality Date   •  SECTION WITH TUBAL Bilateral 7/3/2018    Procedure:  SECTION PRIMARY WITH TUBAL * 39 WKS *;  Surgeon: Cinda Greenberg MD;  Location: [facility] LABOR DELIVERY;  Service: Obstetrics/Gynecology   • SEPTOPLASTY         • WISDOM TOOTH EXTRACTION        Family History   Problem Relation Age of Onset   • Hyperlipidemia Mother    • Hypertension Father    • Hyperlipidemia Father    • Thyroid disease Father    • Breast cancer Neg Hx    • Ovarian cancer Neg Hx         Review of Systems   Cardiovascular: Positive for palpitations.   Musculoskeletal: Positive for myalgias and neck stiffness.   Neurological: Positive for dizziness and headache.   Psychiatric/Behavioral: Positive for sleep disturbance. The patient is nervous/anxious.    All other systems reviewed and are  "negative.    Objective:    BP 96/60   Pulse 75   Ht 160 cm (63\")   Wt 57.6 kg (127 lb)   SpO2 98%   BMI 22.50 kg/m²     Neurology Exam:  General apperance: NAD.     Mental status: Alert, awake and oriented to time place and person.    Recent and Remote memory: Can recall 3/3 objects at 5 minutes. Can recall historical events.     Attention span and Concentration: Serial 7s: Normal.     Fund of knowledge:  Normal.     Language and Speech: No aphasia or dysarthria.    Naming , Repitition and Comprehension:  Can name objects, repeat a sentence and follow commands. Speech is clear and fluent with good repetition, comprehension, and naming.    CN II to XII: Intact.    Opthalmoscopic Exam: No papilledema.    Motor:  Right UE muscle strength 5/5. Normal tone.     Left UE muscle strength 5/5. Normal tone.      Right LE muscle strength5/5. Normal tone.     Left LE muscle strength 5/5. Normal tone.      Sensory: Normal light touch, vibration and pinprick sensation bilaterally.    DTRs: 2+ bilaterally.    Babinski: Negative bilaterally.    Co-ordination: Normal finger-to-nose, heel to shin B/L.    Rhomberg: Negative.    Gait: Normal.    Cardiovascular: Regular rate and rhythm without murmur, gallop or rub.    Assessment and Plan:  1. Chronic tension-type headache, not intractable  2. Dizziness  3. Anxiety  4. Migraines:  -Overall, migraine frequency and intensity remains under good control.  Sleep is better with her low-dose trazodone 25 mg daily.  She is getting 1-2 dull/mild intensity tension type headaches in a week.  She is reporting a lot of neck muscle and parascapular muscle tension which is likely contributing to her headaches.  I am going to schedule her for trigger point injection in this muscles and see how she does.  I will see her back in 2 weeks for this otherwise, continue with trazodone, Lexapro as per schedule.  In future, if tension headache frequency becomes worse then plan is to either consider " TROKENDI XR or switch Lexapro to either Cymbalta and Effexor.       I spent 25 minutes face to face with the patient and spent more than 50% of this time  in management, instructions and education regarding above mentioned diagnosis and also on counseling and discussing about taking medication regularly, possible side effects with medication use, importance of good sleep hygiene, good hydration and regular exercise.    Return in about 2 weeks (around 7/29/2020) for Trigger point..

## 2020-07-24 ENCOUNTER — CLINICAL SUPPORT (OUTPATIENT)
Dept: NEUROLOGY | Facility: CLINIC | Age: 35
End: 2020-07-24

## 2020-07-24 VITALS
HEIGHT: 63 IN | DIASTOLIC BLOOD PRESSURE: 60 MMHG | OXYGEN SATURATION: 98 % | BODY MASS INDEX: 22.5 KG/M2 | WEIGHT: 127 LBS | HEART RATE: 83 BPM | TEMPERATURE: 98.2 F | SYSTOLIC BLOOD PRESSURE: 96 MMHG

## 2020-07-24 DIAGNOSIS — G44.229 CHRONIC TENSION-TYPE HEADACHE, NOT INTRACTABLE: Primary | ICD-10-CM

## 2020-07-24 DIAGNOSIS — G43.119 INTRACTABLE MIGRAINE WITH AURA WITHOUT STATUS MIGRAINOSUS: ICD-10-CM

## 2020-07-24 PROCEDURE — 20553 NJX 1/MLT TRIGGER POINTS 3/>: CPT | Performed by: PSYCHIATRY & NEUROLOGY

## 2020-07-24 NOTE — PROGRESS NOTES
CC: TENSION HEADACHES     Procedure Note:  1.         Patient was positioned comfortably  2.         Before injections are started, 10 Trigger Points sites are identified throughout the bilateral upper trapezius muscle, levator scapulae, and erector spinae muscles.    3.         Overlying skin area was cleaned with Alcohol swab                                                                                                              4.         Before injection, trigger points sites were palpated for local twitch and referred pain to confirms placement                         5.         Local anesthetic 10 cc of bupivacaine 0.5%  Used  for a total of 10 cc)  6.         30 gauge ½ inch needle was utilized to ensure patient comfort          7.         I started with the most tender spot in above mentioned Trigger Points   1.   Localize most tender spot within taut muscle-fibers  2.   Fix tender spot between fingers (1-2 cm in size)   1.   Prevents from rolling away from needle  2.   Controls subcutaneous bleeding  3.   Before injection, patient was instructed of possible pain on injection  4.   Direct needle at 30 degree angle off skin   8.         Insert needle into skin 1-2 cm from Trigger Point   9.         Advance needle into Trigger Point   10.       Use 1cc anesthetic at each Trigger Points identified in step #2  11.       Redirect needle and reinject                                                                                              1.   Withdraw needle to subcutaneous tissue  2.   Redirect needle into adjacent tender areas  3.   Repeat until local twitch or tautness resolves  12.   After each of the 10 injections I held direct pressure at injection site for 2 minutes to prevents hematoma formation  13.   The same procedure was repeated for other Tender Points located in the upper trapezius muscle, levator scapulae and erector spinae bilaterally  14.   Patient was instructed to gently stretch injected  areas to full active range of motion in all directions and was instructed to repeat range of motion three times after injection  15.   Post-Procedure patient was instructed to avoid over-using injected area for 3-4 days   1.   Maintain active range of motion of injected muscle  2.   Patient applies ice to injected areas for a few hours  3.   Anticipate post-injection soreness for 3-4 days  There was no evidence of complications from injection was noted such as local Skin Infection  at injection site or local hematoma at injection site

## 2020-08-12 DIAGNOSIS — R50.9 FEVER, UNSPECIFIED FEVER CAUSE: Primary | ICD-10-CM

## 2020-08-12 DIAGNOSIS — J02.9 SORE THROAT: ICD-10-CM

## 2020-08-12 LAB — SARS-COV-2 RNA RESP QL NAA+PROBE: NOT DETECTED

## 2020-08-12 PROCEDURE — C9803 HOPD COVID-19 SPEC COLLECT: HCPCS | Performed by: HOSPITALIST

## 2020-08-12 PROCEDURE — 87635 SARS-COV-2 COVID-19 AMP PRB: CPT | Performed by: HOSPITALIST

## 2020-08-17 ENCOUNTER — LAB (OUTPATIENT)
Dept: LAB | Facility: HOSPITAL | Age: 35
End: 2020-08-17

## 2020-08-17 DIAGNOSIS — E03.9 HYPOTHYROIDISM, UNSPECIFIED TYPE: Primary | ICD-10-CM

## 2020-08-17 DIAGNOSIS — N93.9 ABNORMAL UTERINE BLEEDING (AUB): ICD-10-CM

## 2020-08-17 DIAGNOSIS — E03.9 HYPOTHYROIDISM, UNSPECIFIED TYPE: ICD-10-CM

## 2020-08-17 LAB
ERYTHROCYTE [DISTWIDTH] IN BLOOD BY AUTOMATED COUNT: 13.9 % (ref 12.3–15.4)
FERRITIN SERPL-MCNC: 22.76 NG/ML (ref 13–150)
HCT VFR BLD AUTO: 39.4 % (ref 34–46.6)
HGB BLD-MCNC: 12.9 G/DL (ref 12–15.9)
IRON 24H UR-MRATE: 87 MCG/DL (ref 37–145)
IRON SATN MFR SERPL: 23 % (ref 20–50)
LYMPHOCYTES # BLD AUTO: 1.5 10*3/MM3 (ref 0.7–3.1)
LYMPHOCYTES NFR BLD AUTO: 37.1 % (ref 19.6–45.3)
MCH RBC QN AUTO: 30.2 PG (ref 26.6–33)
MCHC RBC AUTO-ENTMCNC: 32.8 G/DL (ref 31.5–35.7)
MCV RBC AUTO: 91.9 FL (ref 79–97)
MONOCYTES # BLD AUTO: 0.3 10*3/MM3 (ref 0.1–0.9)
MONOCYTES NFR BLD AUTO: 6.9 % (ref 5–12)
NEUTROPHILS NFR BLD AUTO: 2.2 10*3/MM3 (ref 1.7–7)
NEUTROPHILS NFR BLD AUTO: 56 % (ref 42.7–76)
PLATELET # BLD AUTO: 190 10*3/MM3 (ref 140–450)
PMV BLD AUTO: 6.6 FL (ref 6–12)
RBC # BLD AUTO: 4.29 10*6/MM3 (ref 3.77–5.28)
TIBC SERPL-MCNC: 380 MCG/DL (ref 298–536)
TRANSFERRIN SERPL-MCNC: 255 MG/DL (ref 200–360)
TSH SERPL DL<=0.05 MIU/L-ACNC: 3.1 UIU/ML (ref 0.27–4.2)
WBC # BLD AUTO: 4 10*3/MM3 (ref 3.4–10.8)

## 2020-08-17 PROCEDURE — 36415 COLL VENOUS BLD VENIPUNCTURE: CPT

## 2020-08-17 PROCEDURE — 84466 ASSAY OF TRANSFERRIN: CPT

## 2020-08-17 PROCEDURE — 84443 ASSAY THYROID STIM HORMONE: CPT

## 2020-08-17 PROCEDURE — 85025 COMPLETE CBC W/AUTO DIFF WBC: CPT

## 2020-08-17 PROCEDURE — 83540 ASSAY OF IRON: CPT

## 2020-08-17 PROCEDURE — 82728 ASSAY OF FERRITIN: CPT

## 2020-08-26 RX ORDER — TRAZODONE HYDROCHLORIDE 50 MG/1
50 TABLET ORAL NIGHTLY
Qty: 30 TABLET | Refills: 1 | Status: SHIPPED | OUTPATIENT
Start: 2020-08-26 | End: 2020-12-21 | Stop reason: SDUPTHER

## 2020-09-11 ENCOUNTER — LAB (OUTPATIENT)
Dept: LAB | Facility: HOSPITAL | Age: 35
End: 2020-09-11

## 2020-09-11 DIAGNOSIS — E03.9 HYPOTHYROIDISM, UNSPECIFIED TYPE: Primary | ICD-10-CM

## 2020-09-11 DIAGNOSIS — E03.9 HYPOTHYROIDISM, UNSPECIFIED TYPE: ICD-10-CM

## 2020-09-11 LAB — TSH SERPL DL<=0.05 MIU/L-ACNC: 2.61 UIU/ML (ref 0.27–4.2)

## 2020-09-11 PROCEDURE — C9803 HOPD COVID-19 SPEC COLLECT: HCPCS

## 2020-09-11 PROCEDURE — 86769 SARS-COV-2 COVID-19 ANTIBODY: CPT

## 2020-09-11 PROCEDURE — 84443 ASSAY THYROID STIM HORMONE: CPT

## 2020-09-11 PROCEDURE — 36415 COLL VENOUS BLD VENIPUNCTURE: CPT

## 2020-09-12 LAB — SARS-COV-2 AB SERPL QL IA: NEGATIVE

## 2020-09-17 ENCOUNTER — OFFICE VISIT (OUTPATIENT)
Dept: NEUROLOGY | Facility: CLINIC | Age: 35
End: 2020-09-17

## 2020-09-17 VITALS
DIASTOLIC BLOOD PRESSURE: 60 MMHG | WEIGHT: 125 LBS | HEIGHT: 63 IN | OXYGEN SATURATION: 100 % | HEART RATE: 70 BPM | BODY MASS INDEX: 22.15 KG/M2 | TEMPERATURE: 98.2 F | SYSTOLIC BLOOD PRESSURE: 80 MMHG

## 2020-09-17 DIAGNOSIS — R42 DIZZINESS: ICD-10-CM

## 2020-09-17 DIAGNOSIS — G43.119 INTRACTABLE MIGRAINE WITH AURA WITHOUT STATUS MIGRAINOSUS: ICD-10-CM

## 2020-09-17 DIAGNOSIS — G44.229 CHRONIC TENSION-TYPE HEADACHE, NOT INTRACTABLE: Primary | ICD-10-CM

## 2020-09-17 PROCEDURE — 99214 OFFICE O/P EST MOD 30 MIN: CPT | Performed by: PSYCHIATRY & NEUROLOGY

## 2020-09-17 NOTE — PROGRESS NOTES
Subjective:    CC: Regla West is in clinic today for follow up for tension type headaches, migraines.    HPI:    Initial visit: 4/23/2020: Patient is a 34-year-old female with past medical history of hypothyroidism and recently diagnosed autoimmune thyroiditis/Hashimoto's thyroiditis referred to the clinic for evaluation for headaches and dizziness.  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  She reports history of infrequent episodic migraines in the past however since December, she has been having dull bifrontal headache ranging from 3-5 out of 10 intensity headache.  She reports that most days in a week, she would have dull bifrontal headache.  She denies any associated light or sound sensitivity or nausea or vomiting with this headache.  It usually stays in bifrontal region without any radiation of pain.  In addition, she is having dizzy spells on and off since October which became more intense and frequent in December.  She reports that for couple of months they were pretty bad exacerbated by sudden movements and lately in the last 4 to 5 weeks, they seem to be less intense.  She reports that more than the headache, dizziness is of concern to her.  She also reports increased amount of anxiety in last few weeks because by managing home and work due to ongoing coronavirus pandemic.  She was on Celexa to help with anxiety for last several months but it was not helping so it was recently changed to Lexapro 10 mg 2 weeks ago.  She reports that it has helped somewhat better than Celexa but still she has episodes of increased anxiety.  She has a detailed autoimmune work-up done after she was found to have a positive ALEKSANDER and has had consultation with rheumatology.  No definitive autoimmune pathology was found on their work-up except for autoimmune thyroiditis for which she is on Synthroid.  She also follows with endocrinology regularly for management of autoimmune thyroiditis with latest TSH  levels within the range.  Currently, she takes Tylenol or ibuprofen as needed at least 4-5 times in a week.  It does help with headache somewhat.  In the past, she was on amitriptyline for migraine prevention and it did help her.  This was tried 5 years ago.  She underwent MRI brain without contrast back in year 2018 because of complaint of headaches and dizziness which I reviewed personally and it did not reveal any intracranial abnormalities.  She reports that her sleep is not good and on average she gets 3 to 4 hours of sleep at night.    7/15/2020: She is in clinic for regular follow-up.  Since her last visit, she has been taking trazodone 25 mg at bedtime along with melatonin and for the most nights, she is now sleeping better and getting more hours of sleep at night than before.  She reports that Lexapro 10 mg is helping in keeping anxiety under control.  Currently, she is getting on an average 1-2 mild 3-4 out of 10 intensity headache starting in the neck area and then radiating up to the top of the head.  She does report chronic neck area muscle tension and also tension involving the parascapular muscles.  She has tried Flexeril in the past but it made her really sleepy so currently she is not taking it.  She takes ibuprofen as needed for mild to moderate intensity headaches and it seems to be working.  For intense migraines, she takes Maxalt and it seems to be helping as well.    7/24/2020: Trigger point injections done.    9/17/2020: She is in clinic for regular follow-up.  Since her last visit in July, she reports that her trigger point injections have helped her really well in keeping the frequent dull mild to moderate intensity tension type headaches under control.  She reports that migraines remain under good control with use of trazodone 25mg nightly.  She had to take Maxalt only once or twice since her last visit.  Overall she is doing much better.  Trazodone is helping her sleep better at night as  well.  She reports diagnosis of ulnar neuropathy on the right and sometimes she has noted that her index finger and thumb would twitch or tremor.  She has also noted slightly weak  strength on the right.    The following portions of the patient's history were reviewed and updated as of 2020: allergies, social history and problem list.       Current Outpatient Medications:   •  escitalopram (Lexapro) 10 MG tablet, Take 1 tablet by mouth Daily., Disp: 30 tablet, Rfl: 11  •  levothyroxine (SYNTHROID, LEVOTHROID) 88 MCG tablet, Take 1 tablet by mouth Daily., Disp: 90 tablet, Rfl: 3  •  loratadine (CLARITIN) 10 MG tablet, Take 10 mg by mouth Daily., Disp: , Rfl:   •  melatonin 5 MG tablet tablet, Take 5 mg by mouth Every Night., Disp: , Rfl:   •  minocycline (MINOCIN,DYNACIN) 50 MG capsule, Take 1 capsule by mouth 2 (two) times a day., Disp: 60 capsule, Rfl: 1  •  Multiple Vitamins-Minerals (HAIR SKIN AND NAILS FORMULA PO), Take  by mouth., Disp: , Rfl:   •  rizatriptan (MAXALT) 10 MG tablet, Take 1 tablet by mouth 1 (One) Time As Needed for Migraine for up to 1 dose. May repeat in 2 hours if needed, Disp: 9 tablet, Rfl: 1  •  Tranexamic Acid 650 MG tablet, Take 2 tablets by mouth 3 (Three) Times a Day for 5 days during menses, Disp: 30 tablet, Rfl: 11  •  traZODone (DESYREL) 50 MG tablet, Take 1 tablet by mouth Every Night., Disp: 30 tablet, Rfl: 1  •  tretinoin (Retin-A) 0.05 % cream, Apply topically to the appropriate area as directed Every Night., Disp: 20 g, Rfl: 2   Past Medical History:   Diagnosis Date   • Anemia     Taking iron   • Disease of thyroid gland     Takes synthroid   • Headache    • Migraine    • Pain in joint, lower leg       Past Surgical History:   Procedure Laterality Date   •  SECTION WITH TUBAL Bilateral 7/3/2018    Procedure:  SECTION PRIMARY WITH TUBAL * 39 WKS *;  Surgeon: Cinda Greenberg MD;  Location: [facility] LABOR DELIVERY;  Service: Obstetrics/Gynecology   •  "SEPTOPLASTY      2017   • WISDOM TOOTH EXTRACTION        Family History   Problem Relation Age of Onset   • Hyperlipidemia Mother    • Hypertension Father    • Hyperlipidemia Father    • Thyroid disease Father    • Breast cancer Neg Hx    • Ovarian cancer Neg Hx         Review of Systems  Objective:    BP (!) 80/60   Pulse 70   Temp 98.2 °F (36.8 °C)   Ht 160 cm (62.99\")   Wt 56.7 kg (125 lb)   SpO2 100%   BMI 22.15 kg/m²     Neurology Exam:  General apperance: NAD.     Mental status: Alert, awake and oriented to time place and person.    Recent and Remote memory: Can recall 3/3 objects at 5 minutes. Can recall historical events.     Attention span and Concentration: Serial 7s: Normal.     Fund of knowledge:  Normal.     Language and Speech: No aphasia or dysarthria.    Naming , Repitition and Comprehension:  Can name objects, repeat a sentence and follow commands. Speech is clear and fluent with good repetition, comprehension, and naming.    CN II to XII: Intact.    Opthalmoscopic Exam: No papilledema.    Motor:  Right UE muscle strength 5/5. Normal tone.     Left UE muscle strength 5/5. Normal tone.      Right LE muscle strength5/5. Normal tone.     Left LE muscle strength 5/5. Normal tone.      Sensory: Normal light touch, vibration and pinprick sensation bilaterally.    DTRs: 2+ bilaterally.    Babinski: Negative bilaterally.    Co-ordination: Normal finger-to-nose, heel to shin B/L.    Rhomberg: Negative.    Gait: Normal.    Cardiovascular: Regular rate and rhythm without murmur, gallop or rub.    Assessment and Plan:  1. Chronic tension-type headache, not intractable  2. Intractable migraine with aura without status migrainosus  -She has responded well to trigger point injection and frequent dull tension type of headaches have improved significantly.  Migraines remain under good control.  Trazodone has helped her sleep better.  Continue trazodone 25 mg nightly, continue with Maxalt 10 mg as needed.  She " has diagnosis of ulnar neuropathy on the right and have advised her to start wearing elbow brace to stop progression.  She is reporting intermittent tremoring of right thumb and index finger however ulnar neuropathy will not cause this.  I will see her back in 6 months for follow-up.    I spent 25 minutes face to face with the patient and spent more than 50% of this time  in management, instructions and education regarding above mentioned diagnosis and also on counseling and discussing about taking medication regularly, possible side effects with medication use, importance of good sleep hygiene, good hydration and regular exercise.    Return in about 6 months (around 3/17/2021).

## 2020-09-17 NOTE — PROGRESS NOTES
"Subjective:    CC: Regla West is in clinic today for follow up for      HPI:  ***    The following portions of the patient's history were reviewed and updated as of 2020: {history reviewed:83126::\"allergies\",\"social history\",\"problem list\"}.       Current Outpatient Medications:   •  escitalopram (Lexapro) 10 MG tablet, Take 1 tablet by mouth Daily., Disp: 30 tablet, Rfl: 11  •  levothyroxine (SYNTHROID, LEVOTHROID) 88 MCG tablet, Take 1 tablet by mouth Daily., Disp: 90 tablet, Rfl: 3  •  loratadine (CLARITIN) 10 MG tablet, Take 10 mg by mouth Daily., Disp: , Rfl:   •  melatonin 5 MG tablet tablet, Take 5 mg by mouth Every Night., Disp: , Rfl:   •  minocycline (MINOCIN,DYNACIN) 50 MG capsule, Take 1 capsule by mouth 2 (two) times a day., Disp: 60 capsule, Rfl: 1  •  Multiple Vitamins-Minerals (HAIR SKIN AND NAILS FORMULA PO), Take  by mouth., Disp: , Rfl:   •  rizatriptan (MAXALT) 10 MG tablet, Take 1 tablet by mouth 1 (One) Time As Needed for Migraine for up to 1 dose. May repeat in 2 hours if needed, Disp: 9 tablet, Rfl: 1  •  Tranexamic Acid 650 MG tablet, Take 2 tablets by mouth 3 (Three) Times a Day for 5 days during menses, Disp: 30 tablet, Rfl: 11  •  traZODone (DESYREL) 50 MG tablet, Take 1 tablet by mouth Every Night., Disp: 30 tablet, Rfl: 1  •  tretinoin (Retin-A) 0.05 % cream, Apply topically to the appropriate area as directed Every Night., Disp: 20 g, Rfl: 2   Past Medical History:   Diagnosis Date   • Anemia     Taking iron   • Disease of thyroid gland     Takes synthroid   • Headache    • Migraine    • Pain in joint, lower leg       Past Surgical History:   Procedure Laterality Date   •  SECTION WITH TUBAL Bilateral 7/3/2018    Procedure:  SECTION PRIMARY WITH TUBAL * 39 WKS *;  Surgeon: Cinda Greenberg MD;  Location: [facility] LABOR DELIVERY;  Service: Obstetrics/Gynecology   • SEPTOPLASTY      2017   • WISDOM TOOTH EXTRACTION        Family History   Problem Relation Age of " "Onset   • Hyperlipidemia Mother    • Hypertension Father    • Hyperlipidemia Father    • Thyroid disease Father    • Breast cancer Neg Hx    • Ovarian cancer Neg Hx         Review of Systems   Constitutional: Negative.    HENT: Positive for postnasal drip.    Eyes: Negative.    Respiratory: Negative.    Cardiovascular: Negative.    Gastrointestinal: Negative.    Endocrine: Negative.    Genitourinary: Negative.    Musculoskeletal: Positive for myalgias.   Allergic/Immunologic: Negative.    Neurological: Positive for tremors and headache.   Hematological: Negative.    Psychiatric/Behavioral: Negative.      Objective:    BP (!) 80/60   Pulse 70   Temp 98.2 °F (36.8 °C)   Ht 160 cm (62.99\")   Wt 56.7 kg (125 lb)   SpO2 100%   BMI 22.15 kg/m²     Neurology Exam:  General apperance: NAD.     Mental status: Alert, awake and oriented to time place and person.    Recent and Remote memory: Can recall 3/3 objects at 5 minutes. Can recall historical events.     Attention span and Concentration: Serial 7s: Normal.     Fund of knowledge:  Normal.     Language and Speech: No aphasia or dysarthria.    Naming , Repitition and Comprehension:  Can name objects, repeat a sentence and follow commands. Speech is clear and fluent with good repetition, comprehension, and naming.    CN II to XII: Intact.    Opthalmoscopic Exam: No papilledema.    Motor:  Right UE muscle strength 5/5. Normal tone.     Left UE muscle strength 5/5. Normal tone.      Right LE muscle strength5/5. Normal tone.     Left LE muscle strength 5/5. Normal tone.      Sensory: Normal light touch, vibration and pinprick sensation bilaterally.    DTRs: 2+ bilaterally.    Babinski: Negative bilaterally.    Co-ordination: Normal finger-to-nose, heel to shin B/L.    Rhomberg: Negative.    Gait: Normal.    Cardiovascular: Regular rate and rhythm without murmur, gallop or rub.    Assessment and Plan:  1. Chronic tension-type headache, not intractable  ***       I spent 25 " minutes face to face with the patient and spent more than 50% of this time  in management, instructions and education regarding above mentioned diagnosis and also on counseling and discussing about taking medication regularly, possible side effects with medication use, importance of good sleep hygiene, good hydration and regular exercise.    Return in about 6 months (around 3/17/2021).

## 2020-10-04 PROCEDURE — U0003 INFECTIOUS AGENT DETECTION BY NUCLEIC ACID (DNA OR RNA); SEVERE ACUTE RESPIRATORY SYNDROME CORONAVIRUS 2 (SARS-COV-2) (CORONAVIRUS DISEASE [COVID-19]), AMPLIFIED PROBE TECHNIQUE, MAKING USE OF HIGH THROUGHPUT TECHNOLOGIES AS DESCRIBED BY CMS-2020-01-R: HCPCS | Performed by: NURSE PRACTITIONER

## 2020-10-08 ENCOUNTER — TELEPHONE (OUTPATIENT)
Dept: URGENT CARE | Facility: CLINIC | Age: 35
End: 2020-10-08

## 2020-10-08 NOTE — TELEPHONE ENCOUNTER
----- Message from Johnathan Nguyen MD sent at 10/8/2020  8:15 AM EDT -----  Please inform patient of negative test    ----- Message -----  From: Lab, Background User  Sent: 10/8/2020   3:36 AM EDT  To:  [facility] Covid Results

## 2020-11-23 ENCOUNTER — LAB (OUTPATIENT)
Dept: LAB | Facility: HOSPITAL | Age: 35
End: 2020-11-23

## 2020-11-23 DIAGNOSIS — E03.9 HYPOTHYROIDISM, UNSPECIFIED TYPE: Primary | ICD-10-CM

## 2020-11-23 DIAGNOSIS — E03.9 HYPOTHYROIDISM, UNSPECIFIED TYPE: ICD-10-CM

## 2020-11-23 LAB
ERYTHROCYTE [DISTWIDTH] IN BLOOD BY AUTOMATED COUNT: 14.4 % (ref 12.3–15.4)
HCT VFR BLD AUTO: 43 % (ref 34–46.6)
HGB BLD-MCNC: 14.1 G/DL (ref 12–15.9)
LYMPHOCYTES # BLD AUTO: 1.7 10*3/MM3 (ref 0.7–3.1)
LYMPHOCYTES NFR BLD AUTO: 35.3 % (ref 19.6–45.3)
MCH RBC QN AUTO: 30.6 PG (ref 26.6–33)
MCHC RBC AUTO-ENTMCNC: 32.8 G/DL (ref 31.5–35.7)
MCV RBC AUTO: 93.1 FL (ref 79–97)
MONOCYTES # BLD AUTO: 0.2 10*3/MM3 (ref 0.1–0.9)
MONOCYTES NFR BLD AUTO: 5 % (ref 5–12)
NEUTROPHILS NFR BLD AUTO: 2.8 10*3/MM3 (ref 1.7–7)
NEUTROPHILS NFR BLD AUTO: 59.7 % (ref 42.7–76)
PLATELET # BLD AUTO: 199 10*3/MM3 (ref 140–450)
PMV BLD AUTO: 7.2 FL (ref 6–12)
RBC # BLD AUTO: 4.62 10*6/MM3 (ref 3.77–5.28)
T4 FREE SERPL-MCNC: 1.11 NG/DL (ref 0.93–1.7)
TSH SERPL DL<=0.05 MIU/L-ACNC: 1.94 UIU/ML (ref 0.27–4.2)
WBC # BLD AUTO: 4.7 10*3/MM3 (ref 3.4–10.8)

## 2020-11-23 PROCEDURE — 85025 COMPLETE CBC W/AUTO DIFF WBC: CPT

## 2020-11-23 PROCEDURE — 86038 ANTINUCLEAR ANTIBODIES: CPT

## 2020-11-23 PROCEDURE — 84443 ASSAY THYROID STIM HORMONE: CPT

## 2020-11-23 PROCEDURE — 36415 COLL VENOUS BLD VENIPUNCTURE: CPT

## 2020-11-23 PROCEDURE — 84439 ASSAY OF FREE THYROXINE: CPT

## 2020-11-25 LAB — ANA TITR SER IF: NEGATIVE {TITER}

## 2020-12-10 ENCOUNTER — DOCUMENTATION (OUTPATIENT)
Dept: FAMILY MEDICINE CLINIC | Facility: CLINIC | Age: 35
End: 2020-12-10

## 2020-12-10 ENCOUNTER — OFFICE VISIT (OUTPATIENT)
Dept: ENDOCRINOLOGY | Facility: CLINIC | Age: 35
End: 2020-12-10

## 2020-12-10 VITALS
SYSTOLIC BLOOD PRESSURE: 99 MMHG | WEIGHT: 126 LBS | DIASTOLIC BLOOD PRESSURE: 74 MMHG | TEMPERATURE: 97.3 F | HEART RATE: 79 BPM | HEIGHT: 63 IN | BODY MASS INDEX: 22.32 KG/M2

## 2020-12-10 DIAGNOSIS — E03.9 ACQUIRED HYPOTHYROIDISM: Primary | ICD-10-CM

## 2020-12-10 DIAGNOSIS — E06.3 HASHIMOTO'S THYROIDITIS: ICD-10-CM

## 2020-12-10 PROCEDURE — 99213 OFFICE O/P EST LOW 20 MIN: CPT | Performed by: INTERNAL MEDICINE

## 2020-12-10 RX ORDER — METAXALONE 800 MG/1
800 TABLET ORAL 3 TIMES DAILY PRN
Qty: 30 TABLET | Refills: 3 | Status: SHIPPED | OUTPATIENT
Start: 2020-12-10 | End: 2021-06-09

## 2020-12-10 RX ORDER — LEVOTHYROXINE SODIUM 88 UG/1
88 TABLET ORAL DAILY
Qty: 90 TABLET | Refills: 3 | Status: SHIPPED | OUTPATIENT
Start: 2020-12-10 | End: 2021-06-10 | Stop reason: SDUPTHER

## 2020-12-10 NOTE — PROGRESS NOTES
"     Office Note      Date: 12/10/2020  Patient Name: Regla West  MRN: [id number]  : 1985    Chief Complaint   Patient presents with   • Follow-up   • Thyroid Problem       History of Present Illness:   Regla West is a 34 y.o. female who presents for Follow-up and Thyroid Problem    She remains on T4 88mcg qd. She is taking this correctly. She isn't taking any interfering  meds concurrently. She hasn't noted any change in the size of her neck. She denies any  compressive sxs. She denies any sxs of hypo- or hyperthyroidism at this time.     She had labs done recently.  TSH was 1.94.  Free T4 was normal.    Subjective      Review of Systems:   Review of Systems   Constitutional: Negative.    Cardiovascular: Negative.    Gastrointestinal: Negative.    Endocrine: Negative.        The following portions of the patient's history were reviewed and updated as appropriate: allergies, current medications, past family history, past medical history, past social history, past surgical history and problem list.    Objective     Visit Vitals  BP 99/74   Pulse 79   Temp 97.3 °F (36.3 °C) (Infrared)   Ht 160 cm (63\")   Wt 57.2 kg (126 lb)   BMI 22.32 kg/m²       Physical Exam:  Physical Exam  Constitutional:       Appearance: Normal appearance.   Neck:      Thyroid: No thyroid mass, thyromegaly or thyroid tenderness.   Lymphadenopathy:      Cervical: No cervical adenopathy.   Neurological:      Mental Status: She is alert.         Labs:    TSH  No results found for: TSHBASE     Free T4  Free T4   Date Value Ref Range Status   2020 1.11 0.93 - 1.70 ng/dL Final       T3  No results found for: [id number]      TPO  Thyroid Peroxidase Antibody   Date Value Ref Range Status   2020 228 (H) 0 - 34 IU/mL Final       TG AB  Thyroglobulin Ab   Date Value Ref Range Status   2020 4.6 (H) 0.0 - 0.9 IU/mL Final     Comment:     Thyroglobulin Antibody measured by [employer]       TG  No results found " for: THYROGLB    CBC w/DIFF  Lab Results   Component Value Date    WBC 4.70 11/23/2020    RBC 4.62 11/23/2020    HGB 14.1 11/23/2020    HCT 43.0 11/23/2020    MCV 93.1 11/23/2020    MCH 30.6 11/23/2020    MCHC 32.8 11/23/2020    RDW 14.4 11/23/2020    RDWSD 44.4 11/16/2018    MPV 7.2 11/23/2020     11/23/2020    NEUTRORELPCT 59.7 11/23/2020    LYMPHORELPCT 35.3 11/23/2020    MONORELPCT 5.0 11/23/2020    EOSRELPCT 5.9 (H) 11/16/2018    BASORELPCT 0.4 11/16/2018    AUTOIGPER 0.2 11/16/2018    NEUTROABS 2.80 11/23/2020    LYMPHSABS 1.70 11/23/2020    MONOSABS 0.20 11/23/2020    EOSABS 0.32 (H) 11/16/2018    BASOSABS 0.02 11/16/2018    AUTOIGNUM 0.01 11/16/2018           Assessment / Plan      Assessment & Plan:  Problem List Items Addressed This Visit        Endocrine    Hypothyroid - Primary    Current Assessment & Plan     TSH at goal.  Continue current tx.         Relevant Medications    levothyroxine (SYNTHROID, LEVOTHROID) 88 MCG tablet    Other Relevant Orders    TSH    T4, Free    Hashimoto's thyroiditis    Current Assessment & Plan     No goiter on exam.         Relevant Medications    levothyroxine (SYNTHROID, LEVOTHROID) 88 MCG tablet           Return in about 6 months (around 6/10/2021) for Recheck.    Johnny Jones MD   12/10/2020

## 2020-12-11 ENCOUNTER — CLINICAL SUPPORT (OUTPATIENT)
Dept: NEUROLOGY | Facility: CLINIC | Age: 35
End: 2020-12-11

## 2020-12-11 VITALS
WEIGHT: 131 LBS | TEMPERATURE: 97.7 F | DIASTOLIC BLOOD PRESSURE: 72 MMHG | SYSTOLIC BLOOD PRESSURE: 112 MMHG | OXYGEN SATURATION: 100 % | HEART RATE: 81 BPM | HEIGHT: 63 IN | BODY MASS INDEX: 23.21 KG/M2

## 2020-12-11 DIAGNOSIS — G44.229 CHRONIC TENSION-TYPE HEADACHE, NOT INTRACTABLE: Primary | ICD-10-CM

## 2020-12-11 DIAGNOSIS — G43.119 INTRACTABLE MIGRAINE WITH AURA WITHOUT STATUS MIGRAINOSUS: ICD-10-CM

## 2020-12-11 PROCEDURE — 20553 NJX 1/MLT TRIGGER POINTS 3/>: CPT | Performed by: PSYCHIATRY & NEUROLOGY

## 2020-12-11 NOTE — PROGRESS NOTES
CC: Occipital headaches and tension type headaches.    Procedure Note:  1.         Patient was positioned comfortably  2.         Before injections are started, 10 Trigger Points sites are identified throughout the bilateral upper trapezius muscle, levator scapulae, and erector spinae muscles.    3.         Overlying skin area was cleaned with Alcohol swab                                                                                                              4.         Before injection, trigger points sites were palpated for local twitch and referred pain to confirms placement                         5.         Local anesthetic bupivacaine 5 mg/mL-total of 10 cc was used.  6.         30 gauge ½ inch needle was utilized to ensure patient comfort          7.         I started with the most tender spot in above mentioned Trigger Points   1.   Localize most tender spot within taut muscle-fibers  2.   Fix tender spot between fingers (1-2 cm in size)   1.   Prevents from rolling away from needle  2.   Controls subcutaneous bleeding  3.   Before injection, patient was instructed of possible pain on injection  4.   Direct needle at 30 degree angle off skin   8.         Insert needle into skin 1-2 cm from Trigger Point   9.         Advance needle into Trigger Point   10.       Use 1cc anesthetic at each Trigger Points identified in step #2  11.       Redirect needle and reinject                                                                                              1.   Withdraw needle to subcutaneous tissue  2.   Redirect needle into adjacent tender areas  3.   Repeat until local twitch or tautness resolves  12.   After each of the 10 injections I held direct pressure at injection site for 2 minutes to prevents hematoma formation  13.   The same procedure was repeated for other Tender Points located in the upper trapezius muscle, levator scapulae and erector spinae bilaterally  14.   Patient was instructed to  gently stretch injected areas to full active range of motion in all directions and was instructed to repeat range of motion three times after injection  15.   Post-Procedure patient was instructed to avoid over-using injected area for 3-4 days   1.   Maintain active range of motion of injected muscle  2.   Patient applies ice to injected areas for a few hours  3.   Anticipate post-injection soreness for 3-4 days  There was no evidence of complications from injection was noted such as local Skin Infection  at injection site or local hematoma at injection site    Note: She had to schedule her appointment earlier as for the last 2 weeks, she started experiencing worsening in headaches.  Last trigger point injection was done 3 months ago.  She is interested in continuing trigger point injection.  I will schedule her for trigger point injection every 12 weeks to keep headaches under control.

## 2020-12-21 RX ORDER — TRAZODONE HYDROCHLORIDE 50 MG/1
50 TABLET ORAL NIGHTLY
Qty: 30 TABLET | Refills: 1 | Status: SHIPPED | OUTPATIENT
Start: 2020-12-21 | End: 2021-04-21 | Stop reason: SDUPTHER

## 2020-12-22 ENCOUNTER — IMMUNIZATION (OUTPATIENT)
Dept: VACCINE CLINIC | Facility: HOSPITAL | Age: 35
End: 2020-12-22

## 2020-12-22 PROCEDURE — 0001A: CPT | Performed by: INTERNAL MEDICINE

## 2020-12-22 PROCEDURE — 91300 HC SARSCOV02 VAC 30MCG/0.3ML IM: CPT | Performed by: INTERNAL MEDICINE

## 2021-01-12 ENCOUNTER — IMMUNIZATION (OUTPATIENT)
Dept: VACCINE CLINIC | Facility: HOSPITAL | Age: 36
End: 2021-01-12

## 2021-01-12 PROCEDURE — 91300 HC SARSCOV02 VAC 30MCG/0.3ML IM: CPT | Performed by: INTERNAL MEDICINE

## 2021-01-12 PROCEDURE — 0001A: CPT | Performed by: INTERNAL MEDICINE

## 2021-01-12 PROCEDURE — 0002A: CPT | Performed by: INTERNAL MEDICINE

## 2021-01-14 ENCOUNTER — OFFICE VISIT (OUTPATIENT)
Dept: OBSTETRICS AND GYNECOLOGY | Facility: CLINIC | Age: 36
End: 2021-01-14

## 2021-01-14 VITALS
DIASTOLIC BLOOD PRESSURE: 80 MMHG | WEIGHT: 130 LBS | HEIGHT: 63 IN | BODY MASS INDEX: 23.04 KG/M2 | SYSTOLIC BLOOD PRESSURE: 106 MMHG

## 2021-01-14 DIAGNOSIS — N92.0 MENORRHAGIA WITH REGULAR CYCLE: Primary | ICD-10-CM

## 2021-01-14 DIAGNOSIS — N83.201 CYST OF RIGHT OVARY: ICD-10-CM

## 2021-01-14 PROCEDURE — 99212 OFFICE O/P EST SF 10 MIN: CPT | Performed by: OBSTETRICS & GYNECOLOGY

## 2021-01-14 NOTE — PROGRESS NOTES
Chief Complaint   Patient presents with   • Follow-up     ovarian cyst       Subjective   HPI  Regla West is a 35 y.o. female, , who presents for follow up ultrasound for menorrhagia and ovarian cysts. She had her annual in August. EMB  Neg. She is considering Mirena.   She is due for 2nd Gardasil vaccine but will come back for it. She received her Covid vaccine this week.       Patient's last menstrual period was 2021..  Periods are q28 days, lasting 7-8 days.  Dysmenorrhea:mild, occurring first 1-2 days of flow.  Patient reports problems with: heavy flow with clots.  Partner Status: Marital Status: .  New Partners since last visit: no.  Desires STD Screening: no.    Additional OB/GYN History   Current contraception: contraceptive methods: Tubal ligation  Desires to: continue contraception  Last Pap :   Last Completed Pap Smear       Status Date      PAP SMEAR Done 2020 neg, neg HPV        History of abnormal Pap smear: no  Last mammogram: 20  Last Completed Mammogram     Patient has no health maintenance due at this time        Tobacco Usage?: No   OB History        2    Para   2    Term   2            AB        Living   2       SAB        TAB        Ectopic        Molar        Multiple   0    Live Births   2                Health Maintenance   Topic Date Due   • Annual Gynecologic Pelvic and Breast Exam  1983   • ANNUAL PHYSICAL  1986   • Pneumococcal Vaccine 0-64 (1 of 1 - PPSV23) 1989   • TDAP/TD VACCINES (1 - Tdap) 2002   • HEPATITIS C SCREENING  2016   • LIPID PANEL  2020   • PAP SMEAR  2023   • INFLUENZA VACCINE  Completed   • MENINGOCOCCAL VACCINE  Aged Out       The additional following portions of the patient's history were reviewed and updated as appropriate: allergies, current medications, past family history, past medical history, past social history, past surgical history and problem list.    Review of Systems  "  Constitutional: Negative.    HENT: Negative.    Respiratory: Negative.    Cardiovascular: Negative.    Gastrointestinal: Negative.    Genitourinary: Positive for menstrual problem (menorrhagia).   Musculoskeletal: Negative.    Skin: Negative.    Allergic/Immunologic: Negative.    Neurological: Negative.    Hematological: Negative.    Psychiatric/Behavioral: Negative.        I have reviewed and agree with the HPI, ROS, and historical information as entered above. Genoveva Sanders MD    Objective   /80   Ht 160 cm (63\")   Wt 59 kg (130 lb)   LMP 01/07/2021   BMI 23.03 kg/m²     Physical Exam  Constitutional:       Appearance: She is well-developed.   HENT:      Head: Normocephalic.   Eyes:      Conjunctiva/sclera: Conjunctivae normal.   Pulmonary:      Effort: Pulmonary effort is normal.   Psychiatric:         Behavior: Behavior normal.         Assessment/Plan         Problem List Items Addressed This Visit     None      Visit Diagnoses     Menorrhagia with regular cycle    -  Primary    Cyst of right ovary - resolved                Plan       1.  Previous septated cyst has resolved.  Reviewed u/s.  2.  Menorrhagia - the Lysteda did not work well.  Discussed options and will try a Mirena IUD.  Novasure if fails Mirena.  3.  Will return for HPV vaccine as recently had the Covid vaccine        Genoveva Sanders MD  01/14/2021  "

## 2021-01-27 ENCOUNTER — LAB (OUTPATIENT)
Dept: LAB | Facility: HOSPITAL | Age: 36
End: 2021-01-27

## 2021-01-27 DIAGNOSIS — R30.0 DYSURIA: Primary | ICD-10-CM

## 2021-01-27 DIAGNOSIS — R30.0 DYSURIA: ICD-10-CM

## 2021-01-27 LAB
BILIRUB UR QL STRIP: NEGATIVE
CLARITY UR: CLEAR
COLOR UR: YELLOW
GLUCOSE UR STRIP-MCNC: NEGATIVE MG/DL
HGB UR QL STRIP.AUTO: NEGATIVE
KETONES UR QL STRIP: ABNORMAL
LEUKOCYTE ESTERASE UR QL STRIP.AUTO: NEGATIVE
NITRITE UR QL STRIP: NEGATIVE
PH UR STRIP.AUTO: <=5 [PH] (ref 5–8)
PROT UR QL STRIP: NEGATIVE
SP GR UR STRIP: 1.02 (ref 1–1.03)
UROBILINOGEN UR QL STRIP: ABNORMAL

## 2021-01-27 PROCEDURE — 81003 URINALYSIS AUTO W/O SCOPE: CPT

## 2021-01-27 NOTE — PROGRESS NOTES
Patient having dysuria with urgency and frequency. She is afebrile. Completed a round of antibiotics 1/25/2021 for UTI but still has ongoing symptoms.

## 2021-02-05 ENCOUNTER — OFFICE VISIT (OUTPATIENT)
Dept: OBSTETRICS AND GYNECOLOGY | Facility: CLINIC | Age: 36
End: 2021-02-05

## 2021-02-05 VITALS
WEIGHT: 131 LBS | SYSTOLIC BLOOD PRESSURE: 102 MMHG | BODY MASS INDEX: 23.21 KG/M2 | DIASTOLIC BLOOD PRESSURE: 60 MMHG | HEIGHT: 63 IN

## 2021-02-05 DIAGNOSIS — Z30.430 ENCOUNTER FOR IUD INSERTION: Primary | ICD-10-CM

## 2021-02-05 DIAGNOSIS — Z30.431 IUD CHECK UP: ICD-10-CM

## 2021-02-05 LAB
B-HCG UR QL: NEGATIVE
INTERNAL NEGATIVE CONTROL: NEGATIVE
INTERNAL POSITIVE CONTROL: POSITIVE
Lab: NORMAL

## 2021-02-05 PROCEDURE — 81025 URINE PREGNANCY TEST: CPT | Performed by: NURSE PRACTITIONER

## 2021-02-05 PROCEDURE — 58300 INSERT INTRAUTERINE DEVICE: CPT | Performed by: NURSE PRACTITIONER

## 2021-02-05 NOTE — PROGRESS NOTES
Procedure: IUD Insertion Procedure Note     Procedures    Pre procedure indication 1) Desires Mirena  Post procedure indication 1) Desires Mirena  NDC #: 86391-410-18  Lot #: [id number]  Exp Date:6/2023  BH device    The risks, benefits, and alternatives to Mirena were explained at length with the patient. All her questions were answered and consents were signed.    The patient was placed in a dorsal lithotomy position on the examining table in [facility]. A bimanual exam confirmed the uterus was normal in size, anterior. A warmed metal speculum was inserted into the vagina and the cervix was brought into view.    The cervix was prepped with Betadine. The anterior lip was grasped with a single-tooth tenaculum. The endometrial cavity was then sounded to 7-8cm without use of a dilator. This sealed Mirena package was opened and the IUD was removed in a sterile fashion.    The upper edge of the depth setting the flange was set at a uterine sound measured. The  was then carefully advanced to the cervical canal into the uterus to the level of the fundus.  The slider was then retracted about 1 cm and deployed the device. The device was then gently advanced to the fundus. The IUD was then released by pulling the slider down all the way. The  was removed carefully from the uterus. The threads were then cut leaving 2-3 cm visible outside of the cervix.  The single-tooth tenaculum was removed from the anterior lip. Good hemostasis was noted.     All other instruments were removed from the vagina.   There were no complications.  The patient tolerated the procedure well with a minimal amount of discomfort.    The patient was counseled about the need to return in 4 weeks with U/S for IUD check.     She was counseled about the need to use a backup method of contraception such as condoms until her post insertion exam was performed. The patient verbalized understanding that the Mirena will need to be removed/replaced  after 5 years. The patient is counseled to contact us if she has any significant or increasing bleeding, pain, fever, chills, or other concerns. She is instructed to see a doctor right away if she believes that she may be pregnant at any time with the IUD in place.    Rose Romero MA  02/05/2021

## 2021-03-04 ENCOUNTER — OFFICE VISIT (OUTPATIENT)
Dept: OBSTETRICS AND GYNECOLOGY | Facility: CLINIC | Age: 36
End: 2021-03-04

## 2021-03-04 VITALS — SYSTOLIC BLOOD PRESSURE: 98 MMHG | WEIGHT: 129 LBS | DIASTOLIC BLOOD PRESSURE: 62 MMHG | BODY MASS INDEX: 22.85 KG/M2

## 2021-03-04 DIAGNOSIS — Z30.431 IUD CHECK UP: Primary | ICD-10-CM

## 2021-03-04 PROCEDURE — 99212 OFFICE O/P EST SF 10 MIN: CPT | Performed by: NURSE PRACTITIONER

## 2021-03-04 NOTE — PROGRESS NOTES
Chief Complaint   Patient presents with   • Follow-up     IUD follow up         Subjective   HPI  Regla West is a 35 y.o. female, , who presents for IUD check follow up.  She had an IUD placed 1 month ago.  Since the IUD placement, the patient reports spotting and mild cramping since insertion, and bloating.    The additional following portions of the patient's history were reviewed and updated as appropriate: allergies, current medications, past family history, past medical history, past social history, past surgical history and problem list.    Did the patient have u/s today? Yes.  Findings showed IUD with correct placement.  I have personally evaluated the U/S and agree with the findings. Rere Dejesus, APRN    Review of Systems   Constitutional: Negative for fatigue and fever.   Respiratory: Negative for cough and shortness of breath.    Cardiovascular: Negative for chest pain and leg swelling.   Gastrointestinal: Negative.    Genitourinary: Negative.    Musculoskeletal: Negative for back pain and gait problem.   Neurological: Negative for dizziness and headache.   Psychiatric/Behavioral: Negative for depressed mood. The patient is not nervous/anxious.      All other systems reviewed and are negative.     I have reviewed and agree with the HPI, ROS, and historical information as entered above. Rere Dejesus, APRN    Objective   BP 98/62   Wt 58.5 kg (129 lb)   LMP 2021 (LMP Unknown)   Breastfeeding No   BMI 22.85 kg/m²     Physical Exam  Vitals signs and nursing note reviewed.   Constitutional:       Appearance: Normal appearance.   Pulmonary:      Effort: Pulmonary effort is normal.   Abdominal:      General: There is no distension.      Palpations: Abdomen is soft. There is no mass.      Tenderness: There is no abdominal tenderness. There is no guarding or rebound.      Hernia: No hernia is present.   Skin:     General: Skin is warm and dry.   Neurological:      Mental Status: She is alert  and oriented to person, place, and time.   Psychiatric:         Mood and Affect: Mood normal.         Behavior: Behavior normal.         Assessment/Plan     Assessment     Problem List Items Addressed This Visit     None      Visit Diagnoses     IUD check up    -  Primary          Plan     D/w pt US shows IUD in correct position.  Call prn changes in strings, pain, bleeding.  RTO for annual and prn.    Rere Dejesus, APRN  03/04/2021

## 2021-03-18 ENCOUNTER — CLINICAL SUPPORT (OUTPATIENT)
Dept: NEUROLOGY | Facility: CLINIC | Age: 36
End: 2021-03-18

## 2021-03-18 VITALS
DIASTOLIC BLOOD PRESSURE: 64 MMHG | SYSTOLIC BLOOD PRESSURE: 112 MMHG | WEIGHT: 125 LBS | BODY MASS INDEX: 22.15 KG/M2 | OXYGEN SATURATION: 98 % | HEIGHT: 63 IN | TEMPERATURE: 98.2 F | HEART RATE: 82 BPM

## 2021-03-18 DIAGNOSIS — G44.229 CHRONIC TENSION-TYPE HEADACHE, NOT INTRACTABLE: Primary | ICD-10-CM

## 2021-03-18 DIAGNOSIS — G43.119 INTRACTABLE MIGRAINE WITH AURA WITHOUT STATUS MIGRAINOSUS: ICD-10-CM

## 2021-03-18 PROCEDURE — 20553 NJX 1/MLT TRIGGER POINTS 3/>: CPT | Performed by: PSYCHIATRY & NEUROLOGY

## 2021-03-18 RX ORDER — RIMEGEPANT SULFATE 75 MG/75MG
75 TABLET, ORALLY DISINTEGRATING ORAL DAILY PRN
Qty: 8 TABLET | Refills: 3 | Status: SHIPPED | OUTPATIENT
Start: 2021-03-18 | End: 2023-03-24

## 2021-03-18 NOTE — PROGRESS NOTES
CC: Occipital headaches, cervicogenic headaches and migraines.  Procedure Note:  1.         Patient was positioned comfortably  2.         Before injections are started, 10 Trigger Points sites are identified throughout the bilateral upper trapezius muscle, levator scapulae, and erector spinae muscles.    3.         Overlying skin area was cleaned with Alcohol swab                                                                                                              4.         Before injection, trigger points sites were palpated for local twitch and referred pain to confirms placement                         5.         Local anesthetic was mixed with Depo-Medrol (5 cc of Marcaine 0.5% mixed with 5 cc of Depo-Medrol at 40 mg/ml - for a total of 10 cc)  6.         30 gauge ½ inch needle was utilized to ensure patient comfort          7.         I started with the most tender spot in above mentioned Trigger Points   1.   Localize most tender spot within taut muscle-fibers  2.   Fix tender spot between fingers (1-2 cm in size)   1.   Prevents from rolling away from needle  2.   Controls subcutaneous bleeding  3.   Before injection, patient was instructed of possible pain on injection  4.   Direct needle at 30 degree angle off skin   8.         Insert needle into skin 1-2 cm from Trigger Point   9.         Advance needle into Trigger Point   10.       Use 1cc anesthetic at each Trigger Points identified in step #2  11.       Redirect needle and reinject                                                                                              1.   Withdraw needle to subcutaneous tissue  2.   Redirect needle into adjacent tender areas  3.   Repeat until local twitch or tautness resolves  12.   After each of the 10 injections I held direct pressure at injection site for 2 minutes to prevents hematoma formation  13.   The same procedure was repeated for other Tender Points located in the upper trapezius muscle,  levator scapulae and erector spinae bilaterally  14.   Patient was instructed to gently stretch injected areas to full active range of motion in all directions and was instructed to repeat range of motion three times after injection  15.   Post-Procedure patient was instructed to avoid over-using injected area for 3-4 days   1.   Maintain active range of motion of injected muscle  2.   Patient applies ice to injected areas for a few hours  3.   Anticipate post-injection soreness for 3-4 days  There was no evidence of complications from injection was noted such as local Skin Infection  at injection site or local hematoma at injection site    Note: She has tried Maxalt as an abortive treatment but is causing her to have side effects.  In the past, Imitrex caused her to have side effects as well.  I am going to prescribe her Nurtec to be taken as needed at the onset of migraines.  Overall migraines remain under excellent control.  She is tolerating trazodone 12.5 mg at bedtime which helps with the sleep and also with the migraines.  Trigger point injections are helping with cervicogenic and occipital headaches.  It will be continued at every 12 weeks.

## 2021-03-29 ENCOUNTER — LAB (OUTPATIENT)
Dept: LAB | Facility: HOSPITAL | Age: 36
End: 2021-03-29

## 2021-03-29 DIAGNOSIS — E03.9 ACQUIRED HYPOTHYROIDISM: ICD-10-CM

## 2021-03-29 DIAGNOSIS — E03.9 ACQUIRED HYPOTHYROIDISM: Primary | ICD-10-CM

## 2021-03-29 LAB
T4 FREE SERPL-MCNC: 1.39 NG/DL (ref 0.93–1.7)
TSH SERPL DL<=0.05 MIU/L-ACNC: 2.13 UIU/ML (ref 0.27–4.2)

## 2021-03-29 PROCEDURE — 84439 ASSAY OF FREE THYROXINE: CPT

## 2021-03-29 PROCEDURE — 36415 COLL VENOUS BLD VENIPUNCTURE: CPT

## 2021-03-29 PROCEDURE — 84443 ASSAY THYROID STIM HORMONE: CPT

## 2021-04-21 RX ORDER — TRAZODONE HYDROCHLORIDE 50 MG/1
50 TABLET ORAL NIGHTLY
Qty: 30 TABLET | Refills: 1 | Status: SHIPPED | OUTPATIENT
Start: 2021-04-21 | End: 2021-08-16 | Stop reason: SDUPTHER

## 2021-06-02 ENCOUNTER — LAB (OUTPATIENT)
Dept: LAB | Facility: HOSPITAL | Age: 36
End: 2021-06-02

## 2021-06-02 DIAGNOSIS — D64.9 ANEMIA, UNSPECIFIED TYPE: ICD-10-CM

## 2021-06-02 DIAGNOSIS — D64.9 ANEMIA, UNSPECIFIED TYPE: Primary | ICD-10-CM

## 2021-06-02 DIAGNOSIS — E03.9 ACQUIRED HYPOTHYROIDISM: ICD-10-CM

## 2021-06-02 LAB
FERRITIN SERPL-MCNC: 27.95 NG/ML (ref 13–150)
IRON 24H UR-MRATE: 86 MCG/DL (ref 37–145)
IRON SATN MFR SERPL: 21 % (ref 20–50)
T4 FREE SERPL-MCNC: 1.4 NG/DL (ref 0.93–1.7)
TIBC SERPL-MCNC: 417 MCG/DL (ref 298–536)
TRANSFERRIN SERPL-MCNC: 280 MG/DL (ref 200–360)
TSH SERPL DL<=0.05 MIU/L-ACNC: 2.55 UIU/ML (ref 0.27–4.2)

## 2021-06-02 PROCEDURE — 84439 ASSAY OF FREE THYROXINE: CPT

## 2021-06-02 PROCEDURE — 84466 ASSAY OF TRANSFERRIN: CPT

## 2021-06-02 PROCEDURE — 84443 ASSAY THYROID STIM HORMONE: CPT

## 2021-06-02 PROCEDURE — 36415 COLL VENOUS BLD VENIPUNCTURE: CPT

## 2021-06-02 PROCEDURE — 82728 ASSAY OF FERRITIN: CPT

## 2021-06-02 PROCEDURE — 83540 ASSAY OF IRON: CPT

## 2021-06-07 RX ORDER — LIDOCAINE AND PRILOCAINE 25; 25 MG/G; MG/G
CREAM TOPICAL AS NEEDED
Qty: 30 G | Refills: 1 | Status: SHIPPED | OUTPATIENT
Start: 2021-06-07 | End: 2021-09-01

## 2021-06-09 ENCOUNTER — CLINICAL SUPPORT (OUTPATIENT)
Dept: NEUROLOGY | Facility: CLINIC | Age: 36
End: 2021-06-09

## 2021-06-09 VITALS
TEMPERATURE: 96.9 F | BODY MASS INDEX: 22.15 KG/M2 | WEIGHT: 125 LBS | SYSTOLIC BLOOD PRESSURE: 114 MMHG | OXYGEN SATURATION: 98 % | HEART RATE: 83 BPM | HEIGHT: 63 IN | DIASTOLIC BLOOD PRESSURE: 62 MMHG

## 2021-06-09 DIAGNOSIS — G43.119 INTRACTABLE MIGRAINE WITH AURA WITHOUT STATUS MIGRAINOSUS: ICD-10-CM

## 2021-06-09 DIAGNOSIS — G44.229 CHRONIC TENSION-TYPE HEADACHE, NOT INTRACTABLE: Primary | ICD-10-CM

## 2021-06-09 PROCEDURE — 20553 NJX 1/MLT TRIGGER POINTS 3/>: CPT | Performed by: PSYCHIATRY & NEUROLOGY

## 2021-06-09 NOTE — PROGRESS NOTES
CC: Chronic tension headaches and Chronic Occipital headaches.    Procedure Note:  1.         Patient was positioned comfortably  2.         Before injections are started, 10 Trigger Points sites are identified throughout the bilateral upper trapezius muscle, levator scapulae, and erector spinae muscles.    3.         Overlying skin area was cleaned with Alcohol swab                                                                                                              4.         Before injection, trigger points sites were palpated for local twitch and referred pain to confirms placement                         5.         Local anesthetic was mixed with Depo-Medrol (5 cc of Marcaine 0.5% mixed with 5 cc of Depo-Medrol at 40 mg/ml - for a total of 10 cc)  6.         30 gauge ½ inch needle was utilized to ensure patient comfort          7.         I started with the most tender spot in above mentioned Trigger Points   1.   Localize most tender spot within taut muscle-fibers  2.   Fix tender spot between fingers (1-2 cm in size)   1.   Prevents from rolling away from needle  2.   Controls subcutaneous bleeding  3.   Before injection, patient was instructed of possible pain on injection  4.   Direct needle at 30 degree angle off skin   8.         Insert needle into skin 1-2 cm from Trigger Point   9.         Advance needle into Trigger Point   10.       Use 1cc anesthetic at each Trigger Points identified in step #2  11.       Redirect needle and reinject                                                                                              1.   Withdraw needle to subcutaneous tissue  2.   Redirect needle into adjacent tender areas  3.   Repeat until local twitch or tautness resolves  12.   After each of the 10 injections I held direct pressure at injection site for 2 minutes to prevents hematoma formation  13.   The same procedure was repeated for other Tender Points located in the upper trapezius muscle,  levator scapulae and erector spinae bilaterally  14.   Patient was instructed to gently stretch injected areas to full active range of motion in all directions and was instructed to repeat range of motion three times after injection  15.   Post-Procedure patient was instructed to avoid over-using injected area for 3-4 days   1.   Maintain active range of motion of injected muscle  2.   Patient applies ice to injected areas for a few hours  3.   Anticipate post-injection soreness for 3-4 days  There was no evidence of complications from injection was noted such as local Skin Infection  at injection site or local hematoma at injection site    Note: She is reporting that with trazodone 25 mg, she was sleeping well but it seems to have not been effective.  I will be increasing the dose of trazodone to 37.5 mg at bedtime.  Overall, cervicogenic headache frequency and intensity remains under good control with use of trigger point injection.

## 2021-06-10 ENCOUNTER — OFFICE VISIT (OUTPATIENT)
Dept: ENDOCRINOLOGY | Facility: CLINIC | Age: 36
End: 2021-06-10

## 2021-06-10 VITALS
BODY MASS INDEX: 22.75 KG/M2 | DIASTOLIC BLOOD PRESSURE: 60 MMHG | WEIGHT: 128.4 LBS | HEART RATE: 95 BPM | OXYGEN SATURATION: 100 % | SYSTOLIC BLOOD PRESSURE: 98 MMHG | HEIGHT: 63 IN

## 2021-06-10 DIAGNOSIS — E06.3 HASHIMOTO'S THYROIDITIS: ICD-10-CM

## 2021-06-10 DIAGNOSIS — E03.9 ACQUIRED HYPOTHYROIDISM: Primary | ICD-10-CM

## 2021-06-10 PROCEDURE — 99213 OFFICE O/P EST LOW 20 MIN: CPT | Performed by: INTERNAL MEDICINE

## 2021-06-10 RX ORDER — LEVOTHYROXINE SODIUM 88 UG/1
88 TABLET ORAL DAILY
Qty: 90 TABLET | Refills: 3 | Status: SHIPPED | OUTPATIENT
Start: 2021-06-10 | End: 2021-12-09 | Stop reason: SDUPTHER

## 2021-06-10 NOTE — PROGRESS NOTES
"     Office Note      Date: 06/10/2021  Patient Name: Regla West  MRN: [id number]  : 1985    Chief Complaint   Patient presents with   • Hypothyroidism       History of Present Illness:   Regla West is a 35 y.o. female who presents for Hypothyroidism    She remains on T4 88mcg qd. She is taking this correctly. She isn't taking any interfering meds concurrently. She hasn't noted any change in the size of her neck. She denies any compressive sxs. She denies any sxs of hypo- or hyperthyroidism at this time.     She had labs done recently.  TSH was 2.55.    Subjective      Review of Systems:   Review of Systems   Constitutional: Negative.    Cardiovascular: Negative.    Gastrointestinal: Negative.    Endocrine: Negative.        The following portions of the patient's history were reviewed and updated as appropriate: allergies, current medications, past family history, past medical history, past social history, past surgical history and problem list.    Objective     Visit Vitals  BP 98/60   Pulse 95   Ht 160 cm (63\")   Wt 58.2 kg (128 lb 6.4 oz)   SpO2 100%   BMI 22.75 kg/m²       Physical Exam:  Physical Exam  Constitutional:       Appearance: Normal appearance.   Neck:      Thyroid: No thyroid mass, thyromegaly or thyroid tenderness.   Lymphadenopathy:      Cervical: No cervical adenopathy.   Neurological:      Mental Status: She is alert.         Labs:    TSH  No results found for: TSHBASE     Free T4  Free T4   Date Value Ref Range Status   2021 1.40 0.93 - 1.70 ng/dL Final       T3  No results found for: D7KTSJY      TPO  Thyroid Peroxidase Antibody   Date Value Ref Range Status   2020 228 (H) 0 - 34 IU/mL Final       TG AB  Thyroglobulin Ab   Date Value Ref Range Status   2020 4.6 (H) 0.0 - 0.9 IU/mL Final     Comment:     Thyroglobulin Antibody measured by [facility] Method[facility]       TG  No results found for: THYROGLB    CBC w/DIFF  Lab Results   Component Value Date    " WBC 4.70 11/23/2020    RBC 4.62 11/23/2020    HGB 14.1 11/23/2020    HCT 43.0 11/23/2020    MCV 93.1 11/23/2020    MCH 30.6 11/23/2020    MCHC 32.8 11/23/2020    RDW 14.4 11/23/2020    RDWSD 44.4 11/16/2018    MPV 7.2 11/23/2020     11/23/2020    NEUTRORELPCT 59.7 11/23/2020    LYMPHORELPCT 35.3 11/23/2020    MONORELPCT 5.0 11/23/2020    EOSRELPCT 5.9 (H) 11/16/2018    BASORELPCT 0.4 11/16/2018    AUTOIGPER 0.2 11/16/2018    NEUTROABS 2.80 11/23/2020    LYMPHSABS 1.70 11/23/2020    MONOSABS 0.20 11/23/2020    EOSABS 0.32 (H) 11/16/2018    BASOSABS 0.02 11/16/2018    AUTOIGNUM 0.01 11/16/2018           Assessment / Plan      Assessment & Plan:  Diagnoses and all orders for this visit:    1. Acquired hypothyroidism (Primary)  Assessment & Plan:  TSH at goal.  Continue current T4 tx.      2. Hashimoto's thyroiditis  Assessment & Plan:  No goiter on exam.      Other orders  -     levothyroxine (SYNTHROID, LEVOTHROID) 88 MCG tablet; Take 1 tablet by mouth Daily.  Dispense: 90 tablet; Refill: 3      Return in about 6 months (around 12/10/2021) for Recheck with TSH.    Johnny Jones MD   06/10/2021

## 2021-06-24 DIAGNOSIS — K21.9 CHRONIC GERD: Primary | ICD-10-CM

## 2021-06-29 ENCOUNTER — APPOINTMENT (OUTPATIENT)
Dept: PREADMISSION TESTING | Facility: HOSPITAL | Age: 36
End: 2021-06-29

## 2021-06-29 LAB — SARS-COV-2 RNA PNL SPEC NAA+PROBE: NOT DETECTED

## 2021-06-29 PROCEDURE — U0004 COV-19 TEST NON-CDC HGH THRU: HCPCS

## 2021-06-29 PROCEDURE — C9803 HOPD COVID-19 SPEC COLLECT: HCPCS

## 2021-07-01 ENCOUNTER — OUTSIDE FACILITY SERVICE (OUTPATIENT)
Dept: GASTROENTEROLOGY | Facility: CLINIC | Age: 36
End: 2021-07-01

## 2021-07-01 PROCEDURE — 43239 EGD BIOPSY SINGLE/MULTIPLE: CPT | Performed by: INTERNAL MEDICINE

## 2021-07-01 PROCEDURE — 88305 TISSUE EXAM BY PATHOLOGIST: CPT | Performed by: INTERNAL MEDICINE

## 2021-07-02 ENCOUNTER — LAB REQUISITION (OUTPATIENT)
Dept: LAB | Facility: HOSPITAL | Age: 36
End: 2021-07-02

## 2021-07-02 DIAGNOSIS — K21.9 GASTRO-ESOPHAGEAL REFLUX DISEASE WITHOUT ESOPHAGITIS: ICD-10-CM

## 2021-07-09 DIAGNOSIS — F41.1 GENERALIZED ANXIETY DISORDER: ICD-10-CM

## 2021-07-09 RX ORDER — ESCITALOPRAM OXALATE 10 MG/1
10 TABLET ORAL DAILY
Qty: 90 TABLET | Refills: 3 | Status: SHIPPED | OUTPATIENT
Start: 2021-07-09 | End: 2022-07-22 | Stop reason: SDUPTHER

## 2021-07-12 DIAGNOSIS — R10.13 MIDEPIGASTRIC PAIN: Primary | ICD-10-CM

## 2021-07-22 ENCOUNTER — HOSPITAL ENCOUNTER (OUTPATIENT)
Dept: ULTRASOUND IMAGING | Facility: HOSPITAL | Age: 36
Discharge: HOME | End: 2021-07-22
Admitting: PHYSICIAN ASSISTANT

## 2021-07-22 DIAGNOSIS — R10.13 MIDEPIGASTRIC PAIN: ICD-10-CM

## 2021-07-22 PROCEDURE — 76705 ECHO EXAM OF ABDOMEN: CPT

## 2021-07-23 DIAGNOSIS — R10.13 MIDEPIGASTRIC PAIN: Primary | ICD-10-CM

## 2021-08-16 RX ORDER — TRAZODONE HYDROCHLORIDE 50 MG/1
50 TABLET ORAL NIGHTLY
Qty: 30 TABLET | Refills: 1 | Status: SHIPPED | OUTPATIENT
Start: 2021-08-16 | End: 2021-12-15 | Stop reason: SDUPTHER

## 2021-08-25 DIAGNOSIS — R11.0 NAUSEA: Primary | ICD-10-CM

## 2021-08-25 RX ORDER — ONDANSETRON HYDROCHLORIDE 8 MG/1
8 TABLET, FILM COATED ORAL EVERY 8 HOURS PRN
Qty: 30 TABLET | Refills: 0 | Status: SHIPPED | OUTPATIENT
Start: 2021-08-25 | End: 2023-01-30

## 2021-09-01 ENCOUNTER — CLINICAL SUPPORT (OUTPATIENT)
Dept: NEUROLOGY | Facility: CLINIC | Age: 36
End: 2021-09-01

## 2021-09-01 VITALS — WEIGHT: 126 LBS | HEIGHT: 63 IN | BODY MASS INDEX: 22.32 KG/M2 | HEART RATE: 90 BPM | OXYGEN SATURATION: 98 %

## 2021-09-01 DIAGNOSIS — G43.119 INTRACTABLE MIGRAINE WITH AURA WITHOUT STATUS MIGRAINOSUS: Primary | ICD-10-CM

## 2021-09-01 PROCEDURE — 20553 NJX 1/MLT TRIGGER POINTS 3/>: CPT | Performed by: PSYCHIATRY & NEUROLOGY

## 2021-09-01 RX ORDER — BUPIVACAINE HYDROCHLORIDE 2.5 MG/ML
10 INJECTION, SOLUTION INFILTRATION; PERINEURAL ONCE
Status: COMPLETED | OUTPATIENT
Start: 2021-09-01 | End: 2021-09-01

## 2021-09-01 RX ADMIN — BUPIVACAINE HYDROCHLORIDE 10 ML: 2.5 INJECTION, SOLUTION INFILTRATION; PERINEURAL at 15:54

## 2021-09-01 NOTE — PROGRESS NOTES
CC: Cervicogenic headaches.  Procedure Note:  1.         Patient was positioned comfortably  2.         Before injections are started, 10 Trigger Points sites are identified throughout the bilateral upper trapezius muscle, levator scapulae, and erector spinae muscles.    3.         Overlying skin area was cleaned with Alcohol swab                                                                                                              4.         Before injection, trigger points sites were palpated for local twitch and referred pain to confirms placement                         5.         Local anesthetic bupivacaine-total of 10 mL is used.  6.         30 gauge ½ inch needle was utilized to ensure patient comfort          7.         I started with the most tender spot in above mentioned Trigger Points   1.   Localize most tender spot within taut muscle-fibers  2.   Fix tender spot between fingers (1-2 cm in size)   1.   Prevents from rolling away from needle  2.   Controls subcutaneous bleeding  3.   Before injection, patient was instructed of possible pain on injection  4.   Direct needle at 30 degree angle off skin   8.         Insert needle into skin 1-2 cm from Trigger Point   9.         Advance needle into Trigger Point   10.       Use 1cc anesthetic at each Trigger Points identified in step #2  11.       Redirect needle and reinject                                                                                              1.   Withdraw needle to subcutaneous tissue  2.   Redirect needle into adjacent tender areas  3.   Repeat until local twitch or tautness resolves  12.   After each of the 10 injections I held direct pressure at injection site for 2 minutes to prevents hematoma formation  13.   The same procedure was repeated for other Tender Points located in the upper trapezius muscle, levator scapulae and erector spinae bilaterally  14.   Patient was instructed to gently stretch injected areas to full  active range of motion in all directions and was instructed to repeat range of motion three times after injection  15.   Post-Procedure patient was instructed to avoid over-using injected area for 3-4 days   1.   Maintain active range of motion of injected muscle  2.   Patient applies ice to injected areas for a few hours  3.   Anticipate post-injection soreness for 3-4 days  There was no evidence of complications from injection was noted such as local Skin Infection  at injection site or local hematoma at injection site    Note: I will see her back in 12 weeks for next injection.

## 2021-09-02 ENCOUNTER — OFFICE VISIT (OUTPATIENT)
Dept: OBSTETRICS AND GYNECOLOGY | Facility: CLINIC | Age: 36
End: 2021-09-02

## 2021-09-02 ENCOUNTER — HOSPITAL ENCOUNTER (OUTPATIENT)
Dept: NUCLEAR MEDICINE | Facility: HOSPITAL | Age: 36
Discharge: HOME | End: 2021-09-02

## 2021-09-02 VITALS — BODY MASS INDEX: 22.32 KG/M2 | WEIGHT: 126 LBS

## 2021-09-02 VITALS
DIASTOLIC BLOOD PRESSURE: 62 MMHG | BODY MASS INDEX: 22.93 KG/M2 | SYSTOLIC BLOOD PRESSURE: 104 MMHG | HEIGHT: 63 IN | WEIGHT: 129.4 LBS

## 2021-09-02 DIAGNOSIS — R10.13 MIDEPIGASTRIC PAIN: ICD-10-CM

## 2021-09-02 DIAGNOSIS — Z01.419 ENCOUNTER FOR ANNUAL ROUTINE GYNECOLOGICAL EXAMINATION: Primary | ICD-10-CM

## 2021-09-02 DIAGNOSIS — Z12.39 ENCOUNTER FOR BREAST CANCER SCREENING USING NON-MAMMOGRAM MODALITY: ICD-10-CM

## 2021-09-02 DIAGNOSIS — Z30.432 ENCOUNTER FOR REMOVAL OF INTRAUTERINE CONTRACEPTIVE DEVICE (IUD): ICD-10-CM

## 2021-09-02 PROCEDURE — 58301 REMOVE INTRAUTERINE DEVICE: CPT | Performed by: OBSTETRICS & GYNECOLOGY

## 2021-09-02 PROCEDURE — 78227 HEPATOBIL SYST IMAGE W/DRUG: CPT

## 2021-09-02 PROCEDURE — 25010000002 SINCALIDE PER 5 MCG: Performed by: PHYSICIAN ASSISTANT

## 2021-09-02 PROCEDURE — 99395 PREV VISIT EST AGE 18-39: CPT | Performed by: OBSTETRICS & GYNECOLOGY

## 2021-09-02 PROCEDURE — A9537 TC99M MEBROFENIN: HCPCS | Performed by: PHYSICIAN ASSISTANT

## 2021-09-02 PROCEDURE — 0 TECHNETIUM TC 99M MEBROFENIN KIT: Performed by: PHYSICIAN ASSISTANT

## 2021-09-02 RX ORDER — KIT FOR THE PREPARATION OF TECHNETIUM TC 99M MEBROFENIN 45 MG/10ML
1 INJECTION, POWDER, LYOPHILIZED, FOR SOLUTION INTRAVENOUS
Status: COMPLETED | OUTPATIENT
Start: 2021-09-02 | End: 2021-09-02

## 2021-09-02 RX ADMIN — MEBROFENIN 1 DOSE: 45 INJECTION, POWDER, LYOPHILIZED, FOR SOLUTION INTRAVENOUS at 13:15

## 2021-09-02 RX ADMIN — SINCALIDE 1.1 MCG: 5 INJECTION, POWDER, LYOPHILIZED, FOR SOLUTION INTRAVENOUS at 14:17

## 2021-09-02 NOTE — PROGRESS NOTES
GYN Annual Exam     CC - Here for annual exam.     Subjective   HPI  Regla West is a 35 y.o. female, , who presents for annual well woman exam. Patient's last menstrual period was 2021..  Periods are regular every 25-35 days, lasting 10 days. The patient uses 1 of tampons/pads per hour., lasting 1 days.  Dysmenorrhea:mild, occurring first 1-2 days of flow.  Patient reports problems with: none.  Partner Status: Marital Status: .  New Partners since last visit: no.  Desires STD Screening: no.  Patient has had the HPV vaccine.     Patient will need HPV vaccine today.     Additional OB/GYN History     Current contraception: contraceptive methods: IUD.   Desires to: continue contraception  Last Pap :   Last Completed Pap Smear          Ordered - PAP SMEAR (Every 3 Years) Ordered on 2020  Done - neg, neg HPV              History of abnormal Pap smear: no  Family history of uterine, colon, breast, or ovarian cancer: no  Previous Mammogram :  yes - for breast lump- normal  Performs monthly Self-Breast Exam: no  Exercises Regularly:yes  Feelings of Anxiety or Depression: yes - treated  Tobacco Usage?: No   OB History        2    Para   2    Term   2            AB        Living   2       SAB        TAB        Ectopic        Molar        Multiple   0    Live Births   2                Health Maintenance   Topic Date Due   • Annual Gynecologic Pelvic and Breast Exam  Never done   • ANNUAL PHYSICAL  Never done   • Pneumococcal Vaccine 0-64 (1 of 2 - PPSV23) Never done   • TDAP/TD VACCINES (1 - Tdap) Never done   • HEPATITIS C SCREENING  Never done   • LIPID PANEL  2020   • INFLUENZA VACCINE  10/01/2021   • PAP SMEAR  2023   • COVID-19 Vaccine  Completed           The additional following portions of the patient's history were reviewed and updated as appropriate: allergies, current medications, past family history, past medical history, past social history,  "past surgical history and problem list.    Review of Systems   Constitutional: Negative.    HENT: Negative.    Eyes: Negative.    Respiratory: Negative.    Cardiovascular: Negative.    Gastrointestinal: Negative.    Endocrine: Negative.    Genitourinary: Negative.    Musculoskeletal: Negative.    Skin: Negative.    Allergic/Immunologic: Negative.    Neurological: Negative.    Hematological: Negative.    Psychiatric/Behavioral: Negative.        I have reviewed and agree with the HPI, ROS, and historical information as entered above. Genoveva Sanders MD    Objective   /62   Ht 160 cm (63\")   Wt 58.7 kg (129 lb 6.4 oz)   LMP 09/02/2021   BMI 22.92 kg/m²     Physical Exam  Vitals and nursing note reviewed. Exam conducted with a chaperone present.   Constitutional:       Appearance: She is well-developed.   HENT:      Head: Normocephalic and atraumatic.   Neck:      Thyroid: No thyroid mass or thyromegaly.   Cardiovascular:      Rate and Rhythm: Normal rate and regular rhythm.      Heart sounds: No murmur heard.     Pulmonary:      Effort: Pulmonary effort is normal. No retractions.      Breath sounds: Normal breath sounds. No wheezing, rhonchi or rales.   Chest:      Chest wall: No mass or tenderness.      Breasts:         Right: Normal. No mass, nipple discharge, skin change or tenderness.         Left: Normal. No mass, nipple discharge, skin change or tenderness.          Comments: Dense tissue bilaterally but more prominent in right.  Has been evaluated in past and unchanged  Abdominal:      General: Bowel sounds are normal.      Palpations: Abdomen is soft. Abdomen is not rigid. There is no mass.      Tenderness: There is no abdominal tenderness. There is no guarding.      Hernia: No hernia is present. There is no hernia in the left inguinal area.   Genitourinary:     Labia:         Right: No rash, tenderness or lesion.         Left: No rash, tenderness or lesion.       Vagina: Normal. No vaginal discharge " or lesions.      Cervix: No cervical motion tenderness, discharge, lesion or cervical bleeding.      Uterus: Normal. Not enlarged, not fixed and not tender.       Adnexa:         Right: No mass or tenderness.          Left: No mass or tenderness.        Rectum: No external hemorrhoid.      Comments: IUD can be seen at external os.  REMOVED  Musculoskeletal:      Cervical back: Normal range of motion. No muscular tenderness.   Neurological:      Mental Status: She is alert and oriented to person, place, and time.   Psychiatric:         Behavior: Behavior normal.     IUD Removal Procedure Note    Procedures    Type of IUD:  Mirena   Date of insertion:  2 years  Reason for removal:  In cervix    Procedure Time Out Documentation    Procedure Details  IUD strings visible:  yes  Removal:  IUD strings grasped and IUD removed intact with gentle traction.  The patient tolerated the procedure well.    All appropriate instructions regarding removal were reviewed.    Tolerated well  No apparent complications  Post procedure diagnosis : IUD removal     Plans for contraception:  Vasectomy    The patient was advised to call for any fever or for prolonged or severe pain or bleeding. She was advised to use motrin as needed for mild to moderate pain.     Genoveva Sanders MD  09/02/2021      Assessment/Plan       Encounter Diagnoses   Name Primary?   • Encounter for annual routine gynecological examination Yes   • Encounter for breast cancer screening using non-mammogram modality    • Encounter for removal of intrauterine contraceptive device (IUD)        Plan     1. Recommended use of Vitamin D replacement and getting adequate calcium in her diet. (1500mg)  2. Reviewed monthly self breast exams.  Instructed to call with lumps, pain, or breast discharge.    3. Continue yearly mammography  4. Reviewed HPV guidelines.  5. Reviewed exercise as a preventative health measures.    6. IUD in cervix.  Removed.  Discussed options.  For now she  will try premenstrual NSAIDS.  DIscussed option of Novasure ablation at some point.      Genoveva Sanders MD  09/02/2021

## 2021-09-10 DIAGNOSIS — Z01.419 ENCOUNTER FOR ANNUAL ROUTINE GYNECOLOGICAL EXAMINATION: ICD-10-CM

## 2021-09-13 RX ORDER — AZITHROMYCIN 250 MG/1
250 TABLET, FILM COATED ORAL DAILY
Qty: 6 TABLET | Refills: 0 | Status: SHIPPED | OUTPATIENT
Start: 2021-09-13 | End: 2022-09-15

## 2021-10-06 ENCOUNTER — LAB (OUTPATIENT)
Dept: LAB | Facility: HOSPITAL | Age: 36
End: 2021-10-06

## 2021-10-06 DIAGNOSIS — E06.3 HASHIMOTO'S THYROIDITIS: Primary | ICD-10-CM

## 2021-10-06 DIAGNOSIS — E06.3 HASHIMOTO'S THYROIDITIS: ICD-10-CM

## 2021-10-06 DIAGNOSIS — E03.9 ACQUIRED HYPOTHYROIDISM: ICD-10-CM

## 2021-10-06 LAB
ALBUMIN SERPL-MCNC: 4.2 G/DL (ref 3.5–5.2)
ALBUMIN/GLOB SERPL: 1.4 G/DL
ALP SERPL-CCNC: 64 U/L (ref 39–117)
ALT SERPL W P-5'-P-CCNC: 14 U/L (ref 1–33)
ANION GAP SERPL CALCULATED.3IONS-SCNC: 10 MMOL/L (ref 5–15)
AST SERPL-CCNC: 21 U/L (ref 1–32)
BILIRUB SERPL-MCNC: 0.3 MG/DL (ref 0–1.2)
BUN SERPL-MCNC: 9 MG/DL (ref 6–20)
BUN/CREAT SERPL: 8.8 (ref 7–25)
CALCIUM SPEC-SCNC: 9.4 MG/DL (ref 8.6–10.5)
CHLORIDE SERPL-SCNC: 99 MMOL/L (ref 98–107)
CO2 SERPL-SCNC: 27 MMOL/L (ref 22–29)
CREAT SERPL-MCNC: 1.02 MG/DL (ref 0.57–1)
ERYTHROCYTE [DISTWIDTH] IN BLOOD BY AUTOMATED COUNT: 13.3 % (ref 12.3–15.4)
GFR SERPL CREATININE-BSD FRML MDRD: 61 ML/MIN/1.73
GLOBULIN UR ELPH-MCNC: 2.9 GM/DL
GLUCOSE SERPL-MCNC: 90 MG/DL (ref 65–99)
HCT VFR BLD AUTO: 38.9 % (ref 34–46.6)
HGB BLD-MCNC: 13.1 G/DL (ref 12–15.9)
LYMPHOCYTES # BLD AUTO: 2.1 10*3/MM3 (ref 0.7–3.1)
LYMPHOCYTES NFR BLD AUTO: 36.7 % (ref 19.6–45.3)
MAGNESIUM SERPL-MCNC: 1.7 MG/DL (ref 1.6–2.6)
MCH RBC QN AUTO: 30.7 PG (ref 26.6–33)
MCHC RBC AUTO-ENTMCNC: 33.8 G/DL (ref 31.5–35.7)
MCV RBC AUTO: 91 FL (ref 79–97)
MONOCYTES # BLD AUTO: 0.4 10*3/MM3 (ref 0.1–0.9)
MONOCYTES NFR BLD AUTO: 7.4 % (ref 5–12)
NEUTROPHILS NFR BLD AUTO: 3.2 10*3/MM3 (ref 1.7–7)
NEUTROPHILS NFR BLD AUTO: 55.9 % (ref 42.7–76)
PLATELET # BLD AUTO: 187 10*3/MM3 (ref 140–450)
PMV BLD AUTO: 7.2 FL (ref 6–12)
POTASSIUM SERPL-SCNC: 3.8 MMOL/L (ref 3.5–5.2)
PROT SERPL-MCNC: 7.1 G/DL (ref 6–8.5)
RBC # BLD AUTO: 4.27 10*6/MM3 (ref 3.77–5.28)
SODIUM SERPL-SCNC: 136 MMOL/L (ref 136–145)
TSH SERPL DL<=0.05 MIU/L-ACNC: 3.76 UIU/ML (ref 0.27–4.2)
WBC # BLD AUTO: 5.7 10*3/MM3 (ref 3.4–10.8)

## 2021-10-06 PROCEDURE — 84443 ASSAY THYROID STIM HORMONE: CPT

## 2021-10-06 PROCEDURE — 85025 COMPLETE CBC W/AUTO DIFF WBC: CPT

## 2021-10-06 PROCEDURE — 36415 COLL VENOUS BLD VENIPUNCTURE: CPT

## 2021-10-06 PROCEDURE — 80053 COMPREHEN METABOLIC PANEL: CPT

## 2021-10-06 PROCEDURE — 83735 ASSAY OF MAGNESIUM: CPT

## 2021-10-19 DIAGNOSIS — M25.559 HIP PAIN: Primary | ICD-10-CM

## 2021-10-19 RX ORDER — LIDOCAINE 50 MG/G
1 PATCH TOPICAL EVERY 24 HOURS
Qty: 30 PATCH | Refills: 3 | Status: SHIPPED | OUTPATIENT
Start: 2021-10-19

## 2021-11-12 ENCOUNTER — TELEPHONE (OUTPATIENT)
Dept: NEUROLOGY | Facility: CLINIC | Age: 36
End: 2021-11-12

## 2021-11-12 NOTE — TELEPHONE ENCOUNTER
Provider: DR CHAPMAN   Caller:  DRAKE   Relationship to Patient:  PT   Pharmacy:  [facility]   Phone Number:[phone]  Reason for Call:  PT CALLING IN REGARDING PRE AUTH FOR Rimegepant Sulfate (Nurtec) 75 MG tablet dispersible tablet   PT WAS ADVISED BY PHARMACY THAT THE PRE AUTH WAS REQUIRED PLEASE ADVISE   When was the patient last seen: 09/01/2021  PER DEVAN SAMPLES ARE AVAILABLE  Pt will  today

## 2021-11-16 ENCOUNTER — PATIENT MESSAGE (OUTPATIENT)
Dept: NEUROLOGY | Facility: CLINIC | Age: 36
End: 2021-11-16

## 2021-12-06 ENCOUNTER — LAB (OUTPATIENT)
Dept: LAB | Facility: HOSPITAL | Age: 36
End: 2021-12-06

## 2021-12-06 DIAGNOSIS — E06.3 HASHIMOTO'S THYROIDITIS: ICD-10-CM

## 2021-12-06 DIAGNOSIS — E06.3 HASHIMOTO'S THYROIDITIS: Primary | ICD-10-CM

## 2021-12-06 LAB
T4 FREE SERPL-MCNC: 1.36 NG/DL (ref 0.93–1.7)
TSH SERPL DL<=0.05 MIU/L-ACNC: 1.81 UIU/ML (ref 0.27–4.2)

## 2021-12-06 PROCEDURE — 84439 ASSAY OF FREE THYROXINE: CPT

## 2021-12-06 PROCEDURE — 36415 COLL VENOUS BLD VENIPUNCTURE: CPT

## 2021-12-06 PROCEDURE — 84443 ASSAY THYROID STIM HORMONE: CPT

## 2021-12-06 PROCEDURE — 86769 SARS-COV-2 COVID-19 ANTIBODY: CPT

## 2021-12-07 LAB
SARS-COV-2 AB SERPL IA-ACNC: >2500 U/ML
SARS-COV-2 AB SERPL-IMP: POSITIVE

## 2021-12-09 ENCOUNTER — OFFICE VISIT (OUTPATIENT)
Dept: ENDOCRINOLOGY | Facility: CLINIC | Age: 36
End: 2021-12-09

## 2021-12-09 VITALS
HEART RATE: 63 BPM | WEIGHT: 130 LBS | HEIGHT: 63 IN | DIASTOLIC BLOOD PRESSURE: 61 MMHG | SYSTOLIC BLOOD PRESSURE: 108 MMHG | BODY MASS INDEX: 23.04 KG/M2 | OXYGEN SATURATION: 98 %

## 2021-12-09 DIAGNOSIS — E06.3 HASHIMOTO'S THYROIDITIS: ICD-10-CM

## 2021-12-09 DIAGNOSIS — E03.9 ACQUIRED HYPOTHYROIDISM: Primary | ICD-10-CM

## 2021-12-09 PROCEDURE — 99213 OFFICE O/P EST LOW 20 MIN: CPT | Performed by: INTERNAL MEDICINE

## 2021-12-09 RX ORDER — LEVOTHYROXINE SODIUM 88 UG/1
88 TABLET ORAL DAILY
Qty: 90 TABLET | Refills: 3 | Status: SHIPPED | OUTPATIENT
Start: 2021-12-09 | End: 2022-10-20 | Stop reason: SDUPTHER

## 2021-12-09 NOTE — PROGRESS NOTES
"     Office Note      Date: 2021  Patient Name: Regla West  MRN: [id number]  : 1985    Chief Complaint   Patient presents with   • Hypothyroidism       History of Present Illness:   Regla West is a 35 y.o. female who presents for Hypothyroidism    She remains on T4 88mcg qd. She is taking this correctly. She isn't taking any interfering meds concurrently. She hasn't noted any change in the size of her neck. She denies any compressive sxs. She denies any sxs of hypo- or hyperthyroidism at this time, aside from fatigue.     She had labs done recently.  TSH was 1.8.    Subjective      Review of Systems:   Review of Systems   Constitutional: Negative.    Cardiovascular: Negative.    Gastrointestinal: Negative.    Endocrine: Negative.        The following portions of the patient's history were reviewed and updated as appropriate: allergies, current medications, past family history, past medical history, past social history, past surgical history and problem list.    Objective     Visit Vitals  /61   Pulse 63   Ht 160 cm (63\")   Wt 59 kg (130 lb)   SpO2 98%   BMI 23.03 kg/m²       Physical Exam:  Physical Exam  Constitutional:       Appearance: Normal appearance.   Neck:      Thyroid: No thyroid mass, thyromegaly or thyroid tenderness.   Lymphadenopathy:      Cervical: No cervical adenopathy.   Neurological:      Mental Status: She is alert.         Labs:    TSH  No results found for: TSHBASE     Free T4  Free T4   Date Value Ref Range Status   2021 1.36 0.93 - 1.70 ng/dL Final       T3  No results found for: I8AOECY      TPO  Thyroid Peroxidase Antibody   Date Value Ref Range Status   2020 228 (H) 0 - 34 IU/mL Final       TG AB  Thyroglobulin Ab   Date Value Ref Range Status   2020 4.6 (H) 0.0 - 0.9 IU/mL Final     Comment:     Thyroglobulin Antibody measured by [facility] Methodology       TG  No results found for: THYROGLB    CBC w/DIFF  Lab Results   Component " Value Date    WBC 5.70 10/06/2021    RBC 4.27 10/06/2021    HGB 13.1 10/06/2021    HCT 38.9 10/06/2021    MCV 91.0 10/06/2021    MCH 30.7 10/06/2021    MCHC 33.8 10/06/2021    RDW 13.3 10/06/2021    RDWSD 44.4 11/16/2018    MPV 7.2 10/06/2021     10/06/2021    NEUTRORELPCT 55.9 10/06/2021    LYMPHORELPCT 36.7 10/06/2021    MONORELPCT 7.4 10/06/2021    EOSRELPCT 5.9 (H) 11/16/2018    BASORELPCT 0.4 11/16/2018    AUTOIGPER 0.2 11/16/2018    NEUTROABS 3.20 10/06/2021    LYMPHSABS 2.10 10/06/2021    MONOSABS 0.40 10/06/2021    EOSABS 0.32 (H) 11/16/2018    BASOSABS 0.02 11/16/2018    AUTOIGNUM 0.01 11/16/2018           Assessment / Plan      Assessment & Plan:  Diagnoses and all orders for this visit:    1. Acquired hypothyroidism (Primary)  Assessment & Plan:  Recent TSH okay.  Continue current T4 dose.      2. Hashimoto's thyroiditis  Assessment & Plan:  No goiter on exam.      Other orders  -     levothyroxine (SYNTHROID, LEVOTHROID) 88 MCG tablet; Take 1 tablet by mouth Daily.  Dispense: 90 tablet; Refill: 3      Return in about 6 months (around 6/9/2022) for Recheck with TSH..    Johnny Jones MD   12/09/2021

## 2021-12-15 RX ORDER — TRAZODONE HYDROCHLORIDE 50 MG/1
50 TABLET ORAL NIGHTLY
Qty: 30 TABLET | Refills: 1 | Status: SHIPPED | OUTPATIENT
Start: 2021-12-15 | End: 2022-03-08 | Stop reason: SDUPTHER

## 2021-12-30 DIAGNOSIS — L70.0 ACNE VULGARIS: ICD-10-CM

## 2021-12-30 RX ORDER — CLINDAMYCIN PHOSPHATE 11.9 MG/ML
SOLUTION TOPICAL
Qty: 60 ML | Refills: 11 | Status: SHIPPED | OUTPATIENT
Start: 2021-12-30 | End: 2023-03-07 | Stop reason: SDUPTHER

## 2022-01-03 ENCOUNTER — DOCUMENTATION (OUTPATIENT)
Dept: PHARMACY | Facility: TELEHEALTH | Age: 37
End: 2022-01-03

## 2022-01-04 ENCOUNTER — SPECIALTY PHARMACY (OUTPATIENT)
Dept: PHARMACY | Facility: TELEHEALTH | Age: 37
End: 2022-01-04

## 2022-01-04 NOTE — PROGRESS NOTES
Specialty Pharmacy Refill Coordination Note     Regla is a 36 y.o. female contacted today regarding refills of  [facility] specialty medication(s).    Reviewed and verified with patient:       Specialty medication(s) and dose(s) confirmed: yes    Refill Questions      Most Recent Value   Changes to allergies? No   Changes to medications? Yes   New conditions since last clinic visit No   Unplanned office visit, urgent care, ED, or hospital admission in the last 4 weeks  No   How does patient/caregiver feel medication is working? Good   Financial problems or insurance changes  No   How many doses of your specialty medications were missed in the last 4 weeks? 0 eposodic   Does this patient require a clinical escalation to a pharmacist? No          Delivery Questions      Most Recent Value   Delivery method  at Pharmacy                 Follow-up: 1  month(s)     Marybel Varghese, Pharmacy Technician  Specialty Pharmacy Technician

## 2022-01-04 NOTE — PROGRESS NOTES
Specialty Pharmacy Patient Management Program  Initial Assessment     Regla West is a 36 y.o. female with  Chronic migraine and enrolled in the Patient Management program offered by [facility].  An initial outreach was conducted, including assessment of therapy appropriateness and specialty medication education for [facility] . The patient was introduced to services offered by [facility], including: regular assessments, refill coordination, curbside pick-up or mail order delivery options, prior authorization maintenance, and financial assistance programs as applicable. The patient was also provided with contact information for the pharmacy team.     Insurance Coverage & Financial Support  [employer]    Relevant Past Medical History and Comorbidities  Relevant medical history and concomitant health conditions were discussed with the patient. The patient's chart has been reviewed for relevant past medical history and comorbid health conditions and updated as necessary.   Past Medical History:   Diagnosis Date   • Anemia     Taking iron   • Disease of thyroid gland     Takes synthroid   • Hashimoto's thyroiditis    • Headache    • Hypothyroidism    • Migraine    • Pain in joint, lower leg      Social History     Socioeconomic History   • Marital status:    Tobacco Use   • Smoking status: Never Smoker   • Smokeless tobacco: Never Used   Vaping Use   • Vaping Use: Never used   Substance and Sexual Activity   • Alcohol use: Yes     Alcohol/week: 1.0 standard drink     Types: 1 Glasses of wine per week   • Drug use: No   • Sexual activity: Yes     Partners: Male       Allergies  Known allergies and reactions were discussed with the patient. The patient's chart has been reviewed for  allergy information and updated as necessary.   Macrobid [nitrofurantoin monohyd macro]    Current Medication List  This medication list has been reviewed with the patient and evaluated  for any interactions or necessary modifications/recommendations, and updated to include all prescription medications, OTC medications, and supplements the patient is currently taking.  This list reflects what is contained in the patient's profile, which has also been marked as reviewed to communicate to other providers it is the most up to date version of the patient's current medication therapy.     Current Outpatient Medications:   •  azithromycin (Zithromax Z-Seamus) 250 MG tablet, Take 2 tablets by mouth on day 1, then take 1 tablet by mouth on days 2 through 5., Disp: 6 tablet, Rfl: 0  •  Cholecalciferol 25 MCG (1000 UT) chewable tablet, Chew 1 capsule., Disp: , Rfl:   •  clindamycin (CLEOCIN T) 1 % external solution, Apply topically to the affected area on face as directed 2 (two) times a day., Disp: 60 mL, Rfl: 11  •  doxycycline (ADOXA) 100 MG tablet, Take 1 tablet by mouth 2 (two) times a day., Disp: 14 tablet, Rfl: 0  •  escitalopram (Lexapro) 10 MG tablet, Take 1 tablet by mouth Daily. (Patient taking differently: Take 5 mg by mouth Daily.), Disp: 90 tablet, Rfl: 3  •  levothyroxine (SYNTHROID, LEVOTHROID) 88 MCG tablet, Take 1 tablet by mouth Daily., Disp: 90 tablet, Rfl: 3  •  lidocaine (LIDODERM) 5 %, Place 1 patch on the skin as directed by provider Daily. Remove & Discard patch within 12 hours or as directed by MD (Patient taking differently: Place 1 patch on the skin as directed by provider As Needed. Remove & Discard patch within 12 hours or as directed by MD), Disp: 30 patch, Rfl: 3  •  loratadine (CLARITIN) 10 MG tablet, Take 10 mg by mouth Daily., Disp: , Rfl:   •  melatonin 5 MG tablet tablet, Take 5 mg by mouth Every Night., Disp: , Rfl:   •  Multiple Vitamins-Minerals (HAIR SKIN AND NAILS FORMULA PO), Take  by mouth., Disp: , Rfl:   •  ondansetron (Zofran) 8 MG tablet, Take 1 tablet by mouth Every 8 (Eight) Hours As Needed for Nausea or Vomiting., Disp: 30 tablet, Rfl: 0  •  Rimegepant Sulfate  (Nurtec) 75 MG tablet dispersible tablet, Take 1 tablet by mouth Daily As Needed (MIGRAINE)., Disp: 8 tablet, Rfl: 3  •  traZODone (DESYREL) 50 MG tablet, Take 1 tablet by mouth Every Night. (Patient taking differently: Take 25 mg by mouth Every Night.), Disp: 30 tablet, Rfl: 1    Drug Interactions  None    Relevant Laboratory Values  No results for input(s): CMP, BMP, CBC in the last 72 hours.    Initial Education Provided for Specialty Medication  The patient has been provided with the following education and any applicable administration techniques (i.e. self-injection) have been demonstrated for the therapies indicated. All questions and concerns have been addressed prior to the patient receiving the medication, and the patient has verbalized understanding of the education and any materials provided.  Additional patient education shall be provided and documented upon request by the patient, provider or payer.      Nurtec (rimegepant)  Medication Expectations   Why am I taking this medication? You are taking this medication for migraine prophylaxis or to treat an acute migraine.   What should I expect while on this medication? You should expect to see a decrease in the frequency and severity of your migraines.   How does the medication work? Nurtec is a monoclonal antibody that binds to calcitonin gene-related peptide (CGRP) and blocks its binding to the receptor decreasing the severity of migraines.   How long will I be on this medication for? The amount of time you will be on this medication will be determined by your doctor and your response to the medication.    How do I take this medication? Take as directed on your prescription label.   What are some possible side effects? Potential side effects including, but not limited to nausea. Pt verbalized understanding.   What happens if I miss a dose? Take the missed dose as soon as possible, and resume the every other day timed from the last dose..     Medication  Safety   What are things I should warn my doctor immediately about? Hypersensitivity reactions - trouble breathing or swallowing.   What are things that I should be cautious of? Hypersensitivity reactions (eg, dyspnea, rash), including delayed serious reactions, have occurred; discontinue use if suspected    What are some medications that can interact with this one? Avoid concomitant administration of Nurtec ODT with strong inhibitors of CY, strong or moderate inducers of CYP3A or inhibitors of P-gp or BCRP. Avoid another dose of Nurtec ODT within 48 hours when it is administered with moderate inhibitors of CY.  Ask your pharmacist or health care provider before starting new medications     Medication Storage/Handling   How should I handle this medication? Keep this medication out of reach of pets/children in original container. Ensure hands are dry before opening blister pack.   How does this medication need to be stored? Store at room temperature away from heat/cold, sunlight or moisture   How should I dispose of this medication? There should not be a need to dispose of this medication unless your provider decides to change the dose or therapy. If that is the case, take to your local police station for proper disposal. Some pharmacies also have take-back bins for medication drop-off.      Resources/Support   How can I remind myself to take this medication? You can download reminder apps to help you manage your refills. You may also set an alarm on your phone to remind you. The pharmacy carries pill boxes that you can place next to an area you pass everyday (such as where you place your car keys or where you charge your phone)   Is financial support available?  Yes, [employer] can provide co-pay cards if you have commercial insurance or patient assistance if you have Medicare or no insurance.    Which vaccines are recommended for me? Talk to your doctor about these vaccines: Flu, Coronavirus  (COVID-19), Pneumococcal (pneumonia), Tdap, Hepatitis B, Zoster (shingles)        Adherence and Self-Administration  • Barriers to Patient Adherence and/or Self-Administration: none  Methods for Supporting Patient Adherence and/or Self-Administration:none    Goals of Therapy  • Patient Goals of Therapy: Reduction of migraines and severity  Clinical Goals or Therapeutic Targets, If Applicable: reduction of usage of nurtec    Reassessment Plan & Follow-Up  1. Medication Therapy Changes:none  2. Additional Plans, Therapy Recommendations, or Therapy Problems to Be Addressed: none  3. Pharmacist to perform regular reassessments no more than (6) months from the previous assessment.  4. Welcome information and patient satisfaction survey to be sent by retail team with patient's initial fill.  5. Care Coordinator to set up future refill outreaches, coordinate prescription delivery, and escalate clinical questions to pharmacist.     Attestation  I attest that the initiated specialty medication(s) are appropriate for the patient based on my assessment.  If the prescribed therapy is at any point deemed not appropriate based on the current or future assessments, a consultation will be initiated with the patient's specialty care provider to determine the best course of action. The revised plan of therapy will be documented along with any additional patient education provided.     Electronically signed by Al Mejia RPH, 01/04/22, 10:28 AM EST.

## 2022-01-10 ENCOUNTER — TELEPHONE (OUTPATIENT)
Dept: NEUROLOGY | Facility: CLINIC | Age: 37
End: 2022-01-10

## 2022-01-10 NOTE — TELEPHONE ENCOUNTER
DR ADELA SEO PA DEPT CALLED    [phone]    [facility] SAYS PATIENT TRIED TO FILL ON 1-4 BUT THAT IT WAS REJECTED. SAID CAN EITHER SUBMIT THRU [facility] OR BY PHONE @ [phone]

## 2022-01-12 NOTE — TELEPHONE ENCOUNTER
April FROM [facility] IS CALLING FOR PA. YOU CAN CALL HER [phone] I GAVE HER THE TEE # BUT SHE SAID THEY HAVE NOT RECEIVED  RX  YET.

## 2022-01-28 ENCOUNTER — SPECIALTY PHARMACY (OUTPATIENT)
Dept: PHARMACY | Facility: TELEHEALTH | Age: 37
End: 2022-01-28

## 2022-01-28 NOTE — PROGRESS NOTES
Called pt. She is due for a refill but doesn't need it at this time. I will set up a reminder for one month because I am not sure it will auto populate without being filled. She is doing well with no changes though.     Marybel Varghese, Pharmacy Technician  Specialty Pharmacy Technician

## 2022-02-23 ENCOUNTER — SPECIALTY PHARMACY (OUTPATIENT)
Dept: PHARMACY | Facility: TELEHEALTH | Age: 37
End: 2022-02-23

## 2022-02-23 ENCOUNTER — DOCUMENTATION (OUTPATIENT)
Dept: PHARMACY | Facility: TELEHEALTH | Age: 37
End: 2022-02-23

## 2022-02-24 ENCOUNTER — SPECIALTY PHARMACY (OUTPATIENT)
Dept: PHARMACY | Facility: TELEHEALTH | Age: 37
End: 2022-02-24

## 2022-02-24 NOTE — PROGRESS NOTES
PA approval for Nurtec until 8/23/22     Marybel Varghese, Pharmacy Technician  Specialty Pharmacy Technician

## 2022-02-24 NOTE — TELEPHONE ENCOUNTER
JASWINDER CAGLE/ [facility] IS CALLING TO STATE [facility] OF [facility].  APPROVED 02/24/22-08/23/22.

## 2022-02-28 ENCOUNTER — SPECIALTY PHARMACY (OUTPATIENT)
Dept: PHARMACY | Facility: HOSPITAL | Age: 37
End: 2022-02-28

## 2022-02-28 NOTE — PROGRESS NOTES
Specialty Pharmacy Refill Coordination Note     Regla is a 36 y.o. female contacted today regarding refills of  [facility] specialty medication(s).    Reviewed and verified with patient:       Specialty medication(s) and dose(s) confirmed: yes    Refill Questions      Most Recent Value   Changes to allergies? No   Changes to medications? No   New conditions since last clinic visit No   Unplanned office visit, urgent care, ED, or hospital admission in the last 4 weeks  No   How does patient/caregiver feel medication is working? Very good   Financial problems or insurance changes  No   If yes, describe changes in insurance or financial issues. N/A   How many doses of your specialty medications were missed in the last 4 weeks? 0   Why were doses missed? N/A   Does this patient require a clinical escalation to a pharmacist? No                     Follow-up: 21 day(s)     Marybel Varghese, Pharmacy Technician  Specialty Pharmacy Technician

## 2022-03-08 ENCOUNTER — CLINICAL SUPPORT (OUTPATIENT)
Dept: NEUROLOGY | Facility: CLINIC | Age: 37
End: 2022-03-08

## 2022-03-08 VITALS
SYSTOLIC BLOOD PRESSURE: 138 MMHG | HEART RATE: 83 BPM | HEIGHT: 63 IN | WEIGHT: 126 LBS | BODY MASS INDEX: 22.32 KG/M2 | DIASTOLIC BLOOD PRESSURE: 70 MMHG | OXYGEN SATURATION: 94 %

## 2022-03-08 DIAGNOSIS — G43.119 INTRACTABLE MIGRAINE WITH AURA WITHOUT STATUS MIGRAINOSUS: Primary | ICD-10-CM

## 2022-03-08 DIAGNOSIS — G44.229 CHRONIC TENSION-TYPE HEADACHE, NOT INTRACTABLE: ICD-10-CM

## 2022-03-08 PROCEDURE — 20553 NJX 1/MLT TRIGGER POINTS 3/>: CPT | Performed by: PSYCHIATRY & NEUROLOGY

## 2022-03-08 RX ORDER — TRAZODONE HYDROCHLORIDE 50 MG/1
50 TABLET ORAL NIGHTLY
Qty: 30 TABLET | Refills: 2 | Status: SHIPPED | OUTPATIENT
Start: 2022-03-08 | End: 2022-09-25 | Stop reason: SDUPTHER

## 2022-03-08 NOTE — PROGRESS NOTES
CC: Cervicogenic headaches.  Procedure Note:  1.         Patient was positioned comfortably  2.         Before injections are started, 10 Trigger Points sites are identified throughout the bilateral upper trapezius muscle, levator scapulae, and erector spinae muscles.    3.         Overlying skin area was cleaned with Alcohol swab                                                                                                              4.         Before injection, trigger points sites were palpated for local twitch and referred pain to confirms placement                         5.         Local anesthetic bupivacaine-total of 10 mL is used.  6.         30 gauge ½ inch needle was utilized to ensure patient comfort          7.         I started with the most tender spot in above mentioned Trigger Points   1.   Localize most tender spot within taut muscle-fibers  2.   Fix tender spot between fingers (1-2 cm in size)   1.   Prevents from rolling away from needle  2.   Controls subcutaneous bleeding  3.   Before injection, patient was instructed of possible pain on injection  4.   Direct needle at 30 degree angle off skin   8.         Insert needle into skin 1-2 cm from Trigger Point   9.         Advance needle into Trigger Point   10.       Use 1cc anesthetic at each Trigger Points identified in step #2  11.       Redirect needle and reinject                                                                                              1.   Withdraw needle to subcutaneous tissue  2.   Redirect needle into adjacent tender areas  3.   Repeat until local twitch or tautness resolves  12.   After each of the 10 injections I held direct pressure at injection site for 2 minutes to prevents hematoma formation  13.   The same procedure was repeated for other Tender Points located in the upper trapezius muscle, levator scapulae and erector spinae bilaterally  14.   Patient was instructed to gently stretch injected areas to full  active range of motion in all directions and was instructed to repeat range of motion three times after injection  15.   Post-Procedure patient was instructed to avoid over-using injected area for 3-4 days   1.   Maintain active range of motion of injected muscle  2.   Patient applies ice to injected areas for a few hours  3.   Anticipate post-injection soreness for 3-4 days  There was no evidence of complications from injection was noted such as local Skin Infection  at injection site or local hematoma at injection site    Note: I will see her back in 16 weeks for next injection.

## 2022-03-11 DIAGNOSIS — R10.11 RIGHT UPPER QUADRANT ABDOMINAL PAIN: Primary | ICD-10-CM

## 2022-04-01 ENCOUNTER — DOCUMENTATION (OUTPATIENT)
Dept: PHARMACY | Facility: TELEHEALTH | Age: 37
End: 2022-04-01

## 2022-04-21 ENCOUNTER — SPECIALTY PHARMACY (OUTPATIENT)
Dept: PHARMACY | Facility: TELEHEALTH | Age: 37
End: 2022-04-21

## 2022-04-21 NOTE — PROGRESS NOTES
Specialty Pharmacy Refill Coordination Note     Regla is a 36 y.o. female contacted today regarding refills of  Nurtec 75mg specialty medication(s).    Reviewed and verified with patient:         Specialty medication(s) and dose(s) confirmed: yes    Refill Questions    Flowsheet Row Most Recent Value   Changes to allergies? No   Changes to medications? No   New conditions since last clinic visit No   Unplanned office visit, urgent care, ED, or hospital admission in the last 4 weeks  No   How does patient/caregiver feel medication is working? Very good   Financial problems or insurance changes  No   If yes, describe changes in insurance or financial issues. N/A   How many doses of your specialty medications were missed in the last 4 weeks? 0   Why were doses missed? N/A   Does this patient require a clinical escalation to a pharmacist? No                     Follow-up: 21 day(s)     Marybel Varghese, Pharmacy Technician  Specialty Pharmacy Technician

## 2022-05-04 ENCOUNTER — PRIOR AUTHORIZATION (OUTPATIENT)
Dept: NEUROLOGY | Facility: CLINIC | Age: 37
End: 2022-05-04

## 2022-05-04 NOTE — TELEPHONE ENCOUNTER
PA has been started.     Regla Page (Key: [id number]) - [id number]  Nurtec 75MG dispersible tablets

## 2022-05-20 ENCOUNTER — DOCUMENTATION (OUTPATIENT)
Dept: PHARMACY | Facility: TELEHEALTH | Age: 37
End: 2022-05-20

## 2022-06-01 DIAGNOSIS — E06.3 HASHIMOTO'S THYROIDITIS: Primary | ICD-10-CM

## 2022-06-15 ENCOUNTER — TELEPHONE (OUTPATIENT)
Dept: GYNECOLOGIC ONCOLOGY | Facility: CLINIC | Age: 37
End: 2022-06-15

## 2022-06-15 NOTE — TELEPHONE ENCOUNTER
Caller: [facility]    Relationship: [employer]    Best call back number: [phone]    What was the call regarding: [facility] CALLED TO SAY THAT [facility] NEEDS A PRIOR AUTH FOR REFILL

## 2022-06-16 ENCOUNTER — PRIOR AUTHORIZATION (OUTPATIENT)
Dept: GYNECOLOGIC ONCOLOGY | Facility: CLINIC | Age: 37
End: 2022-06-16

## 2022-06-21 ENCOUNTER — LAB (OUTPATIENT)
Dept: LAB | Facility: HOSPITAL | Age: 37
End: 2022-06-21

## 2022-06-21 DIAGNOSIS — E06.3 HASHIMOTO'S THYROIDITIS: ICD-10-CM

## 2022-06-21 DIAGNOSIS — R10.11 RIGHT UPPER QUADRANT ABDOMINAL PAIN: ICD-10-CM

## 2022-06-21 LAB
ERYTHROCYTE [DISTWIDTH] IN BLOOD BY AUTOMATED COUNT: 14.7 % (ref 12.3–15.4)
FERRITIN SERPL-MCNC: 23.17 NG/ML (ref 13–150)
HCT VFR BLD AUTO: 45.4 % (ref 34–46.6)
HGB BLD-MCNC: 14 G/DL (ref 12–15.9)
IRON 24H UR-MRATE: 122 MCG/DL (ref 37–145)
IRON SATN MFR SERPL: 26 % (ref 20–50)
LYMPHOCYTES # BLD AUTO: 1.9 10*3/MM3 (ref 0.7–3.1)
LYMPHOCYTES NFR BLD AUTO: 33.2 % (ref 19.6–45.3)
MCH RBC QN AUTO: 28.7 PG (ref 26.6–33)
MCHC RBC AUTO-ENTMCNC: 31 G/DL (ref 31.5–35.7)
MCV RBC AUTO: 92.8 FL (ref 79–97)
MONOCYTES # BLD AUTO: 0.3 10*3/MM3 (ref 0.1–0.9)
MONOCYTES NFR BLD AUTO: 4.5 % (ref 5–12)
NEUTROPHILS NFR BLD AUTO: 3.6 10*3/MM3 (ref 1.7–7)
NEUTROPHILS NFR BLD AUTO: 62.3 % (ref 42.7–76)
PLATELET # BLD AUTO: 188 10*3/MM3 (ref 140–450)
PMV BLD AUTO: 7.4 FL (ref 6–12)
RBC # BLD AUTO: 4.89 10*6/MM3 (ref 3.77–5.28)
T4 FREE SERPL-MCNC: 1.14 NG/DL (ref 0.93–1.7)
TIBC SERPL-MCNC: 463 MCG/DL (ref 298–536)
TRANSFERRIN SERPL-MCNC: 311 MG/DL (ref 200–360)
TSH SERPL DL<=0.05 MIU/L-ACNC: 3.32 UIU/ML (ref 0.27–4.2)
WBC NRBC COR # BLD: 5.8 10*3/MM3 (ref 3.4–10.8)

## 2022-06-21 PROCEDURE — 82784 ASSAY IGA/IGD/IGG/IGM EACH: CPT

## 2022-06-21 PROCEDURE — 86364 TISS TRNSGLTMNASE EA IG CLAS: CPT

## 2022-06-21 PROCEDURE — 82728 ASSAY OF FERRITIN: CPT

## 2022-06-21 PROCEDURE — 84439 ASSAY OF FREE THYROXINE: CPT

## 2022-06-21 PROCEDURE — 85025 COMPLETE CBC W/AUTO DIFF WBC: CPT

## 2022-06-21 PROCEDURE — 36415 COLL VENOUS BLD VENIPUNCTURE: CPT

## 2022-06-21 PROCEDURE — 84466 ASSAY OF TRANSFERRIN: CPT

## 2022-06-21 PROCEDURE — 86231 EMA EACH IG CLASS: CPT

## 2022-06-21 PROCEDURE — 83540 ASSAY OF IRON: CPT

## 2022-06-21 PROCEDURE — 84443 ASSAY THYROID STIM HORMONE: CPT

## 2022-06-22 ENCOUNTER — SPECIALTY PHARMACY (OUTPATIENT)
Dept: PHARMACY | Facility: TELEHEALTH | Age: 37
End: 2022-06-22

## 2022-06-22 LAB
ENDOMYSIUM IGA SER QL: NEGATIVE
IGA SERPL-MCNC: 152 MG/DL (ref 87–352)
TTG IGA SER-ACNC: <2 U/ML (ref 0–3)

## 2022-06-22 NOTE — PROGRESS NOTES
Specialty Pharmacy Patient Management Program  Reassessment     Regla West is a 36 y.o. female with chronic migraine and enrolled in the Patient Management program offered by [facility].  A follow-up outreach was conducted, including assessment of continued therapy appropriateness, medication adherence, and side effect incidence and management for Nurtec 75mg ODT.     Changes to Insurance Coverage or Financial Support  none    Relevant Past Medical History and Comorbidities  Relevant medical history and concomitant health conditions were discussed with the patient. The patient's chart has been reviewed for relevant past medical history and comorbid health conditions and updated as necessary.   Past Medical History:   Diagnosis Date   • Anemia     Taking iron   • Disease of thyroid gland     Takes synthroid   • Hashimoto's thyroiditis    • Headache    • Hypothyroidism    • Migraine    • Pain in joint, lower leg      Social History     Socioeconomic History   • Marital status:    Tobacco Use   • Smoking status: Never Smoker   • Smokeless tobacco: Never Used   Vaping Use   • Vaping Use: Never used   Substance and Sexual Activity   • Alcohol use: Yes     Alcohol/week: 1.0 standard drink     Types: 1 Glasses of wine per week   • Drug use: No   • Sexual activity: Yes     Partners: Male       Allergies  Known allergies and reactions were discussed with the patient. The patient's chart has been reviewed for allergy information and updated as necessary.   Macrobid [nitrofurantoin monohyd macro]    Relevant Laboratory Values  No results for input(s): CMP, BMP, CBC in the last 72 hours.    Current Medication List  This medication list has been reviewed with the patient and evaluated for any interactions or necessary modifications/recommendations, and updated to include all prescription medications, OTC medications, and supplements the patient is currently taking.  This list reflects what  is contained in the patient's profile, which has also been marked as reviewed to communicate to other providers it is the most up to date version of the patient's current medication therapy.     Current Outpatient Medications:   •  azithromycin (Zithromax Z-Seamus) 250 MG tablet, Take 2 tablets by mouth on day 1, then take 1 tablet by mouth on days 2 through 5., Disp: 6 tablet, Rfl: 0  •  Cholecalciferol 25 MCG (1000 UT) chewable tablet, Chew 1 capsule., Disp: , Rfl:   •  clindamycin (CLEOCIN T) 1 % external solution, Apply topically to the affected area on face as directed 2 (two) times a day., Disp: 60 mL, Rfl: 11  •  doxycycline (ADOXA) 100 MG tablet, Take 1 tablet by mouth 2 (two) times a day., Disp: 14 tablet, Rfl: 0  •  escitalopram (Lexapro) 10 MG tablet, Take 1 tablet by mouth Daily. (Patient taking differently: Take 5 mg by mouth Daily.), Disp: 90 tablet, Rfl: 3  •  levothyroxine (SYNTHROID, LEVOTHROID) 88 MCG tablet, Take 1 tablet by mouth Daily., Disp: 90 tablet, Rfl: 3  •  lidocaine (LIDODERM) 5 %, Place 1 patch on the skin as directed by provider Daily. Remove & Discard patch within 12 hours or as directed by MD (Patient taking differently: Place 1 patch on the skin as directed by provider As Needed. Remove & Discard patch within 12 hours or as directed by MD), Disp: 30 patch, Rfl: 3  •  loratadine (CLARITIN) 10 MG tablet, Take 10 mg by mouth Daily., Disp: , Rfl:   •  melatonin 5 MG tablet tablet, Take 5 mg by mouth Every Night., Disp: , Rfl:   •  Multiple Vitamins-Minerals (HAIR SKIN AND NAILS FORMULA PO), Take  by mouth., Disp: , Rfl:   •  ondansetron (Zofran) 8 MG tablet, Take 1 tablet by mouth Every 8 (Eight) Hours As Needed for Nausea or Vomiting., Disp: 30 tablet, Rfl: 0  •  Rimegepant Sulfate (Nurtec) 75 MG tablet dispersible tablet, Take 1 tablet by mouth Daily As Needed (MIGRAINE)., Disp: 8 tablet, Rfl: 3  •  Rimegepant Sulfate (NURTEC) 75 MG tablet dispersible tablet, Place 1 tablet on the tongue  Daily As Needed for migraine., Disp: 8 tablet, Rfl: 3  •  traZODone (DESYREL) 50 MG tablet, Take 1 tablet by mouth Every Night., Disp: 30 tablet, Rfl: 2    Drug Interactions  none     Adverse Drug Reactions  • Adverse Reactions Experienced: none  • Plan for ADR Management: N/A (patient education provided, discontinue drug, pharmacist to consult provider, etc.)    Hospitalizations and Urgent Care Since Last Assessment  • Hospitalizations or Admissions: none  • ED Visits: none  • Urgent Office Visits: none     Adherence and Self-Administration  • Approximate Number of Doses Missed Since Last Assessment: none  • Ongoing or New Barriers to Patient Adherence and/or Self-Administration: none   • Methods for Supporting Patient Adherence and/or Self-Administration: N/A     Goals of Therapy  Progress Toward Meeting Patient-Identified Goals of Therapy: On Track  o New Patient-Identified Goals, If Applicable:     • Progress Toward Meeting Clinical Goals or Therapeutic Targets: On Track  o New Clinical Goals or Therapeutic Targets, If Applicable:     Quality of Life Assessment   Quality of Life related to the patient's specialty therapy was discussed with the patient. The QOL segment of this outreach has been reviewed and updated.     Quality of Life Assessment  Quality of Life Improvement Scale: No change    • Quality of Life Score: 5    Reassessment Plan & Follow-Up  1. Medication Therapy Changes: none  2. Additional Plans, Therapy Recommendations, or Therapy Problems to Be Addressed: none   3. Since patient has been on Nurtec she admits to an improvement in her migraines.  She states the nurtec works well, she is tolerating well.  Severity and duration of migraines has decreased while taking nurtec.  We will continue therapy and reach out to patient in 4 weeks.  4. Pharmacist to perform regular reassessments no more than (6) months from the previous assessment.  5. Care Coordinator to set up future refill outreaches,  coordinate prescription delivery, and escalate clinical questions to pharmacist.     Attestation  I attest that the specialty medication(s) addressed above are appropriate for the patient based on my reassessment.  If the prescribed therapy is at any point deemed not appropriate based on the current or future assessments, a consultation will be initiated with the patient's specialty care provider to determine the best course of action. The revised plan of therapy will be documented along with any additional patient education provided.     Electronically signed by Al Mejia RPH, 06/22/22, 10:29 AM EDT.

## 2022-06-22 NOTE — PROGRESS NOTES
Specialty Pharmacy Refill Coordination Note     Regla is a 36 y.o. female contacted today regarding refills of  Nurtec 75mg specialty medication(s).    Reviewed and verified with patient:         Specialty medication(s) and dose(s) confirmed: yes    Refill Questions    Flowsheet Row Most Recent Value   Changes to allergies? No   Changes to medications? No   New conditions since last clinic visit No   Unplanned office visit, urgent care, ED, or hospital admission in the last 4 weeks  No   How does patient/caregiver feel medication is working? Very good   Financial problems or insurance changes  No   If yes, describe changes in insurance or financial issues. N/A   Since the previous refill, were any specialty medication doses or scheduled injections missed or delayed?  No   If yes, please provide the amount 0   Why were doses missed? N/A   Does this patient require a clinical escalation to a pharmacist? No                     Follow-up: 21 day(s)     Marybel Varghese, Pharmacy Technician  Specialty Pharmacy Technician

## 2022-06-30 ENCOUNTER — DOCUMENTATION (OUTPATIENT)
Dept: PHARMACY | Facility: TELEHEALTH | Age: 37
End: 2022-06-30

## 2022-07-05 ENCOUNTER — DOCUMENTATION (OUTPATIENT)
Dept: PHARMACY | Facility: TELEHEALTH | Age: 37
End: 2022-07-05

## 2022-07-06 ENCOUNTER — SPECIALTY PHARMACY (OUTPATIENT)
Dept: PHARMACY | Facility: TELEHEALTH | Age: 37
End: 2022-07-06

## 2022-07-06 NOTE — PROGRESS NOTES
Specialty Pharmacy Refill Coordination Note     Regla is a 36 y.o. female contacted today regarding refills of  Nurtec 75mg ODT specialty medication(s).    Reviewed and verified with patient:         Specialty medication(s) and dose(s) confirmed: yes    Refill Questions    Flowsheet Row Most Recent Value   Changes to allergies? No   Changes to medications? No   New conditions since last clinic visit No   Unplanned office visit, urgent care, ED, or hospital admission in the last 4 weeks  No   How does patient/caregiver feel medication is working? Very good   Financial problems or insurance changes  No   If yes, describe changes in insurance or financial issues. N/A   Since the previous refill, were any specialty medication doses or scheduled injections missed or delayed?  No   If yes, please provide the amount 0   Why were doses missed? N/A   Does this patient require a clinical escalation to a pharmacist? No                Medication Adherence    Adherence tools used: directed education  Support network for adherence: family member          Follow-up: 21 day(s)     Marybel Varghese, Pharmacy Technician  Specialty Pharmacy Technician

## 2022-07-22 DIAGNOSIS — F41.1 GENERALIZED ANXIETY DISORDER: ICD-10-CM

## 2022-07-22 RX ORDER — ESCITALOPRAM OXALATE 10 MG/1
10 TABLET ORAL DAILY
Qty: 90 TABLET | Refills: 3 | Status: SHIPPED | OUTPATIENT
Start: 2022-07-22

## 2022-07-25 ENCOUNTER — TELEPHONE (OUTPATIENT)
Dept: NEUROLOGY | Facility: CLINIC | Age: 37
End: 2022-07-25

## 2022-07-25 NOTE — TELEPHONE ENCOUNTER
Caller: ABBY     Relationship: [employer]     Best call back number: [phone]    P.A RENEWAL#  [id number]    What was the call regarding: PRIOR AUTH EXPIRING     Do you require a callback: FAX OR COVER MY [employer] TO RENEW      ON AUG 23.22

## 2022-07-26 NOTE — TELEPHONE ENCOUNTER
Pharmacy Name:  [facility]    Pharmacy representative name: KATHRYN    Pharmacy representative phone number: [phone]    What medication are you calling in regards to: NURTEC    What question does the pharmacy have: THEY STATE ADDITIONAL QUESTIONS NEED TO BE ANSWERED, THEY FAXED IT OVER YESTERDAY 7-25-22 @4:30PM, CAN ALSO BE ANSWERED VERBALLY AT THE NUMBER ABOVE.

## 2022-07-27 NOTE — TELEPHONE ENCOUNTER
Pharmacy Name:  [facility]     Pharmacy representative name: TED     Pharmacy representative phone number: [phone] FAX:[phone]     What medication are you calling in regards to: NURTEC     What question does the pharmacy have: SHE STATES THAT [facility] REP WAS TALKING TO SOME ONE IN THE OFFICE & ASKING THE CLINICAL QUESTIONS WHEN THEY GOT DISCONNECTED. SHE IS ASKING IF THE OFFICE RECEIVED A FAXED FORM THAT HAS THE CLINICAL QUESTIONS ON IT& IF IT COULD BE FILLED OUT & FAXED BACK TO THEM.

## 2022-07-28 NOTE — TELEPHONE ENCOUNTER
GERRY FROM PHARMACY IS CALLING AGAIN THIS IS THE 3RD CALL AND THEY WILL BE REMOVING COMPLETELY FOR LACK OF INFORMATION , AND RX WILL BE DENIED. PLEASE CALL ASAP IF WANTING TO KEEP NURTEC AS PT PRESCRIPTION     PHONE [phone]    FAX # [phone]      PRIOR  AUTH  #[id number]

## 2022-08-02 ENCOUNTER — DOCUMENTATION (OUTPATIENT)
Dept: PHARMACY | Facility: TELEHEALTH | Age: 37
End: 2022-08-02

## 2022-08-02 RX ORDER — RIMEGEPANT SULFATE 75 MG/75MG
75 TABLET, ORALLY DISINTEGRATING ORAL DAILY PRN
Qty: 8 TABLET | Refills: 3 | Status: SHIPPED | OUTPATIENT
Start: 2022-08-02 | End: 2022-10-26 | Stop reason: SDUPTHER

## 2022-08-03 ENCOUNTER — SPECIALTY PHARMACY (OUTPATIENT)
Dept: PHARMACY | Facility: TELEHEALTH | Age: 37
End: 2022-08-03

## 2022-08-03 NOTE — PROGRESS NOTES
Specialty Pharmacy Patient Management Program  Call Center Refill Outreach      Regla is a 36 y.o. female contacted today regarding refills of her medication(s).    Specialty medication(s) and dose(s) confirmed: Nurtec 75mg    Refill Questions    Flowsheet Row Most Recent Value   Changes to allergies? No   Changes to medications? No   New conditions since last clinic visit No   Unplanned office visit, urgent care, ED, or hospital admission in the last 4 weeks  No   How does patient/caregiver feel medication is working? Very good   Financial problems or insurance changes  No   If yes, describe changes in insurance or financial issues. N/A   Since the previous refill, were any specialty medication doses or scheduled injections missed or delayed?  No   If yes, please provide the amount 0   Why were doses missed? N/A   Does this patient require a clinical escalation to a pharmacist? No              Medication Adherence    Adherence tools used: directed education  Support network for adherence: family member            Follow-up: 21 Days     Marybel Varghese CPhT  Care Coordinator, Specialty Pharmacy Call Center  [phone]  8/3/2022  09:38 EDT

## 2022-08-24 ENCOUNTER — DOCUMENTATION (OUTPATIENT)
Dept: PHARMACY | Facility: TELEHEALTH | Age: 37
End: 2022-08-24

## 2022-08-24 ENCOUNTER — SPECIALTY PHARMACY (OUTPATIENT)
Dept: PHARMACY | Facility: TELEHEALTH | Age: 37
End: 2022-08-24

## 2022-09-06 ENCOUNTER — SPECIALTY PHARMACY (OUTPATIENT)
Dept: PHARMACY | Facility: TELEHEALTH | Age: 37
End: 2022-09-06

## 2022-09-06 NOTE — PROGRESS NOTES
Specialty Pharmacy Patient Management Program  Call Center Refill Outreach      Regla is a 36 y.o. female contacted today regarding refills of her medication(s).    Specialty medication(s) and dose(s) confirmed: Nurtec 75mg ODT      Refill Questions    Flowsheet Row Most Recent Value   Changes to allergies? No   Changes to medications? No   New conditions since last clinic visit No   Unplanned office visit, urgent care, ED, or hospital admission in the last 4 weeks  No   How does patient/caregiver feel medication is working? Very good   Financial problems or insurance changes  No   If yes, describe changes in insurance or financial issues. N/A   Since the previous refill, were any specialty medication doses or scheduled injections missed or delayed?  No   If yes, please provide the amount 0   Why were doses missed? N/A   Does this patient require a clinical escalation to a pharmacist? No              Medication Adherence    Adherence tools used: directed education  Support network for adherence: family member            Follow-up: 21 Days     Marybel Varghese CPhT  Care Coordinator, Specialty Pharmacy Call Center  [phone]  9/6/2022  08:56 EDT

## 2022-09-08 ENCOUNTER — CLINICAL SUPPORT (OUTPATIENT)
Dept: NEUROLOGY | Facility: CLINIC | Age: 37
End: 2022-09-08

## 2022-09-08 DIAGNOSIS — G44.229 CHRONIC TENSION-TYPE HEADACHE, NOT INTRACTABLE: Primary | ICD-10-CM

## 2022-09-08 DIAGNOSIS — G43.119 INTRACTABLE MIGRAINE WITH AURA WITHOUT STATUS MIGRAINOSUS: ICD-10-CM

## 2022-09-08 PROCEDURE — 20553 NJX 1/MLT TRIGGER POINTS 3/>: CPT | Performed by: PSYCHIATRY & NEUROLOGY

## 2022-09-08 NOTE — PROGRESS NOTES
CC: Cervicogenic headaches.  Procedure Note:  1.         Patient was positioned comfortably  2.         Before injections are started, 10 Trigger Points sites are identified throughout the bilateral upper trapezius muscle, levator scapulae, and erector spinae muscles.    3.         Overlying skin area was cleaned with Alcohol swab                                                                                                              4.         Before injection, trigger points sites were palpated for local twitch and referred pain to confirms placement                         5.         Local anesthetic bupivacaine-total of 10 mL is used.  6.         30 gauge ½ inch needle was utilized to ensure patient comfort          7.         I started with the most tender spot in above mentioned Trigger Points   1.   Localize most tender spot within taut muscle-fibers  2.   Fix tender spot between fingers (1-2 cm in size)   1.   Prevents from rolling away from needle  2.   Controls subcutaneous bleeding  3.   Before injection, patient was instructed of possible pain on injection  4.   Direct needle at 30 degree angle off skin   8.         Insert needle into skin 1-2 cm from Trigger Point   9.         Advance needle into Trigger Point   10.       Use 1cc anesthetic at each Trigger Points identified in step #2  11.       Redirect needle and reinject                                                                                              1.   Withdraw needle to subcutaneous tissue  2.   Redirect needle into adjacent tender areas  3.   Repeat until local twitch or tautness resolves  12.   After each of the 10 injections I held direct pressure at injection site for 2 minutes to prevents hematoma formation  13.   The same procedure was repeated for other Tender Points located in the upper trapezius muscle, levator scapulae and erector spinae bilaterally  14.   Patient was instructed to gently stretch injected areas to full  active range of motion in all directions and was instructed to repeat range of motion three times after injection  15.   Post-Procedure patient was instructed to avoid over-using injected area for 3-4 days   1.   Maintain active range of motion of injected muscle  2.   Patient applies ice to injected areas for a few hours  3.   Anticipate post-injection soreness for 3-4 days  There was no evidence of complications from injection was noted such as local Skin Infection  at injection site or local hematoma at injection site    Note: She is reporting worsening in headache frequency and intensity as she had to miss her last scheduled appointment in July 2022.  I am hoping that with regular trigger point injection schedule, headache frequency and intensity will reduce.  She is using Nurtec as needed as an abortive treatment and seems to be working well.  I will see her back in 12 weeks for next injection.

## 2022-09-15 ENCOUNTER — OFFICE VISIT (OUTPATIENT)
Dept: OBSTETRICS AND GYNECOLOGY | Facility: CLINIC | Age: 37
End: 2022-09-15

## 2022-09-15 VITALS
SYSTOLIC BLOOD PRESSURE: 102 MMHG | BODY MASS INDEX: 23.78 KG/M2 | HEIGHT: 63 IN | DIASTOLIC BLOOD PRESSURE: 64 MMHG | WEIGHT: 134.2 LBS

## 2022-09-15 DIAGNOSIS — Z12.39 ENCOUNTER FOR BREAST CANCER SCREENING USING NON-MAMMOGRAM MODALITY: ICD-10-CM

## 2022-09-15 DIAGNOSIS — Z01.419 WOMEN'S ANNUAL ROUTINE GYNECOLOGICAL EXAMINATION: Primary | ICD-10-CM

## 2022-09-15 DIAGNOSIS — N39.0 RECURRENT UTI: ICD-10-CM

## 2022-09-15 PROCEDURE — 99395 PREV VISIT EST AGE 18-39: CPT | Performed by: OBSTETRICS & GYNECOLOGY

## 2022-09-15 RX ORDER — CEPHALEXIN 250 MG/1
250 CAPSULE ORAL 4 TIMES DAILY
Qty: 30 CAPSULE | Refills: 2 | Status: SHIPPED | OUTPATIENT
Start: 2022-09-15 | End: 2022-11-22

## 2022-09-15 NOTE — PROGRESS NOTES
Gynecologic Annual Exam Note        Gynecologic Exam        Subjective     HPI  Regla West is a 36 y.o.  female who presents for annual well woman exam as a established patient. There were no changes to her medical or surgical history since her last visit.. Patient reports problems with: frequent UTI after IC. Patient is asking if she can take any pyophylactic to prevent them. Patient states that she urinates and showers after IC but this is not helping her s/s. Patient states that she had a UTI 3 weeks ago. Patient denies urinary s/s today. Patient's last menstrual period was 2022.. Her periods are regular every 25-35 days, lasting 7-9 days. The flow is light for 2 days, then flow is heavy for 2 days, and then spotting until the end of her menstrual. Patient reports changing a pad/tampon every 2 hours on her heaviest day of flow. Dysmenorrhea:mild, occurring first 2-4 days of flow. Partner Status: Marital Status: .  She is sexually active. She has not had new partners. STD testing recommendations have been explained to the patient and she does not desire STD testing.    Additional OB/GYN History   Current contraception: contraceptive methods: Tubal ligation  Desires to: do not start contraception  Thromboembolic Disease: none  Age of menarche: 11    History of STD: no    Last Pap : 2021. Results: negative. HPV: not done. Last HPV in - negative.  Last Completed Pap Smear          PAP SMEAR (Every 3 Years) Next due on 2021  SCANNED - PAP SMEAR    2020  Done - neg, neg HPV                 History of abnormal Pap smear: yes - no interventions, repeats normal per patient  Gardasil status:uncertain if she received the vaccine  Family history of uterine, colon, breast, or ovarian cancer: no  Performs monthly Self-Breast Exam: no  Exercises Regularly:yes  Feelings of Anxiety or Depression: no  Tobacco Usage?: No       Current Outpatient Medications:   •   Calcium-Phosphorus-Vitamin D (CALCIUM/VITAMIN D3/ADULT GUMMY PO), Take  by mouth., Disp: , Rfl:   •  clindamycin (CLEOCIN T) 1 % external solution, Apply topically to the affected area on face as directed 2 (two) times a day., Disp: 60 mL, Rfl: 11  •  escitalopram (Lexapro) 10 MG tablet, Take 1 tablet by mouth Daily., Disp: 90 tablet, Rfl: 3  •  levothyroxine (SYNTHROID, LEVOTHROID) 88 MCG tablet, Take 1 tablet by mouth Daily., Disp: 90 tablet, Rfl: 3  •  lidocaine (LIDODERM) 5 %, Place 1 patch on the skin as directed by provider Daily. Remove & Discard patch within 12 hours or as directed by MD (Patient taking differently: Place 1 patch on the skin as directed by provider As Needed. Remove & Discard patch within 12 hours or as directed by MD), Disp: 30 patch, Rfl: 3  •  loratadine (CLARITIN) 10 MG tablet, Take 10 mg by mouth Daily., Disp: , Rfl:   •  melatonin 5 MG tablet tablet, Take 5 mg by mouth Every Night., Disp: , Rfl:   •  Multiple Vitamins-Minerals (HAIR SKIN AND NAILS FORMULA PO), Take  by mouth., Disp: , Rfl:   •  ondansetron (Zofran) 8 MG tablet, Take 1 tablet by mouth Every 8 (Eight) Hours As Needed for Nausea or Vomiting., Disp: 30 tablet, Rfl: 0  •  Rimegepant Sulfate (Nurtec) 75 MG tablet dispersible tablet, Take 1 tablet by mouth Daily As Needed (MIGRAINE)., Disp: 8 tablet, Rfl: 3  •  Rimegepant Sulfate (Nurtec) 75 MG tablet dispersible tablet, Place 1 tablet on the tongue Daily As Needed for migraine., Disp: 8 tablet, Rfl: 3  •  traZODone (DESYREL) 50 MG tablet, Take 1 tablet by mouth Every Night., Disp: 30 tablet, Rfl: 2     Patient denies the need for medication refills today.    OB History        2    Para   2    Term   2            AB        Living   2       SAB        IAB        Ectopic        Molar        Multiple   0    Live Births   2                Health Maintenance   Topic Date Due   • ANNUAL PHYSICAL  Never done   • TDAP/TD VACCINES (1 - Tdap) Never done   • HEPATITIS C  "SCREENING  Never done   • LIPID PANEL  2020   • Annual Gynecologic Pelvic and Breast Exam  2022   • INFLUENZA VACCINE  10/01/2022   • PAP SMEAR  2024   • COVID-19 Vaccine  Completed   • Pneumococcal Vaccine 0-64  Aged Out       Past Medical History:   Diagnosis Date   • Anemia     Taking iron   • Hashimoto's thyroiditis    • Headache    • Hypothyroidism    • Migraine    • Pain in joint, lower leg         Past Surgical History:   Procedure Laterality Date   •  SECTION WITH TUBAL Bilateral 7/3/2018    Procedure:  SECTION PRIMARY WITH TUBAL * 39 WKS *;  Surgeon: Cinda Greenberg MD;  Location: [facility] LABOR DELIVERY;  Service: Obstetrics/Gynecology   • SEPTOPLASTY         • WISDOM TOOTH EXTRACTION         The additional following portions of the patient's history were reviewed and updated as appropriate: allergies, current medications, past family history, past medical history, past social history, past surgical history and problem list.    Review of Systems   Constitutional: Negative.    HENT: Negative.    Eyes: Negative.    Respiratory: Negative.    Cardiovascular: Negative.    Gastrointestinal: Negative.    Endocrine: Negative.    Genitourinary: Negative.    Musculoskeletal: Negative.    Skin: Negative.    Allergic/Immunologic: Negative.    Neurological: Negative.    Hematological: Negative.    Psychiatric/Behavioral: Negative.          I have reviewed and agree with the HPI, ROS, and historical information as entered above. Genoveva Sanders MD        Objective   /64   Ht 160 cm (63\")   Wt 60.9 kg (134 lb 3.2 oz)   LMP 2022   BMI 23.77 kg/m²     Physical Exam  Vitals and nursing note reviewed. Exam conducted with a chaperone present.   Constitutional:       Appearance: She is well-developed.   HENT:      Head: Normocephalic and atraumatic.   Neck:      Thyroid: No thyroid mass or thyromegaly.   Cardiovascular:      Rate and Rhythm: Normal rate and regular rhythm.      " Heart sounds: No murmur heard.  Pulmonary:      Effort: Pulmonary effort is normal. No retractions.      Breath sounds: Normal breath sounds. No wheezing, rhonchi or rales.   Chest:      Chest wall: No mass or tenderness.   Breasts:      Right: Normal. No mass, nipple discharge, skin change or tenderness.      Left: Normal. No mass, nipple discharge, skin change or tenderness.       Abdominal:      General: Bowel sounds are normal.      Palpations: Abdomen is soft. Abdomen is not rigid. There is no mass.      Tenderness: There is no abdominal tenderness. There is no guarding.      Hernia: No hernia is present. There is no hernia in the left inguinal area.   Genitourinary:     Labia:         Right: No rash, tenderness or lesion.         Left: No rash, tenderness or lesion.       Vagina: Normal. No vaginal discharge or lesions.      Cervix: No cervical motion tenderness, discharge, lesion or cervical bleeding.      Uterus: Normal. Not enlarged, not fixed and not tender.       Adnexa:         Right: No mass or tenderness.          Left: No mass or tenderness.        Rectum: No external hemorrhoid.   Musculoskeletal:      Cervical back: Normal range of motion. No muscular tenderness.   Neurological:      Mental Status: She is alert and oriented to person, place, and time.   Psychiatric:         Behavior: Behavior normal.            Assessment and Plan    Problem List Items Addressed This Visit    None     Visit Diagnoses     Women's annual routine gynecological examination    -  Primary    Encounter for breast cancer screening using non-mammogram modality        Recurrent UTI              1. GYN annual well woman exam.   2. Reviewed pap guidelines.   3. Recommended use of Vitamin D replacement and getting adequate calcium in her diet. (1500mg)  4. Reviewed monthly self breast exams.  Instructed to call with lumps, pain, or breast discharge.    5. Reviewed HPV guidelines.   6. Recurrent UTI - this is only with  intercourse.  Will try prophylactic antibiotics and if persist will need culture.  Return in about 1 year (around 9/15/2023), or if symptoms worsen or fail to improve.      Genoveva Sanders MD  09/15/2022

## 2022-09-26 RX ORDER — MEDROXYPROGESTERONE ACETATE 5 MG/1
5 TABLET ORAL DAILY
Qty: 10 TABLET | Refills: 0 | Status: SHIPPED | OUTPATIENT
Start: 2022-09-26 | End: 2023-01-30

## 2022-09-26 RX ORDER — TRAZODONE HYDROCHLORIDE 50 MG/1
50 TABLET ORAL NIGHTLY
Qty: 30 TABLET | Refills: 2 | Status: SHIPPED | OUTPATIENT
Start: 2022-09-26 | End: 2022-12-09

## 2022-10-03 ENCOUNTER — SPECIALTY PHARMACY (OUTPATIENT)
Dept: PHARMACY | Facility: TELEHEALTH | Age: 37
End: 2022-10-03

## 2022-10-11 ENCOUNTER — SPECIALTY PHARMACY (OUTPATIENT)
Dept: PHARMACY | Facility: HOSPITAL | Age: 37
End: 2022-10-11

## 2022-10-11 DIAGNOSIS — E03.9 ACQUIRED HYPOTHYROIDISM: Primary | ICD-10-CM

## 2022-10-11 NOTE — PROGRESS NOTES
Specialty Pharmacy Patient Management Program  Call Center Refill Outreach      Regla is a 36 y.o. female contacted today regarding refills of her medication(s).    Specialty medication(s) and dose(s) confirmed: Nurtec  Other medications being refilled: N/A    Refill Questions    Flowsheet Row Most Recent Value   Changes to allergies? No   Changes to medications? No   New conditions since last clinic visit No   Unplanned office visit, urgent care, ED, or hospital admission in the last 4 weeks  No   How does patient/caregiver feel medication is working? Very good   Financial problems or insurance changes  No   If yes, describe changes in insurance or financial issues. N/A   Since the previous refill, were any specialty medication doses or scheduled injections missed or delayed?  No   If yes, please provide the amount N/A   Why were doses missed? N/A   Does this patient require a clinical escalation to a pharmacist? No              Medication Adherence    Adherence tools used: directed education  Support network for adherence: family member            Follow-up: 21 Days     Alec De La Torre CPhT  Care Coordinator, Specialty Pharmacy Call Center  10/11/2022  10:37 EDT

## 2022-10-18 ENCOUNTER — LAB (OUTPATIENT)
Dept: LAB | Facility: HOSPITAL | Age: 37
End: 2022-10-18

## 2022-10-18 ENCOUNTER — DOCUMENTATION (OUTPATIENT)
Dept: PHARMACY | Facility: TELEHEALTH | Age: 37
End: 2022-10-18

## 2022-10-18 DIAGNOSIS — E03.9 ACQUIRED HYPOTHYROIDISM: ICD-10-CM

## 2022-10-18 LAB
T4 FREE SERPL-MCNC: 1.47 NG/DL (ref 0.93–1.7)
TSH SERPL DL<=0.05 MIU/L-ACNC: 2.94 UIU/ML (ref 0.27–4.2)

## 2022-10-18 PROCEDURE — 84439 ASSAY OF FREE THYROXINE: CPT

## 2022-10-18 PROCEDURE — 36415 COLL VENOUS BLD VENIPUNCTURE: CPT

## 2022-10-18 PROCEDURE — 84443 ASSAY THYROID STIM HORMONE: CPT

## 2022-10-20 ENCOUNTER — OFFICE VISIT (OUTPATIENT)
Dept: ENDOCRINOLOGY | Facility: CLINIC | Age: 37
End: 2022-10-20

## 2022-10-20 VITALS
DIASTOLIC BLOOD PRESSURE: 65 MMHG | OXYGEN SATURATION: 98 % | SYSTOLIC BLOOD PRESSURE: 110 MMHG | HEIGHT: 63 IN | WEIGHT: 135 LBS | BODY MASS INDEX: 23.92 KG/M2 | HEART RATE: 83 BPM

## 2022-10-20 DIAGNOSIS — E03.9 ACQUIRED HYPOTHYROIDISM: Primary | ICD-10-CM

## 2022-10-20 DIAGNOSIS — E06.3 HASHIMOTO'S THYROIDITIS: ICD-10-CM

## 2022-10-20 PROCEDURE — 99213 OFFICE O/P EST LOW 20 MIN: CPT | Performed by: INTERNAL MEDICINE

## 2022-10-20 RX ORDER — LEVOTHYROXINE SODIUM 88 UG/1
88 TABLET ORAL DAILY
Qty: 90 TABLET | Refills: 3 | Status: SHIPPED | OUTPATIENT
Start: 2022-10-20

## 2022-10-20 NOTE — PROGRESS NOTES
"     Office Note      Date: 10/20/2022  Patient Name: Regla West  MRN: [id number]  : 1985    Chief Complaint   Patient presents with   • Hypothyroidism       History of Present Illness:   Regla eWst is a 36 y.o. female who presents for Hypothyroidism    She remains on T4 88mcg qd. She is taking this correctly. She isn't taking any interfering meds concurrently. She hasn't noted any change in the size of her neck. She denies any compressive sxs. She denies any sxs of hypo- or hyperthyroidism at this time, aside from some fatigue.     She had labs done recently.  TSH was 2.94.    Subjective      Review of Systems:   Review of Systems   Constitutional: Positive for fatigue.   Cardiovascular: Negative.    Gastrointestinal: Negative.    Endocrine: Negative.        The following portions of the patient's history were reviewed and updated as appropriate: allergies, current medications, past family history, past medical history, past social history, past surgical history and problem list.    Objective     Visit Vitals  /65   Pulse 83   Ht 160 cm (63\")   Wt 61.2 kg (135 lb)   SpO2 98%   BMI 23.91 kg/m²       Physical Exam:  Physical Exam  Constitutional:       Appearance: Normal appearance.   Neck:      Thyroid: No thyroid mass, thyromegaly or thyroid tenderness.   Lymphadenopathy:      Cervical: No cervical adenopathy.   Neurological:      Mental Status: She is alert.         Labs:    TSH  No results found for: TSHBASE     Free T4  Free T4   Date Value Ref Range Status   10/18/2022 1.47 0.93 - 1.70 ng/dL Final       T3  No results found for: D6ZLDWX      TPO  Thyroid Peroxidase Antibody   Date Value Ref Range Status   2020 228 (H) 0 - 34 IU/mL Final       TG AB  Thyroglobulin Ab   Date Value Ref Range Status   2020 4.6 (H) 0.0 - 0.9 IU/mL Final     Comment:     Thyroglobulin Antibody measured by [facility] Method[facility]       TG  No results found for: THYROGLB    CBC w/DIFF  Lab " Results   Component Value Date    WBC 5.80 06/21/2022    RBC 4.89 06/21/2022    HGB 14.0 06/21/2022    HCT 45.4 06/21/2022    MCV 92.8 06/21/2022    MCH 28.7 06/21/2022    MCHC 31.0 (L) 06/21/2022    RDW 14.7 06/21/2022    RDWSD 44.4 11/16/2018    MPV 7.4 06/21/2022     06/21/2022    NEUTRORELPCT 62.3 06/21/2022    LYMPHORELPCT 33.2 06/21/2022    MONORELPCT 4.5 (L) 06/21/2022    EOSRELPCT 5.9 (H) 11/16/2018    BASORELPCT 0.4 11/16/2018    AUTOIGPER 0.2 11/16/2018    NEUTROABS 3.60 06/21/2022    LYMPHSABS 1.90 06/21/2022    MONOSABS 0.30 06/21/2022    EOSABS 0.32 (H) 11/16/2018    BASOSABS 0.02 11/16/2018    AUTOIGNUM 0.01 11/16/2018           Assessment / Plan      Assessment & Plan:  Diagnoses and all orders for this visit:    1. Acquired hypothyroidism (Primary)  Assessment & Plan:  TSH okay.  Continue current T4 dose.       2. Hashimoto's thyroiditis  Assessment & Plan:  No goiter on exam.  Continue with periodic neck exams.      Other orders  -     levothyroxine (SYNTHROID, LEVOTHROID) 88 MCG tablet; Take 1 tablet by mouth Daily.  Dispense: 90 tablet; Refill: 3      Return in about 1 year (around 10/20/2023) for Recheck with TSH.    Johnny Jones MD   10/20/2022

## 2022-10-24 ENCOUNTER — SPECIALTY PHARMACY (OUTPATIENT)
Dept: PHARMACY | Facility: TELEHEALTH | Age: 37
End: 2022-10-24

## 2022-10-25 ENCOUNTER — SPECIALTY PHARMACY (OUTPATIENT)
Dept: PHARMACY | Facility: TELEHEALTH | Age: 37
End: 2022-10-25

## 2022-10-25 NOTE — PROGRESS NOTES
Specialty Pharmacy Patient Management Program  Call Center Refill Outreach      Regla is a 36 y.o. female contacted today regarding refills of her medication(s).    Specialty medication(s) and dose(s) confirmed: Nurtec  Other medications being refilled: N/A    Refill Questions    Flowsheet Row Most Recent Value   Changes to allergies? No   Changes to medications? No   New conditions since last clinic visit No   Unplanned office visit, urgent care, ED, or hospital admission in the last 4 weeks  No   How does patient/caregiver feel medication is working? Very good   Financial problems or insurance changes  No   If yes, describe changes in insurance or financial issues. N/A   Since the previous refill, were any specialty medication doses or scheduled injections missed or delayed?  No   If yes, please provide the amount N/A   Why were doses missed? N/A   Does this patient require a clinical escalation to a pharmacist? No              Medication Adherence    Adherence tools used: directed education  Support network for adherence: family member            Follow-up: 15 days     Alec D eLa Torre CPhT  Care Coordinator, Specialty Pharmacy Call Center  10/25/2022  08:37 EDT

## 2022-10-26 ENCOUNTER — OFFICE VISIT (OUTPATIENT)
Dept: FAMILY MEDICINE CLINIC | Facility: CLINIC | Age: 37
End: 2022-10-26

## 2022-10-26 ENCOUNTER — HOSPITAL ENCOUNTER (OUTPATIENT)
Dept: GENERAL RADIOLOGY | Facility: HOSPITAL | Age: 37
Discharge: HOME | End: 2022-10-26
Admitting: PHYSICIAN ASSISTANT

## 2022-10-26 VITALS
OXYGEN SATURATION: 99 % | HEIGHT: 63 IN | WEIGHT: 135.2 LBS | SYSTOLIC BLOOD PRESSURE: 106 MMHG | RESPIRATION RATE: 14 BRPM | TEMPERATURE: 97 F | BODY MASS INDEX: 23.96 KG/M2 | HEART RATE: 89 BPM | DIASTOLIC BLOOD PRESSURE: 68 MMHG

## 2022-10-26 DIAGNOSIS — G89.29 CHRONIC SI JOINT PAIN: ICD-10-CM

## 2022-10-26 DIAGNOSIS — M53.3 COCCYX PAIN: ICD-10-CM

## 2022-10-26 DIAGNOSIS — M54.32 SCIATICA OF LEFT SIDE: Primary | ICD-10-CM

## 2022-10-26 DIAGNOSIS — M53.3 CHRONIC SI JOINT PAIN: ICD-10-CM

## 2022-10-26 PROCEDURE — 72220 X-RAY EXAM SACRUM TAILBONE: CPT

## 2022-10-26 PROCEDURE — 99213 OFFICE O/P EST LOW 20 MIN: CPT | Performed by: PHYSICIAN ASSISTANT

## 2022-10-26 RX ORDER — GABAPENTIN 300 MG/1
300 CAPSULE ORAL NIGHTLY
Qty: 10 CAPSULE | Refills: 0 | Status: SHIPPED | OUTPATIENT
Start: 2022-10-26 | End: 2023-03-10

## 2022-10-26 RX ORDER — RIMEGEPANT SULFATE 75 MG/75MG
75 TABLET, ORALLY DISINTEGRATING ORAL DAILY PRN
Qty: 8 TABLET | Refills: 3 | Status: SHIPPED | OUTPATIENT
Start: 2022-10-26

## 2022-10-26 NOTE — PROGRESS NOTES
Subjective   Regla West is a 36 y.o. female  Hip Pain (Lt hip with sciatica ) and Tailbone Pain (/)      History of Present Illness     Zeinab West is a pleasant female who presents today for low back pain/hip pain sciatica. She reports she has been having pain in her coccyx for months and is unable to sit for longer than 10 minutes. She states she has a seat in her chair and even when she lays down at night, she has to intentionally push her buttocks. She denies any falls. 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 She states she is now having sciatica down the back of her leg. The patient reports the pain is worse when she exercises, but she is still exercising. She states she thinks it is repetitive overuse. She reports she was running a lot on the treadmill, but now she can hardly do it because it hurts so bad afterwards. She states she has been using a pillow to sit on and also sleeps on a heating pad every night. She reports when sitting in a chair she elevates her feet because she is short. She states she also thinks she hurt her hamstring. The patient reports she has tried a foam roller,. She states she has gotten massages, but it has not helped at all. She reports she has tried Lidoderm patches. She states the pain is bothering her all day and now it is waking her up at night. She reports she does not take ibuprofen all the time because she is scared of her kidneys. She states she has not tried anything for the pain.    The patient reports having heel pain. She states she has to sleep with her leg in a certain position because her heel hurts.    The following portions of the patient's history were reviewed and updated as appropriate: allergies, current medications, past social history and problem list    Review of Systems   Constitutional: Negative for appetite change,  diaphoresis, fatigue and unexpected weight change.   Eyes: Negative for visual disturbance.   Respiratory: Negative for cough, chest tightness and shortness of breath.    Cardiovascular: Negative for chest pain, palpitations and leg swelling.   Gastrointestinal: Negative for diarrhea, nausea and vomiting.   Endocrine: Negative for polydipsia, polyphagia and polyuria.   Musculoskeletal: Positive for arthralgias and back pain.   Skin: Negative for color change and rash.   Neurological: Positive for numbness. Negative for dizziness, syncope, weakness, light-headedness and headaches.       Objective     Vitals:    10/26/22 1322   BP: 106/68   Pulse: 89   Resp: 14   Temp: 97 °F (36.1 °C)   SpO2: 99%       Physical Exam  Vitals and nursing note reviewed.   Constitutional:       General: She is not in acute distress.     Appearance: Normal appearance. She is well-developed. She is not ill-appearing, toxic-appearing or diaphoretic.   Neck:      Thyroid: No thyromegaly.      Vascular: No carotid bruit or JVD.   Cardiovascular:      Rate and Rhythm: Normal rate and regular rhythm.      Pulses: Normal pulses.      Heart sounds: Normal heart sounds. No murmur heard.  Pulmonary:      Effort: Pulmonary effort is normal. No respiratory distress.      Breath sounds: Normal breath sounds.   Abdominal:      Palpations: Abdomen is soft. There is no mass.      Tenderness: There is no abdominal tenderness.   Musculoskeletal:      Cervical back: Neck supple.      Lumbar back: Tenderness present.        Back:       Comments: Full range of motion both right and left hips. Tenderness on the sciatic notch on the left side. Internal and external rotation of hips within normal limits.     Lymphadenopathy:      Cervical: No cervical adenopathy.   Skin:     General: Skin is warm and dry.   Neurological:      Mental Status: She is alert.      Sensory: No sensory deficit.         Assessment & Plan     Diagnoses and all orders for this visit:    1.  Sciatica of left side (Primary)  -     Ambulatory Referral to Physical Therapy Evaluate and treat  -     gabapentin (NEURONTIN) 300 MG capsule; Take 1 capsule by mouth Every Night.  Dispense: 10 capsule; Refill: 0    2. Coccyx pain  -     XR sacrum and coccyx; Future  -     Ambulatory Referral to Physical Therapy Evaluate and treat  -     gabapentin (NEURONTIN) 300 MG capsule; Take 1 capsule by mouth Every Night.  Dispense: 10 capsule; Refill: 0    3. Chronic SI joint pain  -     Ambulatory Referral to Physical Therapy Evaluate and treat  -     gabapentin (NEURONTIN) 300 MG capsule; Take 1 capsule by mouth Every Night.  Dispense: 10 capsule; Refill: 0    If no improvement with symptoms we will do MRI of L-spine    I spent 15 minutes in patient care: Reviewing records prior to the visit, examining the patient, entering orders and documentation    Part of this note may be an electronic transcription/translation of spoken language to printed text using the Dragon Dictation System.      Transcribed from ambient dictation for YANN Diana by Tyesha Vera.  10/26/22   14:28 EDT    Patient or patient representative verbalized consent to the visit recording.  I have personally performed the services described in this document as transcribed by the above individual, and it is both accurate and complete.

## 2022-10-28 DIAGNOSIS — M54.32 SCIATICA OF LEFT SIDE: Primary | ICD-10-CM

## 2022-10-28 RX ORDER — GABAPENTIN 100 MG/1
100 CAPSULE ORAL 3 TIMES DAILY
Qty: 10 CAPSULE | Refills: 0 | Status: SHIPPED | OUTPATIENT
Start: 2022-10-28 | End: 2022-10-28 | Stop reason: SDUPTHER

## 2022-10-28 RX ORDER — GABAPENTIN 100 MG/1
100 CAPSULE ORAL NIGHTLY
Qty: 10 CAPSULE | Refills: 0 | Status: SHIPPED | OUTPATIENT
Start: 2022-10-28 | End: 2023-03-10

## 2022-11-17 RX ORDER — OSELTAMIVIR PHOSPHATE 75 MG/1
75 CAPSULE ORAL 2 TIMES DAILY
Qty: 10 CAPSULE | Refills: 0 | Status: SHIPPED | OUTPATIENT
Start: 2022-11-17 | End: 2023-01-30

## 2022-11-22 RX ORDER — AMOXICILLIN AND CLAVULANATE POTASSIUM 875; 125 MG/1; MG/1
1 TABLET, FILM COATED ORAL 2 TIMES DAILY
Qty: 20 TABLET | Refills: 0 | Status: SHIPPED | OUTPATIENT
Start: 2022-11-22 | End: 2023-01-30

## 2022-12-01 ENCOUNTER — SPECIALTY PHARMACY (OUTPATIENT)
Dept: PHARMACY | Facility: TELEHEALTH | Age: 37
End: 2022-12-01

## 2022-12-01 NOTE — PROGRESS NOTES
Specialty Pharmacy Patient Management Program  Call Center Refill Outreach      Regla is a 36 y.o. female contacted today regarding refills of her medication(s).    Specialty medication(s) and dose(s) confirmed: Nurtec  Other medications being refilled: N/A    Refill Questions    Flowsheet Row Most Recent Value   Changes to allergies? No   Changes to medications? No   New conditions since last clinic visit No   Unplanned office visit, urgent care, ED, or hospital admission in the last 4 weeks  No   How does patient/caregiver feel medication is working? Very good   Financial problems or insurance changes  No   If yes, describe changes in insurance or financial issues. N/A   Since the previous refill, were any specialty medication doses or scheduled injections missed or delayed?  No   If yes, please provide the amount N/A   Why were doses missed? N/A   Does this patient require a clinical escalation to a pharmacist? No              Medication Adherence    Adherence tools used: directed education  Support network for adherence: family member            Follow-up: 21 days     Alec De La Torre CPhT  Care Coordinator, Specialty Pharmacy Call Center  12/1/2022  12:15 EST

## 2022-12-01 NOTE — PROGRESS NOTES
Specialty Pharmacy Patient Management Program  Reassessment     Regla West is a 36 y.o. female with chronic migraine and enrolled in the Patient Management program offered by [facility].  A follow-up outreach was conducted, including assessment of continued therapy appropriateness, medication adherence, and side effect incidence and management for Nurtec 75mg ODT.     Changes to Insurance Coverage or Financial Support  none    Relevant Past Medical History and Comorbidities  Relevant medical history and concomitant health conditions were discussed with the patient. The patient's chart has been reviewed for relevant past medical history and comorbid health conditions and updated as necessary.   Past Medical History:   Diagnosis Date   • Anemia     Taking iron   • Hashimoto's thyroiditis    • Headache    • Hypothyroidism    • Migraine    • Pain in joint, lower leg      Social History     Socioeconomic History   • Marital status:    Tobacco Use   • Smoking status: Never   • Smokeless tobacco: Never   Vaping Use   • Vaping Use: Never used   Substance and Sexual Activity   • Alcohol use: Not Currently     Comment: socially   • Drug use: No   • Sexual activity: Yes     Partners: Male     Birth control/protection: Tubal ligation       Allergies  Known allergies and reactions were discussed with the patient. The patient's chart has been reviewed for allergy information and updated as necessary.   Macrobid [nitrofurantoin monohyd macro]    Relevant Laboratory Values  No results for input(s): CMP, BMP, CBC in the last 72 hours.    Current Medication List  This medication list has been reviewed with the patient and evaluated for any interactions or necessary modifications/recommendations, and updated to include all prescription medications, OTC medications, and supplements the patient is currently taking.  This list reflects what is contained in the patient's profile, which has also been  marked as reviewed to communicate to other providers it is the most up to date version of the patient's current medication therapy.     Current Outpatient Medications:   •  amoxicillin-clavulanate (AUGMENTIN) 875-125 MG per tablet, Take 1 tablet by mouth 2 (Two) Times a Day., Disp: 20 tablet, Rfl: 0  •  Calcium-Phosphorus-Vitamin D (CALCIUM/VITAMIN D3/ADULT GUMMY PO), Take  by mouth., Disp: , Rfl:   •  clindamycin (CLEOCIN T) 1 % external solution, Apply topically to the affected area on face as directed 2 (two) times a day., Disp: 60 mL, Rfl: 11  •  escitalopram (Lexapro) 10 MG tablet, Take 1 tablet by mouth Daily., Disp: 90 tablet, Rfl: 3  •  gabapentin (NEURONTIN) 100 MG capsule, Take 1 capsule by mouth Every Night., Disp: 10 capsule, Rfl: 0  •  gabapentin (NEURONTIN) 300 MG capsule, Take 1 capsule by mouth Every Night., Disp: 10 capsule, Rfl: 0  •  levothyroxine (SYNTHROID, LEVOTHROID) 88 MCG tablet, Take 1 tablet by mouth Daily., Disp: 90 tablet, Rfl: 3  •  lidocaine (LIDODERM) 5 %, Place 1 patch on the skin as directed by provider Daily. Remove & Discard patch within 12 hours or as directed by MD (Patient taking differently: Place 1 patch on the skin as directed by provider As Needed. Remove & Discard patch within 12 hours or as directed by MD), Disp: 30 patch, Rfl: 3  •  loratadine (CLARITIN) 10 MG tablet, Take 10 mg by mouth Daily., Disp: , Rfl:   •  medroxyPROGESTERone (PROVERA) 5 MG tablet, Take 1 tablet by mouth Daily., Disp: 10 tablet, Rfl: 0  •  melatonin 5 MG tablet tablet, Take 5 mg by mouth Every Night., Disp: , Rfl:   •  Multiple Vitamins-Minerals (HAIR SKIN AND NAILS FORMULA PO), Take  by mouth., Disp: , Rfl:   •  ondansetron (Zofran) 8 MG tablet, Take 1 tablet by mouth Every 8 (Eight) Hours As Needed for Nausea or Vomiting., Disp: 30 tablet, Rfl: 0  •  oseltamivir (Tamiflu) 75 MG capsule, Take 1 capsule by mouth 2 (Two) Times a Day., Disp: 10 capsule, Rfl: 0  •  Rimegepant Sulfate (Nurtec) 75 MG  tablet dispersible tablet, Take 1 tablet by mouth Daily As Needed (MIGRAINE)., Disp: 8 tablet, Rfl: 3  •  Rimegepant Sulfate (Nurtec) 75 MG tablet dispersible tablet, Place 1 tablet on the tongue Daily As Needed for migraine., Disp: 8 tablet, Rfl: 3  •  traZODone (DESYREL) 50 MG tablet, Take 1 tablet by mouth Every Night., Disp: 30 tablet, Rfl: 2    Drug Interactions  none     Adverse Drug Reactions  • Adverse Reactions Experienced: none  • Plan for ADR Management: N/A (patient education provided, discontinue drug, pharmacist to consult provider, etc.)    Hospitalizations and Urgent Care Since Last Assessment  • Hospitalizations or Admissions: none  • ED Visits: none  • Urgent Office Visits: none     Adherence and Self-Administration  • Approximate Number of Doses Missed Since Last Assessment: none  • Ongoing or New Barriers to Patient Adherence and/or Self-Administration: none   • Methods for Supporting Patient Adherence and/or Self-Administration: N/A     Goals of Therapy  Progress Toward Meeting Patient-Identified Goals of Therapy: On Track  o New Patient-Identified Goals, If Applicable:     • Progress Toward Meeting Clinical Goals or Therapeutic Targets: On Track  o New Clinical Goals or Therapeutic Targets, If Applicable:     Quality of Life Assessment   Quality of Life related to the patient's specialty therapy was discussed with the patient. The QOL segment of this outreach has been reviewed and updated.     Quality of Life Assessment  Quality of Life Improvement Scale: No change  Comments on Quality of Life: tolerating well    Reassessment Plan & Follow-Up  1. Medication Therapy Changes: none  2. Additional Plans, Therapy Recommendations, or Therapy Problems to Be Addressed: none   3. Pharmacist to perform regular reassessments no more than (6) months from the previous assessment.  4. Care Coordinator to set up future refill outreaches, coordinate prescription delivery, and escalate clinical questions to  pharmacist.     Attestation  I attest that the specialty medication(s) addressed above are appropriate for the patient based on my reassessment.  If the prescribed therapy is at any point deemed not appropriate based on the current or future assessments, a consultation will be initiated with the patient's specialty care provider to determine the best course of action. The revised plan of therapy will be documented along with any additional patient education provided.     Electronically signed by Al Mejia RPH, 12/01/22, 12:28 PM EST.

## 2022-12-02 ENCOUNTER — DOCUMENTATION (OUTPATIENT)
Dept: GYNECOLOGIC ONCOLOGY | Facility: CLINIC | Age: 37
End: 2022-12-02

## 2022-12-02 NOTE — PROGRESS NOTES
Regla Barrera Page 1985  [id number]      This patient has been on Cleocin T 1 % solution and is requesting continuation of this drug. She has previously tried and failed Benzoyl Peroxide, Retin-A and Minocycline oral. She has also tried many over the counter products for treatment of her adult acne.

## 2022-12-09 ENCOUNTER — CLINICAL SUPPORT (OUTPATIENT)
Dept: NEUROLOGY | Facility: CLINIC | Age: 37
End: 2022-12-09

## 2022-12-09 VITALS — BODY MASS INDEX: 22.5 KG/M2 | HEIGHT: 63 IN | WEIGHT: 127 LBS

## 2022-12-09 DIAGNOSIS — G44.229 CHRONIC TENSION-TYPE HEADACHE, NOT INTRACTABLE: Primary | ICD-10-CM

## 2022-12-09 DIAGNOSIS — G43.119 INTRACTABLE MIGRAINE WITH AURA WITHOUT STATUS MIGRAINOSUS: ICD-10-CM

## 2022-12-09 PROCEDURE — 20553 NJX 1/MLT TRIGGER POINTS 3/>: CPT | Performed by: PSYCHIATRY & NEUROLOGY

## 2022-12-09 RX ORDER — NORTRIPTYLINE HYDROCHLORIDE 10 MG/1
20 CAPSULE ORAL NIGHTLY
Qty: 60 CAPSULE | Refills: 2 | Status: SHIPPED | OUTPATIENT
Start: 2022-12-09 | End: 2023-01-30

## 2022-12-09 NOTE — PROGRESS NOTES
CC: Cervicogenic headaches.  Procedure Note:  1.         Patient was positioned comfortably  2.         Before injections are started, 10 Trigger Points sites are identified throughout the bilateral upper trapezius muscle, levator scapulae, and erector spinae muscles.    3.         Overlying skin area was cleaned with Alcohol swab                                                                                                              4.         Before injection, trigger points sites were palpated for local twitch and referred pain to confirms placement                         5.         Local anesthetic bupivacaine-total of 10 mL is used.  6.         30 gauge ½ inch needle was utilized to ensure patient comfort          7.         I started with the most tender spot in above mentioned Trigger Points   1.   Localize most tender spot within taut muscle-fibers  2.   Fix tender spot between fingers (1-2 cm in size)   1.   Prevents from rolling away from needle  2.   Controls subcutaneous bleeding  3.   Before injection, patient was instructed of possible pain on injection  4.   Direct needle at 30 degree angle off skin   8.         Insert needle into skin 1-2 cm from Trigger Point   9.         Advance needle into Trigger Point   10.       Use 1cc anesthetic at each Trigger Points identified in step #2  11.       Redirect needle and reinject                                                                                              1.   Withdraw needle to subcutaneous tissue  2.   Redirect needle into adjacent tender areas  3.   Repeat until local twitch or tautness resolves  12.   After each of the 10 injections I held direct pressure at injection site for 2 minutes to prevents hematoma formation  13.   The same procedure was repeated for other Tender Points located in the upper trapezius muscle, levator scapulae and erector spinae bilaterally  14.   Patient was instructed to gently stretch injected areas to full  active range of motion in all directions and was instructed to repeat range of motion three times after injection  15.   Post-Procedure patient was instructed to avoid over-using injected area for 3-4 days   1.   Maintain active range of motion of injected muscle  2.   Patient applies ice to injected areas for a few hours  3.   Anticipate post-injection soreness for 3-4 days  There was no evidence of complications from injection was noted such as local Skin Infection  at injection site or local hematoma at injection site    Note: She is reporting that she has to take Nurtec approximately 8 times in a month for intense migraine and in addition to this, she also gets mild intensity dull aching type of pain approximately 2-3 times in a week.  Many years ago, she tried amitriptyline which helped with migraine prevention.  Since she is also having difficulty with sleep, I am going to stop trazodone and instead start her on nortriptyline 10 mg at bedtime to help reduce both migraine and tension headache intensity and frequency.  I have advised her to call office in 7 to 10 days and based on the response, further dose adjustment will be made.  Otherwise, I will see her back in 12 weeks for next injection.

## 2023-01-06 DIAGNOSIS — J32.9 SINUSITIS, UNSPECIFIED CHRONICITY, UNSPECIFIED LOCATION: Primary | ICD-10-CM

## 2023-01-06 RX ORDER — AZITHROMYCIN 250 MG/1
TABLET, FILM COATED ORAL
Qty: 6 TABLET | Refills: 0 | Status: SHIPPED | OUTPATIENT
Start: 2023-01-06 | End: 2023-01-30

## 2023-01-09 ENCOUNTER — SPECIALTY PHARMACY (OUTPATIENT)
Dept: PHARMACY | Facility: TELEHEALTH | Age: 38
End: 2023-01-09
Payer: COMMERCIAL

## 2023-01-09 NOTE — PROGRESS NOTES
Specialty Pharmacy Patient Management Program  Call Center Refill Outreach      Regla is a 37 y.o. female contacted today regarding refills of her medication(s).    Specialty medication(s) and dose(s) confirmed: Nurtec  Other medications being refilled: N/a    Refill Questions    Flowsheet Row Most Recent Value   Changes to allergies? No   Changes to medications? No   New conditions since last clinic visit No   Unplanned office visit, urgent care, ED, or hospital admission in the last 4 weeks  No   How does patient/caregiver feel medication is working? Very good   Financial problems or insurance changes  No   If yes, describe changes in insurance or financial issues. N/a   Since the previous refill, were any specialty medication doses or scheduled injections missed or delayed?  No   If yes, please provide the amount N/a   Why were doses missed? N/A   Does this patient require a clinical escalation to a pharmacist? No              Medication Adherence    Adherence tools used: directed education  Support network for adherence: family member            Follow-up: 21 days     Alec De La Torre CPhT  Care Coordinator, Specialty Pharmacy Call Center  1/9/2023  11:39 EST

## 2023-01-25 DIAGNOSIS — M53.3 CHRONIC LEFT SI JOINT PAIN: ICD-10-CM

## 2023-01-25 DIAGNOSIS — G89.29 CHRONIC LEFT SI JOINT PAIN: ICD-10-CM

## 2023-01-25 DIAGNOSIS — M25.552 LEFT HIP PAIN: Primary | ICD-10-CM

## 2023-01-30 ENCOUNTER — OFFICE VISIT (OUTPATIENT)
Dept: ORTHOPEDIC SURGERY | Facility: CLINIC | Age: 38
End: 2023-01-30
Payer: COMMERCIAL

## 2023-01-30 VITALS
SYSTOLIC BLOOD PRESSURE: 105 MMHG | BODY MASS INDEX: 21.09 KG/M2 | WEIGHT: 119 LBS | DIASTOLIC BLOOD PRESSURE: 60 MMHG | HEIGHT: 63 IN

## 2023-01-30 DIAGNOSIS — M76.01 GLUTEAL TENDINITIS OF BOTH BUTTOCKS: Primary | ICD-10-CM

## 2023-01-30 DIAGNOSIS — G57.01 PIRIFORMIS SYNDROME OF RIGHT SIDE: ICD-10-CM

## 2023-01-30 DIAGNOSIS — M25.552 BILATERAL HIP PAIN: ICD-10-CM

## 2023-01-30 DIAGNOSIS — M25.551 BILATERAL HIP PAIN: ICD-10-CM

## 2023-01-30 DIAGNOSIS — M76.02 GLUTEAL TENDINITIS OF BOTH BUTTOCKS: Primary | ICD-10-CM

## 2023-01-30 PROCEDURE — 20611 DRAIN/INJ JOINT/BURSA W/US: CPT | Performed by: STUDENT IN AN ORGANIZED HEALTH CARE EDUCATION/TRAINING PROGRAM

## 2023-01-30 PROCEDURE — 99204 OFFICE O/P NEW MOD 45 MIN: CPT | Performed by: STUDENT IN AN ORGANIZED HEALTH CARE EDUCATION/TRAINING PROGRAM

## 2023-01-30 RX ORDER — ROPIVACAINE HYDROCHLORIDE 5 MG/ML
2 INJECTION, SOLUTION EPIDURAL; INFILTRATION; PERINEURAL
Status: COMPLETED | OUTPATIENT
Start: 2023-01-30 | End: 2023-01-30

## 2023-01-30 RX ORDER — TRIAMCINOLONE ACETONIDE 40 MG/ML
80 INJECTION, SUSPENSION INTRA-ARTICULAR; INTRAMUSCULAR
Status: COMPLETED | OUTPATIENT
Start: 2023-01-30 | End: 2023-01-30

## 2023-01-30 RX ORDER — LIDOCAINE HYDROCHLORIDE 10 MG/ML
3 INJECTION, SOLUTION EPIDURAL; INFILTRATION; INTRACAUDAL; PERINEURAL
Status: COMPLETED | OUTPATIENT
Start: 2023-01-30 | End: 2023-01-30

## 2023-01-30 RX ORDER — MELOXICAM 7.5 MG/1
7.5 TABLET ORAL
Qty: 30 TABLET | Refills: 1 | Status: SHIPPED | OUTPATIENT
Start: 2023-01-30 | End: 2023-03-10

## 2023-01-30 RX ADMIN — LIDOCAINE HYDROCHLORIDE 3 ML: 10 INJECTION, SOLUTION EPIDURAL; INFILTRATION; INTRACAUDAL; PERINEURAL at 08:37

## 2023-01-30 RX ADMIN — ROPIVACAINE HYDROCHLORIDE 2 ML: 5 INJECTION, SOLUTION EPIDURAL; INFILTRATION; PERINEURAL at 08:37

## 2023-01-30 RX ADMIN — TRIAMCINOLONE ACETONIDE 80 MG: 40 INJECTION, SUSPENSION INTRA-ARTICULAR; INTRAMUSCULAR at 08:37

## 2023-01-30 NOTE — PROGRESS NOTES
[facility] [place] [facility] Office Visit     Office Visit       Date: 01/30/2023   Patient Name: Regla West  MRN: [id number]  YOB: 1985    Referring Physician: Kvng Cole,*     Chief Complaint:   Chief Complaint   Patient presents with   • Pain     Bilateral hip pain       History of Present Illness:   Regla West is a 37 y.o. female who presents with bilateral hip pain for 9 month(s). Onset atraumatic and gradual in nature. Pain is localized to lateral trochanter and gluteal region and is a 4/10 on the pain scale.Pain is described as aching and shooting. Associated symptoms include pain.  The pain is worse with walking, sitting and exercise; heat, pain medication and/or NSAID and lying down improve the pain. Previous treatments have included: NSAIDS and physical therapy since symptom onset. Although some transient relief was reported with these interventions, these conservative measures have failed and symptoms have persisted. The patient is limited in daily activities and has had a significant decrease in quality of life as a result. She denies fevers, chills, or constitutional symptoms.    Subjective   Review of Systems: Review of Systems   Constitutional: Negative for chills, fever, unexpected weight gain and unexpected weight loss.   HENT: Negative for congestion, postnasal drip and rhinorrhea.    Eyes: Negative for blurred vision.   Respiratory: Negative for shortness of breath.    Cardiovascular: Negative for leg swelling.   Gastrointestinal: Negative for abdominal pain, nausea and vomiting.   Genitourinary: Negative for difficulty urinating.   Musculoskeletal: Positive for arthralgias. Negative for gait problem, joint swelling and myalgias.   Skin: Negative for skin lesions and wound.   Neurological: Negative for dizziness, weakness, light-headedness and numbness.   Hematological: Does not bruise/bleed easily.    Psychiatric/Behavioral: Negative for depressed mood.        I have reviewed the following portions of the patient's history:History of Present Illness and review of systems.    Past Medical History:   Past Medical History:   Diagnosis Date   • Anemia     Taking iron   • Hashimoto's thyroiditis    • Headache    • Hypothyroidism    • Migraine    • Pain in joint, lower leg        Past Surgical History:   Past Surgical History:   Procedure Laterality Date   •  SECTION WITH TUBAL Bilateral 7/3/2018    Procedure:  SECTION PRIMARY WITH TUBAL * 39 WKS *;  Surgeon: Cinda Greenberg MD;  Location: [facility] LABOR DELIVERY;  Service: Obstetrics/Gynecology   • SEPTOPLASTY      2017   • WISDOM TOOTH EXTRACTION         Family History:   Family History   Problem Relation Age of Onset   • Hypertension Father    • Hyperlipidemia Father    • Thyroid disease Father    • Hyperlipidemia Mother    • Breast cancer Neg Hx    • Ovarian cancer Neg Hx    • Uterine cancer Neg Hx    • Colon cancer Neg Hx        Social History:   Social History     Socioeconomic History   • Marital status:    Tobacco Use   • Smoking status: Never   • Smokeless tobacco: Never   Vaping Use   • Vaping Use: Never used   Substance and Sexual Activity   • Alcohol use: Not Currently     Comment: socially   • Drug use: No   • Sexual activity: Yes     Partners: Male     Birth control/protection: Tubal ligation       Medications:   Current Outpatient Medications:   •  Calcium-Phosphorus-Vitamin D (CALCIUM/VITAMIN D3/ADULT GUMMY PO), Take  by mouth., Disp: , Rfl:   •  clindamycin (CLEOCIN T) 1 % external solution, Apply topically to the affected area on face as directed 2 (two) times a day., Disp: 60 mL, Rfl: 11  •  escitalopram (Lexapro) 10 MG tablet, Take 1 tablet by mouth Daily., Disp: 90 tablet, Rfl: 3  •  gabapentin (NEURONTIN) 100 MG capsule, Take 1 capsule by mouth Every Night., Disp: 10 capsule, Rfl: 0  •  gabapentin (NEURONTIN) 300 MG capsule,  "Take 1 capsule by mouth Every Night., Disp: 10 capsule, Rfl: 0  •  levothyroxine (SYNTHROID, LEVOTHROID) 88 MCG tablet, Take 1 tablet by mouth Daily., Disp: 90 tablet, Rfl: 3  •  lidocaine (LIDODERM) 5 %, Place 1 patch on the skin as directed by provider Daily. Remove & Discard patch within 12 hours or as directed by MD (Patient taking differently: Place 1 patch on the skin as directed by provider As Needed. Remove & Discard patch within 12 hours or as directed by MD), Disp: 30 patch, Rfl: 3  •  loratadine (CLARITIN) 10 MG tablet, Take 10 mg by mouth Daily., Disp: , Rfl:   •  melatonin 5 MG tablet tablet, Take 5 mg by mouth Every Night., Disp: , Rfl:   •  meloxicam (MOBIC) 7.5 MG tablet, Take 1 tablet by mouth Daily With Breakfast., Disp: 30 tablet, Rfl: 1  •  Multiple Vitamins-Minerals (HAIR SKIN AND NAILS FORMULA PO), Take  by mouth., Disp: , Rfl:   •  Rimegepant Sulfate (Nurtec) 75 MG tablet dispersible tablet, Take 1 tablet by mouth Daily As Needed (MIGRAINE)., Disp: 8 tablet, Rfl: 3  •  Rimegepant Sulfate (Nurtec) 75 MG tablet dispersible tablet, Place 1 tablet on the tongue Daily As Needed for migraine., Disp: 8 tablet, Rfl: 3    Allergies:   Allergies   Allergen Reactions   • Macrobid [Nitrofurantoin Monohyd Macro] Rash       I reviewed the patient's chief complaint, history of present illness, review of systems, past medical history, surgical history, family history, social history, medications and allergy list.     Objective    Vital Signs:   Vitals:    01/30/23 0744   BP: 105/60   Weight: 54 kg (119 lb)   Height: 160 cm (62.99\")     Body mass index is 21.09 kg/m².   BMI is within normal parameters. No other follow-up for BMI required.     Patient reports that she is a non-smoker and has not ever been a smoker.  This behavior was applauded and she was encouraged to continue in smoking cessation.  We will continue to monitor at subsequent visits.    Ortho Exam:  Constitutional: General Appearance: " healthy-appearing and NAD.   Psychiatric: Orientation: oriented to time, place, and person. Mood and Affect: normal affect and mood and active and alert.   Gait and Station: Appearance: ambulating with no assistive devices and limp.   Hip/Pelvis Appearance: Inspection: normal axial alignment and pelvis level.   Hips: Bony Palpation: tenderness of the greater trochanter. Passive Range of Motion: no flexion contracture, hamstring tightness popliteal angle, or pain with motion and normal, flexion normal, extension normal, internal rotation normal, and external rotation normal. trength: normal 5/5. Strength Left: normal 5/5.   Bilateral hip exam:   Normal hip contour without evidence of ecchymosis or swelling.   Range of motion of the hip shows pain only is exacerbated with Sara test, Straight Leg Raise.   Negative log roll, FADIR.   Hip flexion 110°, internal rotation 10°, external rotation 60°.   Tenderness to palpation greatest at the greater trochanter region.   Mild tenderness along the iliotibial band.   Mild tenderness at the gluteal region.   Negative tenderness at the ischial tuberosity.   Negative SI joint tenderness to palpation.  left hip exam:   Normal hip contour without evidence of swelling or echymosis.   Full range of motion without exacerbation of pain.   Negative tenderness to palpation throughout.    Results Review:   Imaging Results (Last 24 Hours)     Procedure Component Value Units Date/Time    US Guided Injection [[id number]] Resulted: 01/30/23 0839     Updated: 01/30/23 0902    XR Hips Bilateral With or Without Pelvis 3-4 View [[id number]] Resulted: 01/30/23 0811     Updated: 01/30/23 0812    Narrative:      Indication: Bilateral hip pain    Views: AP pelvis and lateral view of the hip are submitted.    Impression:  There are no acute findings. There is no fracture subluxation   or dislocation.  Mild degenerative changes as well as a cam deformity of   the femoral head.    Comparison: No  additional images available for comparison review.           Procedures    Assessment / Plan    Assessment/Plan:   Diagnoses and all orders for this visit:    1. Bilateral hip pain (Primary)  -     XR Hips Bilateral With or Without Pelvis 3-4 View  -     US Guided Injection    2. Gluteal tendinitis of both buttocks  -     meloxicam (MOBIC) 7.5 MG tablet; Take 1 tablet by mouth Daily With Breakfast.  Dispense: 30 tablet; Refill: 1    3. Piriformis syndrome of right side    Other orders  -     - Large Joint Arthrocentesis: R greater trochanteric bursa      Almost 1 year of lateral hip, posterior hip, and SI joint pain.  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  No pain in the anterior hip at this time.  Symptoms most provokable at the lateral hip from the glued attachment to the greater trochanter.  She does also have pain posterior to the distribution of the piriformis with pain on the posterior aspect of the leg.  Symptoms worse on the right but also present on the left.  She is already completed physical therapy.      We discussed my findings and treatment options. Treatment always begins with nonoperative management which includes ITB stretches, NSAIDs, PT if deemed necessary. Local steroid injections can also be effective and were discussed. We discussed the role that gait disturbance can play in causation. Surgical Treatment is reserved to very intractible cases.    Given that she is already been on Neurontin and been to physical therapy, we elected to escalate care and proceed with local steroid injection under ultrasound guidance into the subglutes max bursa.  Risks and benefits were discussed.  She like to proceed and tolerate procedure well.  See procedure note.  I will see her back in 4 weeks to monitor response in the meantime have her start on meloxicam on an as-needed basis.  If she gets good improvement, would consider contralateral  side injection.  If she continues to have piriformis type pain there are procedures at that level as well.  Would likely defer to pain management for SI joint.    Past documentation reviewed: YANN Stevenson-10/26/2022-office visit.    Past lab results reviewed: 10/18/2022-Free T4 1.47, TSH 2.940.  10/6/2021-creatinine 1.02, EGFR 61.    Past imaging studies reviewed: Radiographs of the sacrum and coccyx-10/26/2022.    Follow Up:   Return in about 4 weeks (around 2/27/2023) for Recheck.      Yunior Urena MD  [facility] [place] [facility]

## 2023-01-30 NOTE — PROGRESS NOTES
Procedure   - Large Joint Arthrocentesis: R greater trochanteric bursa on 1/30/2023 8:37 AM  Indications: pain  Details: 22 G needle, ultrasound-guided lateral approach  Medications: 3 mL lidocaine PF 1% 1 %; 2 mL ropivacaine 0.5 %; 80 mg triamcinolone acetonide 40 MG/ML  Outcome: tolerated well, no immediate complications  Procedure, treatment alternatives, risks and benefits explained, specific risks discussed. Consent was given by the patient. Immediately prior to procedure a time out was called to verify the correct patient, procedure, equipment, support staff and site/side marked as required. Patient was prepped and draped in the usual sterile fashion.

## 2023-02-22 DIAGNOSIS — F41.0 PANIC DISORDER: Primary | ICD-10-CM

## 2023-02-22 RX ORDER — ALPRAZOLAM 0.25 MG/1
0.25 TABLET ORAL DAILY PRN
Qty: 30 TABLET | Refills: 0 | Status: SHIPPED | OUTPATIENT
Start: 2023-02-22

## 2023-03-02 ENCOUNTER — SPECIALTY PHARMACY (OUTPATIENT)
Dept: PHARMACY | Facility: TELEHEALTH | Age: 38
End: 2023-03-02
Payer: COMMERCIAL

## 2023-03-06 ENCOUNTER — OFFICE VISIT (OUTPATIENT)
Dept: ORTHOPEDIC SURGERY | Facility: CLINIC | Age: 38
End: 2023-03-06
Payer: COMMERCIAL

## 2023-03-06 VITALS
HEIGHT: 63 IN | BODY MASS INDEX: 21.09 KG/M2 | SYSTOLIC BLOOD PRESSURE: 118 MMHG | WEIGHT: 119 LBS | DIASTOLIC BLOOD PRESSURE: 70 MMHG

## 2023-03-06 DIAGNOSIS — M76.02 GLUTEAL TENDINITIS OF BOTH BUTTOCKS: ICD-10-CM

## 2023-03-06 DIAGNOSIS — G57.01 PIRIFORMIS SYNDROME OF RIGHT SIDE: Primary | ICD-10-CM

## 2023-03-06 DIAGNOSIS — M76.01 GLUTEAL TENDINITIS OF BOTH BUTTOCKS: ICD-10-CM

## 2023-03-06 PROCEDURE — 99213 OFFICE O/P EST LOW 20 MIN: CPT | Performed by: STUDENT IN AN ORGANIZED HEALTH CARE EDUCATION/TRAINING PROGRAM

## 2023-03-06 NOTE — PROGRESS NOTES
[facility] [place] [facility] Office Follow Up Visit     Office Follow Up      Date: 03/06/2023   Patient Name: Regla West  MRN: [id number]  YOB: 1985    Referring Physician: No ref. provider found     Chief Complaint:   Chief Complaint   Patient presents with   • Follow-up     5 week recheck - Bilateral hip pain (Primary); Gluteal tendinitis of both buttocks; Piriformis syndrome of right side       History of Present Illness: Regla West is a 37 y.o. female who is here today for follow up on bilateral hip pain.  At last visit, she received ultrasound-guided corticosteroid injection into the right lateral hip.  Reports interval improvement in her symptoms.  Her pain is now 4/10.  She has been on meloxicam on an as-needed basis.  Denies any new injury or symptoms.    Subjective   Review of Systems: Review of Systems   Constitutional: Negative for chills, fever, unexpected weight gain and unexpected weight loss.   HENT: Negative for congestion, postnasal drip and rhinorrhea.    Eyes: Negative for blurred vision.   Respiratory: Negative for shortness of breath.    Cardiovascular: Negative for leg swelling.   Gastrointestinal: Negative for abdominal pain, nausea and vomiting.   Genitourinary: Negative for difficulty urinating.   Musculoskeletal: Positive for arthralgias. Negative for gait problem, joint swelling and myalgias.   Skin: Negative for skin lesions and wound.   Neurological: Negative for dizziness, weakness, light-headedness and numbness.   Hematological: Does not bruise/bleed easily.   Psychiatric/Behavioral: Negative for depressed mood.        Medications:   Current Outpatient Medications:   •  ALPRAZolam (XANAX) 0.25 MG tablet, Take 1 tablet by mouth Daily as needed for panic, Disp: 30 tablet, Rfl: 0  •  Calcium-Phosphorus-Vitamin D (CALCIUM/VITAMIN D3/ADULT GUMMY PO), Take  by mouth., Disp: , Rfl:   •  clindamycin (CLEOCIN T) 1 % external  solution, Apply topically to the affected area on face as directed 2 (two) times a day., Disp: 60 mL, Rfl: 11  •  escitalopram (Lexapro) 10 MG tablet, Take 1 tablet by mouth Daily., Disp: 90 tablet, Rfl: 3  •  gabapentin (NEURONTIN) 100 MG capsule, Take 1 capsule by mouth Every Night., Disp: 10 capsule, Rfl: 0  •  gabapentin (NEURONTIN) 300 MG capsule, Take 1 capsule by mouth Every Night., Disp: 10 capsule, Rfl: 0  •  levothyroxine (SYNTHROID, LEVOTHROID) 88 MCG tablet, Take 1 tablet by mouth Daily., Disp: 90 tablet, Rfl: 3  •  lidocaine (LIDODERM) 5 %, Place 1 patch on the skin as directed by provider Daily. Remove & Discard patch within 12 hours or as directed by MD (Patient taking differently: Place 1 patch on the skin as directed by provider As Needed. Remove & Discard patch within 12 hours or as directed by MD), Disp: 30 patch, Rfl: 3  •  loratadine (CLARITIN) 10 MG tablet, Take 1 tablet by mouth Daily., Disp: , Rfl:   •  melatonin 5 MG tablet tablet, Take 1 tablet by mouth Every Night., Disp: , Rfl:   •  meloxicam (MOBIC) 7.5 MG tablet, Take 1 tablet by mouth Daily With Breakfast., Disp: 30 tablet, Rfl: 1  •  Multiple Vitamins-Minerals (HAIR SKIN AND NAILS FORMULA PO), Take  by mouth., Disp: , Rfl:   •  Rimegepant Sulfate (Nurtec) 75 MG tablet dispersible tablet, Take 1 tablet by mouth Daily As Needed (MIGRAINE)., Disp: 8 tablet, Rfl: 3  •  Rimegepant Sulfate (Nurtec) 75 MG tablet dispersible tablet, Place 1 tablet on the tongue Daily As Needed for migraine., Disp: 8 tablet, Rfl: 3    Allergies:   Allergies   Allergen Reactions   • Macrobid [Nitrofurantoin Monohyd Macro] Rash       I have reviewed and updated the patient's chief complaint, history of present illness, review of systems, past medical history, surgical history, family history, social history, medications and allergy list as appropriate.     Objective    Vital Signs:   Vitals:    03/06/23 0740   BP: 118/70   Weight: 54 kg (119 lb)   Height: 160 cm  "(62.99\")     Body mass index is 21.09 kg/m².  BMI is within normal parameters. No other follow-up for BMI required.    Patient reports that she is a non-smoker and has not ever been a smoker.  This behavior was applauded and she was encouraged to continue in smoking cessation.  We will continue to monitor at subsequent visits.    Ortho Exam:  Constitutional: General Appearance: healthy-appearing, NAD, and normal body habitus.   Psychiatric: Mood and Affect: normal affect and mood and active and alert.   Gait and Station: Appearance: ambulating with no assistive devices and limp.   Cardiovascular System: Arterial Pulses Right: dorsalis pedis pulse normal and posterior tibialis pulse normal. Arterial Pulses Left: dorsalis pedis pulse normal and posterior tibialis pulse normal. Edema Right: none. Edema Left: none.   Lumbar Spine: Inspection: normal alignment. Bony Palpation: no tenderness of the spinous process, the transverse process, the paraspinals, or the coccyx and tenderness of the sacrum. Special Tests: seated straight leg raising test positive.   Hip/Pelvis Appearance: Inspection: normal axial alignment.   Hips: Bony Palpation Right: no tenderness of the iliac crest, the ASIS, the PSIS, the pubic tubercle, the sciatic notch, the ischial tuberosity, the SI joint, or the greater trochanter. Soft Tissue Palpation Right: no tenderness of the hip flexor muscles, the hip adductor muscles, the biceps femoris muscle, the semimembranous muscle, or the semitendinous muscle and tenderness of the piriformis. Active Range of Motion Right: normal, flexion normal, extension normal, internal rotation normal, and external rotation normal. Passive Range of Motion Right: no flexion contracture, hamstring tightness popliteal angle, or pain with motion and normal, flexion normal, extension normal, internal rotation normal, and external rotation normal. Special Tests Right: Ron-Fabere test positive; negative FADIR, axial load. " Strength Right: normal 5/5.   Skin: Right Lower Extremity: normal.    Results Review:   Imaging Results (Last 24 Hours)     ** No results found for the last 24 hours. **        Procedures    Assessment / Plan    Assessment/Plan:   Diagnoses and all orders for this visit:    1. Piriformis syndrome of right side (Primary)  -     MRI Hip Right Without Contrast; Future    2. Gluteal tendinitis of both buttocks      Follow-up on bilateral hip pain worse on the right compared to the left.  At last visit, she received ultrasound-guided right greater trochanteric bursa injection.  Reports complete resolution of her symptoms for 3 weeks but pain has gradually returned.  Now localized more over the posterior hip through the distribution of the piriformis.  Symptoms are better at the lateral hip from the gluteal tendinitis.  She does have pain down the posterior leg.  I am concerned for involvement of the sciatic nerve.  To further evaluate her multifaceted hip pain, plan to obtain MRI of the right hip.  I will see her back after MRI to discuss results and the neck steps in her treatment.  She does have low back pain as well and with a normal MRI of right hip, I would consider lumbar spine referral.    Follow Up:   Return for MRI right hip review.      Yunior Urena MD  [facility] [place] [facility]

## 2023-03-07 ENCOUNTER — TELEPHONE (OUTPATIENT)
Dept: ORTHOPEDIC SURGERY | Facility: CLINIC | Age: 38
End: 2023-03-07
Payer: COMMERCIAL

## 2023-03-07 DIAGNOSIS — L70.0 ACNE VULGARIS: ICD-10-CM

## 2023-03-07 RX ORDER — CLINDAMYCIN PHOSPHATE 11.9 MG/ML
1 SOLUTION TOPICAL 2 TIMES DAILY
Qty: 60 ML | Refills: 11 | Status: SHIPPED | OUTPATIENT
Start: 2023-03-07

## 2023-03-07 NOTE — TELEPHONE ENCOUNTER
CALLED PATIENT TO LET HER KNOW ABOUT MRI APPOINTMENT ON MARCH 9TH AND 1045AM, FOR THEIR RIGHT HIP. LEFT A MESSAGE TO CALL THE OFFICE BACK TO INFORM ABOUT APPOTINTMENT AND TO SCHEDULE FU APPT WITH DR PINA TO REVIEW RESULTS.

## 2023-03-08 ENCOUNTER — SPECIALTY PHARMACY (OUTPATIENT)
Dept: PHARMACY | Facility: TELEHEALTH | Age: 38
End: 2023-03-08
Payer: COMMERCIAL

## 2023-03-08 NOTE — PROGRESS NOTES
Specialty Pharmacy Patient Management Program  Call Center Refill Outreach      Regla is a 37 y.o. female contacted today regarding refills of her medication(s).    Specialty medication(s) and dose(s) confirmed: Nurtec  Other medications being refilled: N/a    Refill Questions    Flowsheet Row Most Recent Value   Changes to allergies? No   Changes to medications? No   New conditions since last clinic visit No   Unplanned office visit, urgent care, ED, or hospital admission in the last 4 weeks  No   How does patient/caregiver feel medication is working? Very good   Financial problems or insurance changes  No   If yes, describe changes in insurance or financial issues. N/A   Since the previous refill, were any specialty medication doses or scheduled injections missed or delayed?  No   If yes, please provide the amount N/A   Why were doses missed? N/A   Does this patient require a clinical escalation to a pharmacist? No              Medication Adherence    Adherence tools used: directed education  Support network for adherence: family member            Follow-up: 21 days     Alec De La Torre CPhT  Care Coordinator, Specialty Pharmacy Call Center  3/8/2023  11:02 EST

## 2023-03-09 ENCOUNTER — HOSPITAL ENCOUNTER (OUTPATIENT)
Dept: MRI IMAGING | Facility: HOSPITAL | Age: 38
Discharge: HOME | End: 2023-03-09
Admitting: STUDENT IN AN ORGANIZED HEALTH CARE EDUCATION/TRAINING PROGRAM
Payer: COMMERCIAL

## 2023-03-09 DIAGNOSIS — G57.01 PIRIFORMIS SYNDROME OF RIGHT SIDE: ICD-10-CM

## 2023-03-09 PROCEDURE — 73721 MRI JNT OF LWR EXTRE W/O DYE: CPT

## 2023-03-10 ENCOUNTER — OFFICE VISIT (OUTPATIENT)
Dept: OBSTETRICS AND GYNECOLOGY | Facility: CLINIC | Age: 38
End: 2023-03-10
Payer: COMMERCIAL

## 2023-03-10 VITALS
DIASTOLIC BLOOD PRESSURE: 62 MMHG | BODY MASS INDEX: 21.62 KG/M2 | SYSTOLIC BLOOD PRESSURE: 100 MMHG | WEIGHT: 122 LBS | HEIGHT: 63 IN

## 2023-03-10 DIAGNOSIS — N83.201 CYST OF RIGHT OVARY: ICD-10-CM

## 2023-03-10 DIAGNOSIS — N83.209 CYST OF OVARY, UNSPECIFIED LATERALITY: ICD-10-CM

## 2023-03-10 DIAGNOSIS — N93.9 ABNORMAL UTERINE BLEEDING (AUB): ICD-10-CM

## 2023-03-10 DIAGNOSIS — R10.2 PELVIC PAIN: Primary | ICD-10-CM

## 2023-03-10 PROCEDURE — 99214 OFFICE O/P EST MOD 30 MIN: CPT | Performed by: OBSTETRICS & GYNECOLOGY

## 2023-03-10 PROCEDURE — 58100 BIOPSY OF UTERUS LINING: CPT | Performed by: OBSTETRICS & GYNECOLOGY

## 2023-03-10 NOTE — PROGRESS NOTES
Gynecologic Preoperative Exam Note        Subjective   Regla West is a 37 y.o. year old  who is scheduled for Diagnostic Laparoscopy with possible laser, Right ovarian cystectomy, bilateral salpingectomy, and Hysteroscopy D&C with Bella at [facility] on 23 at 12 PM. Her pre operative diagnosis is Pelvic Pain, AUB, & right ovarian cyst. She does not need to see her PCP for preop clearance for this surgery. Patient's last menstrual period was 02/15/2023.. Her birth control method is bilateral tubal ligation.  Her BMI is Body mass index is 21.61 kg/m²..        She has reviewed the informational pamphlet on 03/10/23.    She understands the risks of bleeding, infection, possible damage to other organ systems, including but not limited to the gastrointestinal tract and genitourinary tract.  She also understands the specific risks listed in the preop information (video, pamphlets, etc.).    She has reviewed and signed the preop consent form.    She has been instructed to have a light dinner the night before surgery, then nothing to eat or drink after midnight.  The day of surgery do not chew gum or smoke.  Remove all jewelry, nail polish, contact lenses prior to coming to the hospital.  Do not bring valuables or large sums of money with you. Patient was instructed on what time to arrive and where to check in, maps were given.  She was instructed that she will meet an Anesthesiologist and that an IV will be started to provide fluids and sedation.  The total time of procedure was discussed.  She was instructed that she will need a .      Allergies   Allergen Reactions   • Macrobid [Nitrofurantoin Monohyd Macro] Rash     She has confirmed that she is not allergic to Latex.     She is on the following medications. These were reviewed with the patient today and instructed on which medications are ok to take with a sip of water prior to the surgery.      Current Outpatient Medications:   •  ALPRAZolam  (XANAX) 0.25 MG tablet, Take 1 tablet by mouth Daily as needed for panic, Disp: 30 tablet, Rfl: 0  •  Calcium-Phosphorus-Vitamin D (CALCIUM/VITAMIN D3/ADULT GUMMY PO), Take  by mouth., Disp: , Rfl:   •  clindamycin (CLEOCIN T) 1 % external solution, Apply topically to the affected area on face as directed 2 (two) times a day., Disp: 60 mL, Rfl: 11  •  escitalopram (Lexapro) 10 MG tablet, Take 1 tablet by mouth Daily., Disp: 90 tablet, Rfl: 3  •  levothyroxine (SYNTHROID, LEVOTHROID) 88 MCG tablet, Take 1 tablet by mouth Daily., Disp: 90 tablet, Rfl: 3  •  lidocaine (LIDODERM) 5 %, Place 1 patch on the skin as directed by provider Daily. Remove & Discard patch within 12 hours or as directed by MD (Patient taking differently: Place 1 patch on the skin as directed by provider As Needed. Remove & Discard patch within 12 hours or as directed by MD), Disp: 30 patch, Rfl: 3  •  loratadine (CLARITIN) 10 MG tablet, Take 1 tablet by mouth Daily., Disp: , Rfl:   •  melatonin 5 MG tablet tablet, Take 1 tablet by mouth Every Night., Disp: , Rfl:   •  Multiple Vitamins-Minerals (HAIR SKIN AND NAILS FORMULA PO), Take  by mouth., Disp: , Rfl:   •  Rimegepant Sulfate (Nurtec) 75 MG tablet dispersible tablet, Take 1 tablet by mouth Daily As Needed (MIGRAINE)., Disp: 8 tablet, Rfl: 3  •  Rimegepant Sulfate (Nurtec) 75 MG tablet dispersible tablet, Place 1 tablet on the tongue Daily As Needed for migraine., Disp: 8 tablet, Rfl: 3  •  HYDROcodone-acetaminophen (Norco) 5-325 MG per tablet, Take 1 tablet by mouth Every 4 (Four) Hours As Needed for Moderate Pain., Disp: 15 tablet, Rfl: 0  •  oxyCODONE-acetaminophen (Percocet) 5-325 MG per tablet, Take 1 tablet by mouth Every 4 (Four) Hours As Needed (15)., Disp: 15 tablet, Rfl: 0     Past Medical History:   Diagnosis Date   • Anemia     Taking iron   • Hashimoto's thyroiditis    • Headache    • Hypothyroidism    • Migraine    • Pain in joint, lower leg      Past Surgical History:    Procedure Laterality Date   •  SECTION WITH TUBAL Bilateral 7/3/2018    Procedure:  SECTION PRIMARY WITH TUBAL * 39 WKS *;  Surgeon: Cinda Greenberg MD;  Location: [facility] LABOR DELIVERY;  Service: Obstetrics/Gynecology   • SEPTOPLASTY      2017   • WISDOM TOOTH EXTRACTION       OB History    Para Term  AB Living   2 2 2 0 0 2   SAB IAB Ectopic Molar Multiple Live Births   0 0 0 0 0 2      # Outcome Date GA Lbr Kyle/2nd Weight Sex Delivery Anes PTL Lv   2 Term 18 37w6d  3383 g (7 lb 7.3 oz) F CS-LTranv Spinal  LUCIA      Name: RICHARD POZO      Apgar1: 8  Apgar5: 9   1 Term 09/21/15 37w2d    Vag-Vacuum   LUCIA     Social History     Tobacco Use   Smoking Status Never   Smokeless Tobacco Never     Social History     Substance and Sexual Activity   Alcohol Use Not Currently    Comment: socially     Social History     Substance and Sexual Activity   Drug Use No         Review of Systems   Constitutional: Negative.    HENT: Negative.    Eyes: Negative.    Respiratory: Negative.    Cardiovascular: Negative.    Gastrointestinal: Negative.    Endocrine: Negative.    Genitourinary: Positive for menstrual problem, pelvic pain and vaginal bleeding.   Musculoskeletal: Negative.    Skin: Negative.    Allergic/Immunologic: Negative.    Neurological: Negative.    Hematological: Negative.    Psychiatric/Behavioral: Negative.       All other systems reviewed and negative.          Objective    Vitals:    03/10/23 0834   BP: 100/62         Physical Exam  Vitals and nursing note reviewed. Exam conducted with a chaperone present.   Constitutional:       Appearance: She is well-developed.   HENT:      Head: Normocephalic and atraumatic.   Cardiovascular:      Rate and Rhythm: Normal rate and regular rhythm.   Pulmonary:      Effort: Pulmonary effort is normal.      Breath sounds: Normal breath sounds.   Abdominal:      General: Bowel sounds are normal.      Palpations: Abdomen is soft. Abdomen is not  rigid.   Musculoskeletal:      Cervical back: Normal range of motion. No muscular tenderness.   Skin:     General: Skin is warm and dry.   Neurological:      Mental Status: She is alert and oriented to person, place, and time.   Psychiatric:         Behavior: Behavior normal.         Assessment   Problem List Items Addressed This Visit    None  Visit Diagnoses     Pelvic pain    -  Primary    Relevant Orders    US Non-ob Transvaginal (Completed)    Cyst of ovary, unspecified laterality        Relevant Orders    US Non-ob Transvaginal (Completed)    Abnormal uterine bleeding (AUB)        Relevant Orders    TISSUE EXAM, [facility] ([facility])    CBC & Differential (Completed)    TSH (Completed)    T4, Free (Completed)    HCG, B-subunit, Quantitative (Completed)    Basic Metabolic Panel (Completed)    Cyst of right ovary                       Plan   1. Plan is for dx hysteroscopy, D&C, Novasure endometrial ablation, dx laparoscopy, possible CO2 laser, opportunistic salpingectomy and right ovarian cystectomy vs oophorectomy.  2. Risks of surgery were reviewed with the patient including risks of bleeding, infection, damage to other organ systems including, but not limited to GI and  tracts (bowel, bladder, blood vessels, nerves) risks of Anesthesia, as well as the risk the surgery will not produce the desired results, possible need for additional surgery, death, risk of uterine perforation.   She also understands increased risk of future hysterectomy  3. PAT Scheduled    4. Valentin has been obtained and reviewed   5. Pain Medication Consent Form has been signed.  A review regarding proper medication administration, impact on driving and working while medicated, the safety of use in pregnancy, the potential for overdose and the proper disposal and storage of controlled medications has been done with the patient.          Genoveva Sanders MD  3/13/2023

## 2023-03-10 NOTE — PROGRESS NOTES
Pelvic Pain and Ovarian Cyst        HPI  Regla West is a 37 y.o. female, , who presents for initial evaluation evaluation of pelvic pain.      The pain is located in the right lower quadrant and it does occasionally radiate to mid-pelvis. She has experiences RLQ pain constantly. She describes the pain as sharp and stabbing on Saturday. Patient describes pelvic pain today as aching. She rates her pain score today as a 4/10. Patient notes aggravating factors include menstruation and alleviating factors are heating pad. The patient reports additional symptoms as bloating. The patient has not previously been evaluated for pelvic pain. The patient is sexually active. She has not had new partners.    Did the patient have u/s today? Yes.  Findings showed 2 right ovarian cysts measuring 43.3 x 34.9 x 39.6MM- anechoic and 34.4 x 15.0 x 28.0MM- complex. No free fluid visualized. EMC: 4.7MM. No fibroids or polyps identified. I have personally evaluated the U/S and agree with the findings. Genoveva Sanders MD     Patient has MRI of pelvis for hip pain on 2023 that revealed a right ovarian cyst measuring 5.4CM with mild free fluid visualized.    Her last LMP was Patient's last menstrual period was 02/15/2023. Periods are regular every 3 weeks, lasting several days. Flow is spotting for day 1, then heavy for day 2-5, then spotting for day 6-10. Patient reports changing a tampon/pad every 1 hour on her heaviest day of flow. Patient reports bleeding through her clothes several times. Dysmenorrhea:moderate, occurring on heavier days of flow.  Patient states that for the last 3 months her menses have been occurring every 3 weeks as opposed to her usual every 4 weeks. Patient states that within the last 1 month she has had only 5 days of absent vaginal bleeding. Patient states that with acute onset of RLQ pain on 2023, she began having vaginal spotting. Patient reports vaginal spotting this  AM.     Problems with GI: yes - IBS  History of urinary disease: no  Concern for Anxiety or Depression: yes, anxiety  Exercises Regularly: yes  Tobacco Usage?: No          Gynecologic Procedure Note        Endometrial Biopsy      Indications:@ is a 37 y.o. , who presents today for endometrial biopsy. The patient was noted to have Abnormal Uterine Bleeding and Intermenstrual Spotting.  Her LMP is Patient's last menstrual period was 02/15/2023. After being presented with the risk, benefits, and specific detail of the procedure, the patient wished to proceed.  Written consent was obtained from patient. Urine pregnancy test was Not indicated. Patient does not have an allergy to betadine or shellfish.     Procedure Details   The patient was placed on the table in the dorsal lithotomy position.  She was draped in the appropriate manner.  A speculum was placed in the vagina.  The cervix was visualized and prepped with Betadine.  A tenaculum was not placed on the anterior lip of the cervix for traction.  A small plastic 5 mm Pipelle syringe curette was inserted into the cervical canal.  The uterus was sounded to 7 cm's.  A vigorous four quadrant biopsy was performed, removing a small amount of tissue.  The tissue was placed in Formalin and sent to Pathology.  The patient tolerated the procedure well and she reported mild cramping.  The tenaculum was removed from the cervix and the speculum was removed.  The patient was observed for 5 minutes.           Complications: none.     Procedures    Review of Systems    Plan:  Orders Placed This Encounter   Procedures   • US Non-ob Transvaginal     Standing Status:   Future     Number of Occurrences:   1     Standing Expiration Date:   3/9/2024     Order Specific Question:   Reason for Exam:     Answer:   Pelvic pain and Ovarian cyst     Order Specific Question:   Release to patient     Answer:   Routine Release       Problem List Items Addressed This Visit    None  Visit  Diagnoses     Pelvic pain    -  Primary    Relevant Orders    US Non-ob Transvaginal (Completed)    Cyst of ovary, unspecified laterality        Relevant Orders    US Non-ob Transvaginal (Completed)    Abnormal uterine bleeding (AUB)        Cyst of right ovary                Additional OB/GYN History   Last Pap :   Last Completed Pap Smear          PAP SMEAR (Every 3 Years) Next due on 2021  SCANNED - PAP SMEAR    2020  Done - neg, neg HPV                OB History        2    Para   2    Term   2            AB        Living   2       SAB        IAB        Ectopic        Molar        Multiple   0    Live Births   2                  Current Outpatient Medications:   •  ALPRAZolam (XANAX) 0.25 MG tablet, Take 1 tablet by mouth Daily as needed for panic, Disp: 30 tablet, Rfl: 0  •  Calcium-Phosphorus-Vitamin D (CALCIUM/VITAMIN D3/ADULT GUMMY PO), Take  by mouth., Disp: , Rfl:   •  clindamycin (CLEOCIN T) 1 % external solution, Apply topically to the affected area on face as directed 2 (two) times a day., Disp: 60 mL, Rfl: 11  •  escitalopram (Lexapro) 10 MG tablet, Take 1 tablet by mouth Daily., Disp: 90 tablet, Rfl: 3  •  levothyroxine (SYNTHROID, LEVOTHROID) 88 MCG tablet, Take 1 tablet by mouth Daily., Disp: 90 tablet, Rfl: 3  •  lidocaine (LIDODERM) 5 %, Place 1 patch on the skin as directed by provider Daily. Remove & Discard patch within 12 hours or as directed by MD (Patient taking differently: Place 1 patch on the skin as directed by provider As Needed. Remove & Discard patch within 12 hours or as directed by MD), Disp: 30 patch, Rfl: 3  •  loratadine (CLARITIN) 10 MG tablet, Take 1 tablet by mouth Daily., Disp: , Rfl:   •  melatonin 5 MG tablet tablet, Take 1 tablet by mouth Every Night., Disp: , Rfl:   •  Multiple Vitamins-Minerals (HAIR SKIN AND NAILS FORMULA PO), Take  by mouth., Disp: , Rfl:   •  Rimegepant Sulfate (Nurtec) 75 MG tablet dispersible tablet, Take 1  "tablet by mouth Daily As Needed (MIGRAINE)., Disp: 8 tablet, Rfl: 3  •  Rimegepant Sulfate (Nurtec) 75 MG tablet dispersible tablet, Place 1 tablet on the tongue Daily As Needed for migraine., Disp: 8 tablet, Rfl: 3     Past Medical History:   Diagnosis Date   • Anemia     Taking iron   • Hashimoto's thyroiditis    • Headache    • Hypothyroidism    • Migraine    • Pain in joint, lower leg         Past Surgical History:   Procedure Laterality Date   •  SECTION WITH TUBAL Bilateral 7/3/2018    Procedure:  SECTION PRIMARY WITH TUBAL * 39 WKS *;  Surgeon: Cinda Greenberg MD;  Location: [facility] LABOR DELIVERY;  Service: Obstetrics/Gynecology   • SEPTOPLASTY      2017   • WISDOM TOOTH EXTRACTION         The additional following portions of the patient's history were reviewed and updated as appropriate: allergies and current medications.      Review of Systems   Constitutional: Negative.    HENT: Negative.    Eyes: Negative.    Respiratory: Negative.    Cardiovascular: Negative.    Gastrointestinal: Negative.    Endocrine: Negative.    Genitourinary: Positive for menstrual problem, pelvic pain and vaginal bleeding.   Musculoskeletal: Negative.    Skin: Negative.    Allergic/Immunologic: Negative.    Neurological: Negative.    Hematological: Negative.    Psychiatric/Behavioral: Negative.      All other systems reviewed and are negative.     I have reviewed and agree with the HPI, ROS, and historical information as entered above. Genoveva Sanders MD    Objective   /62   Ht 160 cm (63\")   Wt 55.3 kg (122 lb)   LMP 02/15/2023   BMI 21.61 kg/m²     Physical Exam  Vitals and nursing note reviewed. Exam conducted with a chaperone present.   Constitutional:       Appearance: She is well-developed.   HENT:      Head: Normocephalic.   Eyes:      Conjunctiva/sclera: Conjunctivae normal.   Pulmonary:      Effort: Pulmonary effort is normal.   Genitourinary:     General: Normal vulva.      Exam position: Lithotomy " position.      Labia:         Right: No rash, tenderness or lesion.         Left: No rash, tenderness or lesion.       Urethra: No urethral pain, urethral swelling or urethral lesion.      Vagina: Normal. No tenderness or lesions.      Cervix: No cervical motion tenderness, discharge, lesion or cervical bleeding.      Uterus: Normal. Not enlarged, not fixed and not tender.       Adnexa:         Right: Tenderness and fullness present.         Left: No mass, tenderness or fullness.        Rectum: No external hemorrhoid.      Comments: Chaperone Present  Psychiatric:         Behavior: Behavior normal.         Assessment & Plan     Assessment and Plan    Problem List Items Addressed This Visit    None  Visit Diagnoses     Pelvic pain    -  Primary    Relevant Orders    US Non-ob Transvaginal (Completed)    Cyst of ovary, unspecified laterality        Relevant Orders    US Non-ob Transvaginal (Completed)    Abnormal uterine bleeding (AUB)        Cyst of right ovary              1. Right ovarian cyst with pain.  Discussed options.  She cannot take LANCE.  WIll proceed with removal of cysts, possible oophorectomy on Monday. She requests opportunistic salpingectomy at that time.  2. AUB - her bleeding has been unpredictable for years.  Discussed ablation now that she is close to 38 and she would like to do this as well.  3. Preop today.    Total time spent today with Regla  was 30-39 minutes (level 4).  Off this time, > 50% was spent face-to-face time coordinating care, answering her questions and counseling regarding pathophysiology of her presenting problem along with plans for any diagnositc work-up and treatment.  Scheduled for surgery and preop done.      Genoveva Sanders MD  03/10/2023

## 2023-03-11 LAB
BASOPHILS # BLD AUTO: 0.03 10*3/MM3 (ref 0–0.2)
BASOPHILS NFR BLD AUTO: 0.5 % (ref 0–1.5)
BUN SERPL-MCNC: 10 MG/DL (ref 6–20)
BUN/CREAT SERPL: 10.8 (ref 7–25)
CALCIUM SERPL-MCNC: 9.9 MG/DL (ref 8.6–10.5)
CHLORIDE SERPL-SCNC: 105 MMOL/L (ref 98–107)
CO2 SERPL-SCNC: 27.1 MMOL/L (ref 22–29)
CREAT SERPL-MCNC: 0.93 MG/DL (ref 0.57–1)
EGFRCR SERPLBLD CKD-EPI 2021: 80.3 ML/MIN/1.73
EOSINOPHIL # BLD AUTO: 0.03 10*3/MM3 (ref 0–0.4)
EOSINOPHIL NFR BLD AUTO: 0.5 % (ref 0.3–6.2)
ERYTHROCYTE [DISTWIDTH] IN BLOOD BY AUTOMATED COUNT: 13.2 % (ref 12.3–15.4)
GLUCOSE SERPL-MCNC: 81 MG/DL (ref 65–99)
HCG INTACT+B SERPL-ACNC: <1 MIU/ML
HCT VFR BLD AUTO: 42.6 % (ref 34–46.6)
HGB BLD-MCNC: 14.1 G/DL (ref 12–15.9)
IMM GRANULOCYTES # BLD AUTO: 0.02 10*3/MM3 (ref 0–0.05)
IMM GRANULOCYTES NFR BLD AUTO: 0.3 % (ref 0–0.5)
LYMPHOCYTES # BLD AUTO: 1.7 10*3/MM3 (ref 0.7–3.1)
LYMPHOCYTES NFR BLD AUTO: 28.6 % (ref 19.6–45.3)
MCH RBC QN AUTO: 30.9 PG (ref 26.6–33)
MCHC RBC AUTO-ENTMCNC: 33.1 G/DL (ref 31.5–35.7)
MCV RBC AUTO: 93.4 FL (ref 79–97)
MONOCYTES # BLD AUTO: 0.48 10*3/MM3 (ref 0.1–0.9)
MONOCYTES NFR BLD AUTO: 8.1 % (ref 5–12)
NEUTROPHILS # BLD AUTO: 3.68 10*3/MM3 (ref 1.7–7)
NEUTROPHILS NFR BLD AUTO: 62 % (ref 42.7–76)
NRBC BLD AUTO-RTO: 0 /100 WBC (ref 0–0.2)
PLATELET # BLD AUTO: 226 10*3/MM3 (ref 140–450)
POTASSIUM SERPL-SCNC: 4.3 MMOL/L (ref 3.5–5.2)
RBC # BLD AUTO: 4.56 10*6/MM3 (ref 3.77–5.28)
SODIUM SERPL-SCNC: 142 MMOL/L (ref 136–145)
T4 FREE SERPL-MCNC: 1.41 NG/DL (ref 0.93–1.7)
TSH SERPL DL<=0.005 MIU/L-ACNC: 3.25 UIU/ML (ref 0.27–4.2)
WBC # BLD AUTO: 5.94 10*3/MM3 (ref 3.4–10.8)

## 2023-03-13 ENCOUNTER — OUTSIDE FACILITY SERVICE (OUTPATIENT)
Dept: OBSTETRICS AND GYNECOLOGY | Facility: CLINIC | Age: 38
End: 2023-03-13
Payer: COMMERCIAL

## 2023-03-13 ENCOUNTER — LAB REQUISITION (OUTPATIENT)
Dept: LAB | Facility: HOSPITAL | Age: 38
End: 2023-03-13
Payer: COMMERCIAL

## 2023-03-13 DIAGNOSIS — R10.2 PELVIC AND PERINEAL PAIN: ICD-10-CM

## 2023-03-13 DIAGNOSIS — G89.18 POST-OP PAIN: Primary | ICD-10-CM

## 2023-03-13 LAB — REF LAB TEST METHOD: NORMAL

## 2023-03-13 PROCEDURE — 88305 TISSUE EXAM BY PATHOLOGIST: CPT | Performed by: OBSTETRICS & GYNECOLOGY

## 2023-03-13 RX ORDER — OXYCODONE HYDROCHLORIDE AND ACETAMINOPHEN 5; 325 MG/1; MG/1
1 TABLET ORAL EVERY 4 HOURS PRN
Qty: 15 TABLET | Refills: 0 | Status: SHIPPED | OUTPATIENT
Start: 2023-03-13 | End: 2023-03-24

## 2023-03-13 RX ORDER — HYDROCODONE BITARTRATE AND ACETAMINOPHEN 5; 325 MG/1; MG/1
1 TABLET ORAL EVERY 4 HOURS PRN
Qty: 15 TABLET | Refills: 0 | Status: SHIPPED | OUTPATIENT
Start: 2023-03-13 | End: 2023-03-24

## 2023-03-16 LAB — REF LAB TEST METHOD: NORMAL

## 2023-03-23 ENCOUNTER — CLINICAL SUPPORT (OUTPATIENT)
Dept: NEUROLOGY | Facility: CLINIC | Age: 38
End: 2023-03-23
Payer: COMMERCIAL

## 2023-03-23 DIAGNOSIS — G43.119 INTRACTABLE MIGRAINE WITH AURA WITHOUT STATUS MIGRAINOSUS: ICD-10-CM

## 2023-03-23 DIAGNOSIS — G44.229 CHRONIC TENSION-TYPE HEADACHE, NOT INTRACTABLE: Primary | ICD-10-CM

## 2023-03-23 PROCEDURE — 20553 NJX 1/MLT TRIGGER POINTS 3/>: CPT | Performed by: PSYCHIATRY & NEUROLOGY

## 2023-03-23 RX ORDER — TRAZODONE HYDROCHLORIDE 50 MG/1
50 TABLET ORAL NIGHTLY
Qty: 90 TABLET | Refills: 1 | Status: SHIPPED | OUTPATIENT
Start: 2023-03-23 | End: 2023-06-21

## 2023-03-23 NOTE — PROGRESS NOTES
CC: Cervicogenic headaches.  Procedure Note:  1.         Patient was positioned comfortably  2.         Before injections are started, 10 Trigger Points sites are identified throughout the bilateral upper trapezius muscle, levator scapulae, and erector spinae muscles.    3.         Overlying skin area was cleaned with Alcohol swab                                                                                                              4.         Before injection, trigger points sites were palpated for local twitch and referred pain to confirms placement                         5.         Local anesthetic bupivacaine-total of 10 mL is used.  6.         30 gauge ½ inch needle was utilized to ensure patient comfort          7.         I started with the most tender spot in above mentioned Trigger Points   1.   Localize most tender spot within taut muscle-fibers  2.   Fix tender spot between fingers (1-2 cm in size)   1.   Prevents from rolling away from needle  2.   Controls subcutaneous bleeding  3.   Before injection, patient was instructed of possible pain on injection  4.   Direct needle at 30 degree angle off skin   8.         Insert needle into skin 1-2 cm from Trigger Point   9.         Advance needle into Trigger Point   10.       Use 1cc anesthetic at each Trigger Points identified in step #2  11.       Redirect needle and reinject                                                                                              1.   Withdraw needle to subcutaneous tissue  2.   Redirect needle into adjacent tender areas  3.   Repeat until local twitch or tautness resolves  12.   After each of the 10 injections I held direct pressure at injection site for 2 minutes to prevents hematoma formation  13.   The same procedure was repeated for other Tender Points located in the upper trapezius muscle, levator scapulae and erector spinae bilaterally  14.   Patient was instructed to gently stretch injected areas to full  active range of motion in all directions and was instructed to repeat range of motion three times after injection  15.   Post-Procedure patient was instructed to avoid over-using injected area for 3-4 days   1.   Maintain active range of motion of injected muscle  2.   Patient applies ice to injected areas for a few hours  3.   Anticipate post-injection soreness for 3-4 days  There was no evidence of complications from injection was noted such as local Skin Infection  at injection site or local hematoma at injection site    Note: Nurtec is working well as an abortive treatments which will be continued.  She is using trazodone 25 mg at bedtime which is helping with sleep.  I will see her back in 12 weeks for next injection.

## 2023-03-24 ENCOUNTER — OFFICE VISIT (OUTPATIENT)
Dept: OBSTETRICS AND GYNECOLOGY | Facility: CLINIC | Age: 38
End: 2023-03-24
Payer: COMMERCIAL

## 2023-03-24 VITALS
DIASTOLIC BLOOD PRESSURE: 70 MMHG | HEIGHT: 63 IN | WEIGHT: 123 LBS | SYSTOLIC BLOOD PRESSURE: 108 MMHG | BODY MASS INDEX: 21.79 KG/M2

## 2023-03-24 DIAGNOSIS — Z90.79 STATUS POST BILATERAL SALPINGECTOMY: ICD-10-CM

## 2023-03-24 DIAGNOSIS — Z98.890 STATUS POST ENDOMETRIAL ABLATION: ICD-10-CM

## 2023-03-24 DIAGNOSIS — Z90.721 STATUS POST RIGHT OOPHORECTOMY: ICD-10-CM

## 2023-03-24 DIAGNOSIS — Z48.89 POSTOPERATIVE VISIT: Primary | ICD-10-CM

## 2023-03-24 NOTE — PROGRESS NOTES
OBGYN Postoperative Exam Note          Subjective   Chief Complaint   Patient presents with   • Post-op Follow-up     Regla West is a 37 y.o. year old  presenting to be seen for her post-operative visit. She is S/P diagnostic hysteroscopy, Novasure endometrial ablation, D&C, diagnostic laparoscopy with salpingectomy, and Right oophorectomy on 3/13/2023 at [facility] for Right ovarian cyst (7cm), RLQ pain, menorrhagia, endometriosis, and opportunistic salpingectomy. Currently she reports pain is well controlled, constipation, bloating, and slight tenderness at incision sight.    The pathology results from her procedure are in Regla's record and are benign.      OVARY AND FALLOPIAN TUBE, BILATERAL:  Ovary with benign corpus luteum cyst  Fallopian tubes (bilateral) with no significant pathologic abnormality  Negative for malignancy  B. ENDOMETRIUM, CURETTINGS:  Secretory phase endometrium with focal stromal breakdown  Negative for atypia and malignancy    OTHER THINGS SHE WANTS TO DISCUSS TODAY:  Nothing else      Current Outpatient Medications:   •  ALPRAZolam (XANAX) 0.25 MG tablet, Take 1 tablet by mouth Daily as needed for panic, Disp: 30 tablet, Rfl: 0  •  Calcium-Phosphorus-Vitamin D (CALCIUM/VITAMIN D3/ADULT GUMMY PO), Take  by mouth., Disp: , Rfl:   •  clindamycin (CLEOCIN T) 1 % external solution, Apply topically to the affected area on face as directed 2 (two) times a day., Disp: 60 mL, Rfl: 11  •  escitalopram (Lexapro) 10 MG tablet, Take 1 tablet by mouth Daily., Disp: 90 tablet, Rfl: 3  •  levothyroxine (SYNTHROID, LEVOTHROID) 88 MCG tablet, Take 1 tablet by mouth Daily., Disp: 90 tablet, Rfl: 3  •  lidocaine (LIDODERM) 5 %, Place 1 patch on the skin as directed by provider Daily. Remove & Discard patch within 12 hours or as directed by MD (Patient taking differently: Place 1 patch on the skin as directed by provider As Needed. Remove & Discard patch within 12 hours or as directed by MD),  "Disp: 30 patch, Rfl: 3  •  loratadine (CLARITIN) 10 MG tablet, Take 1 tablet by mouth Daily., Disp: , Rfl:   •  melatonin 5 MG tablet tablet, Take 1 tablet by mouth Every Night., Disp: , Rfl:   •  Multiple Vitamins-Minerals (HAIR SKIN AND NAILS FORMULA PO), Take  by mouth., Disp: , Rfl:   •  Rimegepant Sulfate (Nurtec) 75 MG tablet dispersible tablet, Place 1 tablet on the tongue Daily As Needed for migraine., Disp: 8 tablet, Rfl: 3  •  traZODone (DESYREL) 50 MG tablet, Take 1 tablet by mouth Every Night for 90 days., Disp: 90 tablet, Rfl: 1     Past Medical History:   Diagnosis Date   • Anemia     Taking iron   • Hashimoto's thyroiditis    • Headache    • Hypothyroidism    • Migraine    • Pain in joint, lower leg         Past Surgical History:   Procedure Laterality Date   •  SECTION WITH TUBAL Bilateral 2018    Procedure:  SECTION PRIMARY WITH TUBAL * 39 WKS *;  Surgeon: Cinda Greenberg MD;  Location: [facility] DELIVERY;  Service: Obstetrics/Gynecology   • D & C HYSTEROSCOPY ENDOMETRIAL ABLATION DIAGNOSTIC LAPAROSCOPY  2023   • OOPHORECTOMY Right 2023   • SALPINGECTOMY Bilateral 2023   • SEPTOPLASTY         • WISDOM TOOTH EXTRACTION         The following portions of the patient's history were reviewed and updated as appropriate:current medications and allergies    Review of Systems   Constitutional: Negative.    HENT: Negative.    Eyes: Negative.    Respiratory: Negative.    Cardiovascular: Negative.    Gastrointestinal: Positive for constipation.        Blpating   Endocrine: Negative.    Genitourinary: Positive for pelvic pain.        Tenderness at incision   Musculoskeletal: Negative.    Skin: Negative.    Allergic/Immunologic: Negative.    Neurological: Negative.    Hematological: Negative.    Psychiatric/Behavioral: Negative.           Objective   /70   Ht 160 cm (63\")   Wt 55.8 kg (123 lb)   LMP 03/10/2023 (Approximate)   BMI 21.79 kg/m²     Physical Exam  Vitals " and nursing note reviewed.   Constitutional:       Appearance: She is well-developed.   HENT:      Head: Normocephalic and atraumatic.   Pulmonary:      Effort: Pulmonary effort is normal.   Abdominal:      General: A surgical scar is present.      Palpations: Abdomen is soft. Abdomen is not rigid.      Comments: Clean, Dry, and Intact.  No erythema.    Musculoskeletal:      Cervical back: Normal range of motion.   Neurological:      Mental Status: She is alert and oriented to person, place, and time.   Psychiatric:         Mood and Affect: Mood normal.         Behavior: Behavior normal.              Assessment   1. S/P endometrial ablation, hysteroscopy and bilateral salpingectomy and right oophorectomy     Plan   1. May return to full activity with no restrictions  2. Reviewed surgery and findings.  Encouraged MOM stool softeners prn.             Genoveva Sanders MD  03/24/2023

## 2023-03-29 ENCOUNTER — OFFICE VISIT (OUTPATIENT)
Dept: ORTHOPEDIC SURGERY | Facility: CLINIC | Age: 38
End: 2023-03-29
Payer: COMMERCIAL

## 2023-03-29 VITALS
HEIGHT: 63 IN | BODY MASS INDEX: 21.79 KG/M2 | DIASTOLIC BLOOD PRESSURE: 60 MMHG | WEIGHT: 123 LBS | SYSTOLIC BLOOD PRESSURE: 90 MMHG

## 2023-03-29 DIAGNOSIS — M76.01 GLUTEAL TENDINITIS OF BOTH BUTTOCKS: ICD-10-CM

## 2023-03-29 DIAGNOSIS — M76.02 GLUTEAL TENDINITIS OF BOTH BUTTOCKS: ICD-10-CM

## 2023-03-29 DIAGNOSIS — M43.07 SPONDYLOLYSIS OF LUMBOSACRAL REGION: Primary | ICD-10-CM

## 2023-03-29 NOTE — PROGRESS NOTES
[facility] Follow Up Visit     Office Follow Up      Date: 03/29/2023   Patient Name: Regla West  MRN: [id number]  YOB: 1985    Referring Physician: Trace Urena MD     Chief Complaint:   Chief Complaint   Patient presents with   • Follow-up     3 week MRI f/u-- Piriformis syndrome of right side      History of Present Illness: Regla West is a 37 y.o. female who is here today for follow up on bilateral hip pain.  At last visit, her symptoms had worsened and moved to the posterior hip from the lateral hip.  She described pain down her leg.  We will concern for sciatica and obtain MRI of the right leg.  She is here today to discuss those results.  Pain is still a 3/10 and unchanged since the last visit.  It does limit her physical activity level.    Subjective   Review of Systems: Review of Systems   Constitutional: Negative for chills, fever, unexpected weight gain and unexpected weight loss.   HENT: Negative for congestion, postnasal drip and rhinorrhea.    Eyes: Negative for blurred vision.   Respiratory: Negative for shortness of breath.    Cardiovascular: Negative for leg swelling.   Gastrointestinal: Negative for abdominal pain, nausea and vomiting.   Genitourinary: Negative for difficulty urinating.   Musculoskeletal: Positive for arthralgias. Negative for gait problem, joint swelling and myalgias.   Skin: Negative for skin lesions and wound.   Neurological: Negative for dizziness, weakness, light-headedness and numbness.   Hematological: Does not bruise/bleed easily.   Psychiatric/Behavioral: Negative for depressed mood.        Medications:   Current Outpatient Medications:   •  ALPRAZolam (XANAX) 0.25 MG tablet, Take 1 tablet by mouth Daily as needed for panic, Disp: 30 tablet, Rfl: 0  •  Calcium-Phosphorus-Vitamin D (CALCIUM/VITAMIN D3/ADULT GUMMY PO), Take  by mouth., Disp: , Rfl:   •  clindamycin (CLEOCIN T) 1 %  "external solution, Apply topically to the affected area on face as directed 2 (two) times a day., Disp: 60 mL, Rfl: 11  •  escitalopram (Lexapro) 10 MG tablet, Take 1 tablet by mouth Daily., Disp: 90 tablet, Rfl: 3  •  levothyroxine (SYNTHROID, LEVOTHROID) 88 MCG tablet, Take 1 tablet by mouth Daily., Disp: 90 tablet, Rfl: 3  •  lidocaine (LIDODERM) 5 %, Place 1 patch on the skin as directed by provider Daily. Remove & Discard patch within 12 hours or as directed by MD (Patient taking differently: Place 1 patch on the skin as directed by provider As Needed. Remove & Discard patch within 12 hours or as directed by MD), Disp: 30 patch, Rfl: 3  •  loratadine (CLARITIN) 10 MG tablet, Take 1 tablet by mouth Daily., Disp: , Rfl:   •  melatonin 5 MG tablet tablet, Take 1 tablet by mouth Every Night., Disp: , Rfl:   •  Multiple Vitamins-Minerals (HAIR SKIN AND NAILS FORMULA PO), Take  by mouth., Disp: , Rfl:   •  Rimegepant Sulfate (Nurtec) 75 MG tablet dispersible tablet, Place 1 tablet on the tongue Daily As Needed for migraine., Disp: 8 tablet, Rfl: 3  •  traZODone (DESYREL) 50 MG tablet, Take 1 tablet by mouth Every Night for 90 days., Disp: 90 tablet, Rfl: 1    Allergies:   Allergies   Allergen Reactions   • Macrobid [Nitrofurantoin Monohyd Macro] Rash       I have reviewed and updated the patient's chief complaint, history of present illness, review of systems, past medical history, surgical history, family history, social history, medications and allergy list as appropriate.     Objective    Vital Signs:   Vitals:    03/29/23 0735   BP: 90/60   Weight: 55.8 kg (123 lb)   Height: 160 cm (62.99\")     Body mass index is 21.79 kg/m².  BMI is within normal parameters. No other follow-up for BMI required.    Patient reports that she is a non-smoker and has not ever been a smoker.  This behavior was applauded and she was encouraged to continue in smoking cessation.  We will continue to monitor at subsequent " visits.     Ortho Exam:  Constitutional: General Appearance: healthy-appearing, NAD, and normal body habitus.   Psychiatric: Mood and Affect: normal affect and mood and active and alert.   Gait and Station: Appearance: ambulating with no assistive devices.   Cardiovascular System: Arterial Pulses Right: dorsalis pedis pulse normal and posterior tibialis pulse normal. Arterial Pulses Left: dorsalis pedis pulse normal and posterior tibialis pulse normal. Edema Right: none. Edema Left: none.   Lumbar Spine: Inspection: normal alignment. Bony Palpation: no tenderness of the spinous process, the transverse process, the paraspinals, or the coccyx and tenderness of the sacrum. Special Tests: seated straight leg raising test positive.   Hip/Pelvis Appearance: Inspection: normal axial alignment.   Hips: Bony Palpation Right: no tenderness of the iliac crest, the ASIS, the PSIS, the pubic tubercle, the sciatic notch, the ischial tuberosity, the SI joint, or the greater trochanter. Soft Tissue Palpation Right: no tenderness of the hip flexor muscles, the hip adductor muscles, the biceps femoris muscle, the semimembranous muscle, or the semitendinous muscle and tenderness of the piriformis. Active Range of Motion Right: normal, flexion normal, extension normal, internal rotation normal, and external rotation normal. Passive Range of Motion Right: no flexion contracture, hamstring tightness popliteal angle, or pain with motion and normal, flexion normal, extension normal, internal rotation normal, and external rotation normal. Special Tests Right: Ron-Fabere test positive; negative FADIR, axial load. Strength Right: normal 5/5.   Skin: Right Lower Extremity: normal.    Results Review:   Imaging Results (Last 24 Hours)     ** No results found for the last 24 hours. **      MRI Hip Right Without Contrast (03/09/2023 10:01)  I personally reviewed the MRI of the right hip.  Results show no evidence of fracture dislocation stress  injury or stress reaction from bony standpoint.  No osseous necrosis.  No labral tear.  No myotendinous avulsion.  She does have a right ovarian cyst with a small amount of free fluid.  Importantly, there is bilateral spondylolysis at L5 and neuroforaminal narrowing at L5-S1.    Procedures    Assessment / Plan    Assessment/Plan:   Diagnoses and all orders for this visit:    1. Spondylolysis of lumbosacral region (Primary)    2. Gluteal tendinitis of both buttocks      Follow-up on bilateral hip pain.  MRI results show no bony abnormality or overall hip issue.  There is spondylolysis of L5 and neuroforaminal narrowing at L5-S1.  She did also have an incidental finding of an ovarian cyst that she has since had removed.  The tendinitis in her gluteal muscles and lateral hip is resolved.  She still has occasional pain in the low back that is worsened by physical activity.  She does occasionally get radicular symptoms down her leg.  She has modified her activity and this has reduced her overall pain level.  I recommended gradual return to exercise but avoid running on hard surfaces and other impact exercises such as jumping.  I believe this will flareup her symptoms.  I am happy to refer her for further management of her lumbar pain but she would like to defer at this time as it is currently manageable with her current plan.  Would have to see her back anytime for any orthopedic issue but this point we will be as needed.    Follow Up:   Return if symptoms worsen or fail to improve.      Yunior Urena MD  [facility]

## 2023-05-10 ENCOUNTER — SPECIALTY PHARMACY (OUTPATIENT)
Dept: PHARMACY | Facility: TELEHEALTH | Age: 38
End: 2023-05-10
Payer: COMMERCIAL

## 2023-05-10 NOTE — PROGRESS NOTES
Specialty Pharmacy Patient Management Program  Call Center Refill Outreach      Regla is a 37 y.o. female contacted today regarding refills of her medication(s).    Specialty medication(s) and dose(s) confirmed: Nurtec  Other medications being refilled: N/A    Refill Questions    Flowsheet Row Most Recent Value   Changes to allergies? No   Changes to medications? No   New conditions since last clinic visit No   Unplanned office visit, urgent care, ED, or hospital admission in the last 4 weeks  No   How does patient/caregiver feel medication is working? Very good   Financial problems or insurance changes  No   If yes, describe changes in insurance or financial issues. N/A   Since the previous refill, were any specialty medication doses or scheduled injections missed or delayed?  No   If yes, please provide the amount N/A   Why were doses missed? N/A   Does this patient require a clinical escalation to a pharmacist? No              Medication Adherence    Adherence tools used: directed education  Support network for adherence: family member            Follow-up: 21 days     Alec De La Torre CPhT  Care Coordinator, Specialty Pharmacy Call Center  5/10/2023  13:18 EDT

## 2023-05-22 ENCOUNTER — SPECIALTY PHARMACY (OUTPATIENT)
Dept: PHARMACY | Facility: TELEHEALTH | Age: 38
End: 2023-05-22
Payer: COMMERCIAL

## 2023-05-22 RX ORDER — RIMEGEPANT SULFATE 75 MG/75MG
75 TABLET, ORALLY DISINTEGRATING ORAL DAILY PRN
Qty: 8 TABLET | Refills: 3 | Status: SHIPPED | OUTPATIENT
Start: 2023-05-22

## 2023-05-22 NOTE — TELEPHONE ENCOUNTER
Rx Refill Note  Requested Prescriptions     Pending Prescriptions Disp Refills   • Rimegepant Sulfate (Nurtec) 75 MG tablet dispersible tablet 8 tablet 3     Sig: Place 1 tablet on the tongue Daily As Needed for migraine.      Last filled:10/26/22 with 3 refills. Sent  Last office visit with prescribing clinician: Visit date not found      Next office visit with prescribing clinician: 8/7/2023     Wesley Uriostegui MA  05/22/23, 11:06 EDT

## 2023-05-26 ENCOUNTER — SPECIALTY PHARMACY (OUTPATIENT)
Dept: PHARMACY | Facility: TELEHEALTH | Age: 38
End: 2023-05-26
Payer: COMMERCIAL

## 2023-05-26 NOTE — PROGRESS NOTES
Specialty Pharmacy Patient Management Program  Reassessment     Regla West is a 37 y.o. female with migraine headaches and enrolled in the Patient Management program offered by [facility]. A follow-up outreach was conducted, including assessment of continued therapy appropriateness, medication adherence, and side effect incidence and management for Nurtec 75mg odt.     Changes to Insurance Coverage or Financial Support  none    Relevant Past Medical History and Comorbidities  Relevant medical history and concomitant health conditions were discussed with the patient. The patient's chart has been reviewed for relevant past medical history and comorbid health conditions and updated as necessary.   Past Medical History:   Diagnosis Date   • Anemia     Taking iron   • Hashimoto's thyroiditis    • Headache    • Hypothyroidism    • Migraine    • Pain in joint, lower leg      Social History     Socioeconomic History   • Marital status:    Tobacco Use   • Smoking status: Never   • Smokeless tobacco: Never   Vaping Use   • Vaping Use: Never used   Substance and Sexual Activity   • Alcohol use: Not Currently     Comment: socially   • Drug use: No   • Sexual activity: Yes     Partners: Male     Birth control/protection: Tubal ligation       Allergies  Known allergies and reactions were discussed with the patient. The patient's chart has been reviewed for allergy information and updated as necessary.   Macrobid [nitrofurantoin monohyd macro]    Relevant Laboratory Values  Lab Results   Component Value Date    GLUCOSE 81 03/10/2023    CALCIUM 9.9 03/10/2023     03/10/2023    K 4.3 03/10/2023    CO2 27.1 03/10/2023     03/10/2023    BUN 10 03/10/2023    CREATININE 0.93 03/10/2023    EGFRRESULT 80.3 03/10/2023    BCR 10.8 03/10/2023    ANIONGAP 10.0 10/06/2021     Lab Results   Component Value Date    WBC 5.94 03/10/2023    HGB 14.1 03/10/2023    HCT 42.6 03/10/2023    MCV 93.4  03/10/2023     03/10/2023       Current Medication List  This medication list has been reviewed with the patient and evaluated for any interactions or necessary modifications/recommendations, and updated to include all prescription medications, OTC medications, and supplements the patient is currently taking. This list reflects what is contained in the patient's profile, which has also been marked as reviewed to communicate to other providers it is the most up to date version of the patient's current medication therapy.     Current Outpatient Medications:   •  ALPRAZolam (XANAX) 0.25 MG tablet, Take 1 tablet by mouth Daily as needed for panic, Disp: 30 tablet, Rfl: 0  •  Calcium-Phosphorus-Vitamin D (CALCIUM/VITAMIN D3/ADULT GUMMY PO), Take  by mouth., Disp: , Rfl:   •  clindamycin (CLEOCIN T) 1 % external solution, Apply topically to the affected area on face as directed 2 (two) times a day., Disp: 60 mL, Rfl: 11  •  escitalopram (Lexapro) 10 MG tablet, Take 1 tablet by mouth Daily., Disp: 90 tablet, Rfl: 3  •  levothyroxine (SYNTHROID, LEVOTHROID) 88 MCG tablet, Take 1 tablet by mouth Daily., Disp: 90 tablet, Rfl: 3  •  lidocaine (LIDODERM) 5 %, Place 1 patch on the skin as directed by provider Daily. Remove & Discard patch within 12 hours or as directed by MD (Patient taking differently: Place 1 patch on the skin as directed by provider As Needed. Remove & Discard patch within 12 hours or as directed by MD), Disp: 30 patch, Rfl: 3  •  loratadine (CLARITIN) 10 MG tablet, Take 1 tablet by mouth Daily., Disp: , Rfl:   •  melatonin 5 MG tablet tablet, Take 1 tablet by mouth Every Night., Disp: , Rfl:   •  Multiple Vitamins-Minerals (HAIR SKIN AND NAILS FORMULA PO), Take  by mouth., Disp: , Rfl:   •  mupirocin (BACTROBAN) 2 % ointment, Apply 1 application topically to the appropriate area as directed 3 (Three) Times a Day., Disp: 22 g, Rfl: 1  •  Rimegepant Sulfate (Nurtec) 75 MG tablet dispersible tablet,  Place 1 tablet on the tongue Daily As Needed for migraine., Disp: 8 tablet, Rfl: 3  •  traZODone (DESYREL) 50 MG tablet, Take 1 tablet by mouth Every Night for 90 days., Disp: 90 tablet, Rfl: 1    Drug Interactions  none     Adverse Drug Reactions  • Adverse Reactions Experienced: none  • Plan for ADR Management: N/A     Hospitalizations and Urgent Care Since Last Assessment  • Hospitalizations or Admissions: none  • ED Visits: none  • Urgent Office Visits: none     Adherence and Self-Administration  • Approximate Number of Doses Missed Since Last Assessment: none  • Ongoing or New Barriers to Patient Adherence and/or Self-Administration: none   • Methods for Supporting Patient Adherence and/or Self-Administration: N/A     Goals of Therapy  Goals related to the patient's specialty therapy were discussed with the patient. The Patient Goals segment of this outreach has been reviewed and updated.   Goals     • Specialty Pharmacy General Goal      Decrease in severity and number of migraines            • Progress Toward Meeting Patient-Identified Goals of Therapy: On Track  o New Patient-Identified Goals, If Applicable:     • Progress Toward Meeting Clinical Goals or Therapeutic Targets: On Track  o New Clinical Goals or Therapeutic Targets, If Applicable:     Quality of Life Assessment   Quality of Life related to the patient's specialty therapy was discussed with the patient. The QOL segment of this outreach has been reviewed and updated.   Quality of Life Assessment  Quality of Life Improvement Scale: Somewhat better  Comments on Quality of Life: tolerating well    Reassessment Plan & Follow-Up  1. Medication Therapy Changes: none  2. Additional Plans, Therapy Recommendations, or Therapy Problems to Be Addressed: none   3. Patient states nurtec works great to relieve her acute migraine headache and no ADRs to report.   4. Pharmacist to perform regular reassessments no more than (6) months from the previous  assessment.  5. Care Coordinator to set up future refill outreaches, coordinate prescription delivery, and escalate clinical questions to pharmacist.     Attestation  I attest that the specialty medication(s) addressed above are appropriate for the patient based on my reassessment. If the prescribed therapy is at any point deemed not appropriate based on the current or future assessments, a consultation will be initiated with the patient's specialty care provider to determine the best course of action. The revised plan of therapy will be documented along with any additional patient education provided.     Electronically signed by Fracisco Santiago RPH, 05/26/23, 2:27 PM EDT.

## 2023-06-19 ENCOUNTER — SPECIALTY PHARMACY (OUTPATIENT)
Dept: PHARMACY | Facility: TELEHEALTH | Age: 38
End: 2023-06-19
Payer: COMMERCIAL

## 2023-08-07 ENCOUNTER — CLINICAL SUPPORT (OUTPATIENT)
Dept: NEUROLOGY | Facility: CLINIC | Age: 38
End: 2023-08-07
Payer: COMMERCIAL

## 2023-08-07 DIAGNOSIS — G44.229 CHRONIC TENSION-TYPE HEADACHE, NOT INTRACTABLE: Primary | ICD-10-CM

## 2023-08-07 DIAGNOSIS — G43.119 INTRACTABLE MIGRAINE WITH AURA WITHOUT STATUS MIGRAINOSUS: ICD-10-CM

## 2023-08-07 RX ORDER — TRAZODONE HYDROCHLORIDE 50 MG/1
50 TABLET ORAL NIGHTLY
Qty: 90 TABLET | Refills: 1 | Status: SHIPPED | OUTPATIENT
Start: 2023-08-07

## 2023-08-07 RX ORDER — RIMEGEPANT SULFATE 75 MG/75MG
75 TABLET, ORALLY DISINTEGRATING ORAL EVERY OTHER DAY
Qty: 16 TABLET | Refills: 3 | Status: SHIPPED | OUTPATIENT
Start: 2023-08-07

## 2023-08-07 NOTE — PROGRESS NOTES
CC: Cervicogenic headaches.  Procedure Note:  1.         Patient was positioned comfortably  2.         Before injections are started, 10 Trigger Points sites are identified throughout the bilateral upper trapezius muscle, levator scapulae, and erector spinae muscles.    3.         Overlying skin area was cleaned with Alcohol swab                                                                                                              4.         Before injection, trigger points sites were palpated for local twitch and referred pain to confirms placement                         5.         Local anesthetic bupivacaine-total of 10 mL is used.  6.         30 gauge « inch needle was utilized to ensure patient comfort          7.         I started with the most tender spot in above mentioned Trigger Points   1.   Localize most tender spot within taut muscle-fibers  2.   Fix tender spot between fingers (1-2 cm in size)   1.   Prevents from rolling away from needle  2.   Controls subcutaneous bleeding  3.   Before injection, patient was instructed of possible pain on injection  4.   Direct needle at 30 degree angle off skin   8.         Insert needle into skin 1-2 cm from Trigger Point   9.         Advance needle into Trigger Point   10.       Use 1cc anesthetic at each Trigger Points identified in step #2  11.       Redirect needle and reinject                                                                                              1.   Withdraw needle to subcutaneous tissue  2.   Redirect needle into adjacent tender areas  3.   Repeat until local twitch or tautness resolves  12.   After each of the 10 injections I held direct pressure at injection site for 2 minutes to prevents hematoma formation  13.   The same procedure was repeated for other Tender Points located in the upper trapezius muscle, levator scapulae and erector spinae bilaterally  14.   Patient was instructed to gently stretch injected areas to full  active range of motion in all directions and was instructed to repeat range of motion three times after injection  15.   Post-Procedure patient was instructed to avoid over-using injected area for 3-4 days   1.   Maintain active range of motion of injected muscle  2.   Patient applies ice to injected areas for a few hours  3.   Anticipate post-injection soreness for 3-4 days  There was no evidence of complications from injection was noted such as local Skin Infection  at injection site or local hematoma at injection site    Note: Nurtec is working well as an abortive treatments which will be continued.  She is using trazodone 25 mg at bedtime which is helping with sleep.  I will see her back in 12 weeks for next injection.

## 2023-08-22 DIAGNOSIS — F41.1 GENERALIZED ANXIETY DISORDER: ICD-10-CM

## 2023-08-22 RX ORDER — ESCITALOPRAM OXALATE 10 MG/1
10 TABLET ORAL DAILY
Qty: 90 TABLET | Refills: 3 | Status: SHIPPED | OUTPATIENT
Start: 2023-08-22

## 2023-08-28 ENCOUNTER — DOCUMENTATION (OUTPATIENT)
Dept: NEUROLOGY | Facility: CLINIC | Age: 38
End: 2023-08-28
Payer: COMMERCIAL

## 2023-08-30 ENCOUNTER — SPECIALTY PHARMACY (OUTPATIENT)
Dept: NEUROLOGY | Facility: CLINIC | Age: 38
End: 2023-08-30
Payer: COMMERCIAL

## 2023-09-15 ENCOUNTER — HOSPITAL ENCOUNTER (EMERGENCY)
Facility: HOSPITAL | Age: 38
Discharge: HOME | End: 2023-09-15
Attending: EMERGENCY MEDICINE
Payer: COMMERCIAL

## 2023-09-15 VITALS
SYSTOLIC BLOOD PRESSURE: 98 MMHG | OXYGEN SATURATION: 99 % | DIASTOLIC BLOOD PRESSURE: 67 MMHG | TEMPERATURE: 97.4 F | RESPIRATION RATE: 20 BRPM | WEIGHT: 123 LBS | HEIGHT: 63 IN | BODY MASS INDEX: 21.79 KG/M2 | HEART RATE: 98 BPM

## 2023-09-15 DIAGNOSIS — T50.905A ADVERSE EFFECT OF DRUG, INITIAL ENCOUNTER: ICD-10-CM

## 2023-09-15 DIAGNOSIS — E86.0 DEHYDRATION: ICD-10-CM

## 2023-09-15 DIAGNOSIS — R19.7 NAUSEA VOMITING AND DIARRHEA: Primary | ICD-10-CM

## 2023-09-15 DIAGNOSIS — R09.81 SINUS CONGESTION: ICD-10-CM

## 2023-09-15 DIAGNOSIS — R11.2 NAUSEA VOMITING AND DIARRHEA: Primary | ICD-10-CM

## 2023-09-15 LAB
ALBUMIN SERPL-MCNC: 4.9 G/DL (ref 3.5–5.2)
ALBUMIN/GLOB SERPL: 1.3 G/DL
ALP SERPL-CCNC: 72 U/L (ref 39–117)
ALT SERPL W P-5'-P-CCNC: 16 U/L (ref 1–33)
ANION GAP SERPL CALCULATED.3IONS-SCNC: 14 MMOL/L (ref 5–15)
AST SERPL-CCNC: 24 U/L (ref 1–32)
B-HCG UR QL: NEGATIVE
BACTERIA UR QL AUTO: ABNORMAL /HPF
BASOPHILS # BLD AUTO: 0.03 10*3/MM3 (ref 0–0.2)
BASOPHILS NFR BLD AUTO: 0.3 % (ref 0–1.5)
BILIRUB SERPL-MCNC: 0.4 MG/DL (ref 0–1.2)
BILIRUB UR QL STRIP: NEGATIVE
BUN SERPL-MCNC: 14 MG/DL (ref 6–20)
BUN/CREAT SERPL: 13.9 (ref 7–25)
CALCIUM SPEC-SCNC: 10 MG/DL (ref 8.6–10.5)
CHLORIDE SERPL-SCNC: 100 MMOL/L (ref 98–107)
CLARITY UR: ABNORMAL
CO2 SERPL-SCNC: 25 MMOL/L (ref 22–29)
COLOR UR: YELLOW
CREAT SERPL-MCNC: 1.01 MG/DL (ref 0.57–1)
DEPRECATED RDW RBC AUTO: 45.4 FL (ref 37–54)
EGFRCR SERPLBLD CKD-EPI 2021: 72.8 ML/MIN/1.73
EOSINOPHIL # BLD AUTO: 0.1 10*3/MM3 (ref 0–0.4)
EOSINOPHIL NFR BLD AUTO: 1.1 % (ref 0.3–6.2)
ERYTHROCYTE [DISTWIDTH] IN BLOOD BY AUTOMATED COUNT: 12.7 % (ref 12.3–15.4)
EXPIRATION DATE: NORMAL
GLOBULIN UR ELPH-MCNC: 3.8 GM/DL
GLUCOSE SERPL-MCNC: 173 MG/DL (ref 65–99)
GLUCOSE UR STRIP-MCNC: NEGATIVE MG/DL
HCT VFR BLD AUTO: 51.5 % (ref 34–46.6)
HGB BLD-MCNC: 16.6 G/DL (ref 12–15.9)
HGB UR QL STRIP.AUTO: NEGATIVE
HOLD SPECIMEN: NORMAL
HYALINE CASTS UR QL AUTO: ABNORMAL /LPF
IMM GRANULOCYTES # BLD AUTO: 0.03 10*3/MM3 (ref 0–0.05)
IMM GRANULOCYTES NFR BLD AUTO: 0.3 % (ref 0–0.5)
INTERNAL NEGATIVE CONTROL: NORMAL
INTERNAL POSITIVE CONTROL: NORMAL
KETONES UR QL STRIP: ABNORMAL
LEUKOCYTE ESTERASE UR QL STRIP.AUTO: NEGATIVE
LIPASE SERPL-CCNC: 41 U/L (ref 13–60)
LYMPHOCYTES # BLD AUTO: 0.56 10*3/MM3 (ref 0.7–3.1)
LYMPHOCYTES NFR BLD AUTO: 6.2 % (ref 19.6–45.3)
Lab: NORMAL
MCH RBC QN AUTO: 31 PG (ref 26.6–33)
MCHC RBC AUTO-ENTMCNC: 32.2 G/DL (ref 31.5–35.7)
MCV RBC AUTO: 96.1 FL (ref 79–97)
MONOCYTES # BLD AUTO: 0.64 10*3/MM3 (ref 0.1–0.9)
MONOCYTES NFR BLD AUTO: 7.1 % (ref 5–12)
NEUTROPHILS NFR BLD AUTO: 7.63 10*3/MM3 (ref 1.7–7)
NEUTROPHILS NFR BLD AUTO: 85 % (ref 42.7–76)
NITRITE UR QL STRIP: NEGATIVE
NRBC BLD AUTO-RTO: 0 /100 WBC (ref 0–0.2)
PH UR STRIP.AUTO: <=5 [PH] (ref 5–8)
PLATELET # BLD AUTO: 234 10*3/MM3 (ref 140–450)
PMV BLD AUTO: 9.2 FL (ref 6–12)
POTASSIUM SERPL-SCNC: 4.2 MMOL/L (ref 3.5–5.2)
PROT SERPL-MCNC: 8.7 G/DL (ref 6–8.5)
PROT UR QL STRIP: ABNORMAL
RBC # BLD AUTO: 5.36 10*6/MM3 (ref 3.77–5.28)
RBC # UR STRIP: ABNORMAL /HPF
REF LAB TEST METHOD: ABNORMAL
SODIUM SERPL-SCNC: 139 MMOL/L (ref 136–145)
SP GR UR STRIP: 1.03 (ref 1–1.03)
SQUAMOUS #/AREA URNS HPF: ABNORMAL /HPF
UROBILINOGEN UR QL STRIP: ABNORMAL
WBC # UR STRIP: ABNORMAL /HPF
WBC NRBC COR # BLD: 8.99 10*3/MM3 (ref 3.4–10.8)
WHOLE BLOOD HOLD COAG: NORMAL
WHOLE BLOOD HOLD SPECIMEN: NORMAL

## 2023-09-15 PROCEDURE — 85025 COMPLETE CBC W/AUTO DIFF WBC: CPT | Performed by: EMERGENCY MEDICINE

## 2023-09-15 PROCEDURE — 99283 EMERGENCY DEPT VISIT LOW MDM: CPT

## 2023-09-15 PROCEDURE — 81001 URINALYSIS AUTO W/SCOPE: CPT | Performed by: EMERGENCY MEDICINE

## 2023-09-15 PROCEDURE — 80053 COMPREHEN METABOLIC PANEL: CPT | Performed by: EMERGENCY MEDICINE

## 2023-09-15 PROCEDURE — 83690 ASSAY OF LIPASE: CPT | Performed by: EMERGENCY MEDICINE

## 2023-09-15 PROCEDURE — 25010000002 DEXAMETHASONE SODIUM PHOSPHATE 100 MG/10ML SOLUTION: Performed by: EMERGENCY MEDICINE

## 2023-09-15 PROCEDURE — 93005 ELECTROCARDIOGRAM TRACING: CPT | Performed by: EMERGENCY MEDICINE

## 2023-09-15 PROCEDURE — 96365 THER/PROPH/DIAG IV INF INIT: CPT

## 2023-09-15 PROCEDURE — 96375 TX/PRO/DX INJ NEW DRUG ADDON: CPT

## 2023-09-15 PROCEDURE — 81025 URINE PREGNANCY TEST: CPT | Performed by: EMERGENCY MEDICINE

## 2023-09-15 PROCEDURE — 25010000002 ONDANSETRON PER 1 MG: Performed by: EMERGENCY MEDICINE

## 2023-09-15 RX ORDER — FAMOTIDINE 10 MG/ML
20 INJECTION, SOLUTION INTRAVENOUS ONCE
Status: COMPLETED | OUTPATIENT
Start: 2023-09-15 | End: 2023-09-15

## 2023-09-15 RX ORDER — ONDANSETRON 4 MG/1
4 TABLET, ORALLY DISINTEGRATING ORAL 4 TIMES DAILY PRN
Qty: 15 TABLET | Refills: 0 | Status: SHIPPED | OUTPATIENT
Start: 2023-09-15

## 2023-09-15 RX ORDER — CEFDINIR 300 MG/1
300 CAPSULE ORAL 2 TIMES DAILY
Qty: 14 CAPSULE | Refills: 0 | Status: SHIPPED | OUTPATIENT
Start: 2023-09-15 | End: 2023-09-15

## 2023-09-15 RX ORDER — ONDANSETRON 2 MG/ML
4 INJECTION INTRAMUSCULAR; INTRAVENOUS ONCE
Status: COMPLETED | OUTPATIENT
Start: 2023-09-15 | End: 2023-09-15

## 2023-09-15 RX ORDER — PREDNISONE 20 MG/1
60 TABLET ORAL DAILY
Qty: 12 TABLET | Refills: 0 | Status: SHIPPED | OUTPATIENT
Start: 2023-09-15 | End: 2023-09-20

## 2023-09-15 RX ORDER — DOXYCYCLINE 100 MG/1
100 CAPSULE ORAL 2 TIMES DAILY
Qty: 20 CAPSULE | Refills: 0 | Status: SHIPPED | OUTPATIENT
Start: 2023-09-15 | End: 2023-09-26

## 2023-09-15 RX ORDER — SODIUM CHLORIDE 9 MG/ML
10 INJECTION INTRAVENOUS AS NEEDED
Status: DISCONTINUED | OUTPATIENT
Start: 2023-09-15 | End: 2023-09-15 | Stop reason: HOSPADM

## 2023-09-15 RX ORDER — FAMOTIDINE 20 MG/1
20 TABLET, FILM COATED ORAL 2 TIMES DAILY
Qty: 14 TABLET | Refills: 0 | Status: SHIPPED | OUTPATIENT
Start: 2023-09-15 | End: 2023-09-23

## 2023-09-15 RX ADMIN — SODIUM CHLORIDE 1000 ML: 9 INJECTION, SOLUTION INTRAVENOUS at 18:59

## 2023-09-15 RX ADMIN — DEXAMETHASONE SODIUM PHOSPHATE 10 MG: 10 INJECTION, SOLUTION INTRAMUSCULAR; INTRAVENOUS at 20:05

## 2023-09-15 RX ADMIN — FAMOTIDINE 20 MG: 10 INJECTION INTRAVENOUS at 20:06

## 2023-09-15 RX ADMIN — ONDANSETRON 4 MG: 2 INJECTION INTRAMUSCULAR; INTRAVENOUS at 18:01

## 2023-09-15 RX ADMIN — SODIUM CHLORIDE 1000 ML: 9 INJECTION, SOLUTION INTRAVENOUS at 18:01

## 2023-09-15 NOTE — ED PROVIDER NOTES
[facility]    EMERGENCY DEPARTMENT ENCOUNTER      Pt Name: Regla West  MRN: [id number]  YOB: 1985  Date of evaluation: 9/15/2023  Provider: Pedro Sethi DO    CHIEF COMPLAINT       Chief Complaint   Patient presents with    Vomiting    Syncope     HPI  Stated Reason for Visit: pt arrived from home with  after vomiting and losing consciousness. did not hit head when she fell. no neck or head pain. pt attempted to take OTC zofran but vomited. History Obtained From: patie     HISTORY OF PRESENT ILLNESS  (Location/Symptom, Timing/Onset, Context/Setting, Quality, Duration, Modifying Factors, Severity.)   Regla West is a 37 y.o. female who presents to the emergency department for evaluation with her  secondary to concern for nausea vomiting diarrhea, just overall not feeling well throughout the last day today.  She has had recent URI, family is had recurrent viral illnesses.  She notes sinus pressure and congestion with a history of sinus infections, was recently started on antibiotic for sinusitis and took 1 dose of Omnicef 300 mg prior to some diffuse abdominal pain, nausea vomiting, bilateral flank pain.  She feels this may be a reaction to her medication.  She has had a reaction to Omnicef 600 mg tablets in the past similar nature.  But she believes she is taking the 300 mg tablets before without any side effect.  Feels dehydrated.  Was at her mother's house, got lightheaded with positional changes, had a syncopal episode prior to arrival.  Normal blood pressures for the patient are between  systolic which is her baseline.  She has not had any fevers, she does note generalized chills, just overall not feeling well least over the last couple days.  Denies any other acute systemic complaints at this time.      Nursing notes were reviewed.      PAST MEDICAL HISTORY     Past Medical History:   Diagnosis Date    Anemia     Taking iron    Hashimoto's thyroiditis      Headache     Hypothyroidism     Migraine     Pain in joint, lower leg          SURGICAL HISTORY       Past Surgical History:   Procedure Laterality Date     SECTION WITH TUBAL Bilateral 2018    Procedure:  SECTION PRIMARY WITH TUBAL * 39 WKS *;  Surgeon: Cinda Greenberg MD;  Location: [facility] LABOR DELIVERY;  Service: Obstetrics/Gynecology    D & C HYSTEROSCOPY ENDOMETRIAL ABLATION DIAGNOSTIC LAPAROSCOPY  2023    OOPHORECTOMY Right 2023    SALPINGECTOMY Bilateral 2023    SEPTOPLASTY      2017    WISDOM TOOTH EXTRACTION           CURRENT MEDICATIONS       Current Facility-Administered Medications:     Sodium Chloride (PF) 0.9 % 10 mL, 10 mL, Intravenous, PRN, Matthew Norwood MD    Current Outpatient Medications:     levothyroxine (SYNTHROID, LEVOTHROID) 88 MCG tablet, Take 1 tablet by mouth Daily., Disp: 90 tablet, Rfl: 3    loratadine (CLARITIN) 10 MG tablet, Take 1 tablet by mouth Daily., Disp: , Rfl:     Rimegepant Sulfate (Nurtec) 75 MG tablet dispersible tablet, Take 1 tablet by mouth Every Other Day., Disp: 16 tablet, Rfl: 3    traZODone (DESYREL) 50 MG tablet, Take 1 tablet by mouth Every Night., Disp: 90 tablet, Rfl: 1    ALPRAZolam (XANAX) 0.25 MG tablet, Take 1 tablet by mouth Daily as needed for panic, Disp: 30 tablet, Rfl: 0    Calcium-Phosphorus-Vitamin D (CALCIUM/VITAMIN D3/ADULT GUMMY PO), Take  by mouth., Disp: , Rfl:     clindamycin (CLEOCIN T) 1 % external solution, Apply topically to the affected area on face as directed 2 (two) times a day., Disp: 60 mL, Rfl: 11    doxycycline (MONODOX) 100 MG capsule, Take 1 capsule by mouth 2 (Two) Times a Day for 10 days., Disp: 20 capsule, Rfl: 0    escitalopram (Lexapro) 10 MG tablet, Take 1 tablet by mouth Daily., Disp: 90 tablet, Rfl: 3    famotidine (PEPCID) 20 MG tablet, Take 1 tablet by mouth 2 (Two) Times a Day for 7 days., Disp: 14 tablet, Rfl: 0    lidocaine (LIDODERM) 5 %, Place 1 patch on the skin as directed by  provider Daily. Remove & Discard patch within 12 hours or as directed by MD (Patient taking differently: Place 1 patch on the skin as directed by provider As Needed. Remove & Discard patch within 12 hours or as directed by MD), Disp: 30 patch, Rfl: 3    melatonin 5 MG tablet tablet, Take 1 tablet by mouth Every Night., Disp: , Rfl:     Multiple Vitamins-Minerals (HAIR SKIN AND NAILS FORMULA PO), Take  by mouth., Disp: , Rfl:     mupirocin (BACTROBAN) 2 % ointment, Apply 1 application topically to the appropriate area as directed 3 (Three) Times a Day., Disp: 22 g, Rfl: 1    ondansetron ODT (ZOFRAN-ODT) 4 MG disintegrating tablet, Place 1 tablet on the tongue 4 (Four) Times a Day As Needed for Nausea or Vomiting., Disp: 15 tablet, Rfl: 0    predniSONE (DELTASONE) 20 MG tablet, Take 3 tablets by mouth Daily for 4 days., Disp: 12 tablet, Rfl: 0    triamcinolone (KENALOG) 0.1 % cream, Apply 1 application topically to the appropriate area as directed 2 (Two) Times a Day., Disp: 45 g, Rfl: 1    ALLERGIES     Macrobid [nitrofurantoin monohyd macro] and Omnicef [cefdinir]    FAMILY HISTORY       Family History   Problem Relation Age of Onset    Hypertension Father     Hyperlipidemia Father     Thyroid disease Father     Hyperlipidemia Mother     Breast cancer Neg Hx     Ovarian cancer Neg Hx     Uterine cancer Neg Hx     Colon cancer Neg Hx           SOCIAL HISTORY       Social History     Socioeconomic History    Marital status:    Tobacco Use    Smoking status: Never    Smokeless tobacco: Never   Vaping Use    Vaping Use: Never used   Substance and Sexual Activity    Alcohol use: Not Currently     Comment: socially    Drug use: No    Sexual activity: Yes     Partners: Male     Birth control/protection: Tubal ligation         PHYSICAL EXAM    (up to 7 for level 4, 8 or more for level 5)     Vitals:    09/15/23 1730 09/15/23 1731 09/15/23 1800 09/15/23 1830   BP:  94/74 99/79 98/67   Pulse:  86 102 84   Resp: 20       Temp: 97.4 °F (36.3 °C)      TempSrc: Oral      SpO2:  99% 94% 100%   Weight:       Height:           Physical Exam  General : Patient is awake, alert, oriented, in no acute distress, nontoxic appearing  HEENT: Pupils are equally round, EOMI, conjunctivae clear, there is no injection no icterus.  Oral mucosa is moist, uvula midline  Neck: Neck is supple, full range of motion, trachea midline  Cardiac: Heart regular rate, rhythm, no murmurs, rubs, or gallops  Lungs: Lungs are clear to auscultation, there is no wheezing, rhonchi, or rales. There is no use of accessory muscles  Chest wall: There is no tenderness to palpation over the chest wall or over ribs  Abdomen: Abdomen is soft, nontender, nondistended. There are no firm or pulsatile masses, no rebound rigidity or guarding  Musculoskeletal: 5 out of 5 strength in all 4 extremities.  No focal muscle deficits are appreciated  Neuro: Motor intact, sensory intact, level of consciousness is normal, cerebellar function is normal, reflexes are grossly normal. No evidence of incontinence or loss of bowel or bladder function, no saddle anesthesia noted   Dermatology: Skin is warm and dry  Psych: Mentation is grossly normal, cognition is grossly normal. Affect is appropriate      DIAGNOSTIC RESULTS     EKG:  All EKGs are interpreted by the Emergency Department Physician who either signs or Co-signs this chart in the absence of a cardiologist.    ECG 12 Lead Syncope   Preliminary Result   Test Reason : Syncope   Blood Pressure :   */*   mmHG   Vent. Rate :  87 BPM     Atrial Rate :  87 BPM      P-R Int : 134 ms          QRS Dur :  72 ms       QT Int : 400 ms       P-R-T Axes :  76  71  25 degrees      QTc Int : 481 ms      Normal sinus rhythm   Prolonged QT   Abnormal ECG   When compared with ECG of 16-NOV-2018 00:07,   No significant change was found      Referred By: EDMD           Confirmed By:               ED BEDSIDE ULTRASOUND:   Performed by ED Physician -  none    LABS:    I have reviewed and interpreted all of the currently available lab results from this visit (if applicable):  Results for orders placed or performed during the hospital encounter of 09/15/23   Comprehensive Metabolic Panel    Specimen: Blood   Result Value Ref Range    Glucose 173 (H) 65 - 99 mg/dL    BUN 14 6 - 20 mg/dL    Creatinine 1.01 (H) 0.57 - 1.00 mg/dL    Sodium 139 136 - 145 mmol/L    Potassium 4.2 3.5 - 5.2 mmol/L    Chloride 100 98 - 107 mmol/L    CO2 25.0 22.0 - 29.0 mmol/L    Calcium 10.0 8.6 - 10.5 mg/dL    Total Protein 8.7 (H) 6.0 - 8.5 g/dL    Albumin 4.9 3.5 - 5.2 g/dL    ALT (SGPT) 16 1 - 33 U/L    AST (SGOT) 24 1 - 32 U/L    Alkaline Phosphatase 72 39 - 117 U/L    Total Bilirubin 0.4 0.0 - 1.2 mg/dL    Globulin 3.8 gm/dL    A/G Ratio 1.3 g/dL    BUN/Creatinine Ratio 13.9 7.0 - 25.0    Anion Gap 14.0 5.0 - 15.0 mmol/L    eGFR 72.8 >60.0 mL/min/1.73   Lipase    Specimen: Blood   Result Value Ref Range    Lipase 41 13 - 60 U/L   Urinalysis With Microscopic If Indicated (No Culture) - Urine, Clean Catch    Specimen: Urine, Clean Catch   Result Value Ref Range    Color, UA Yellow Yellow, Straw    Appearance, UA Cloudy (A) Clear    pH, UA <=5.0 5.0 - 8.0    Specific Gravity, UA 1.027 1.001 - 1.030    Glucose, UA Negative Negative    Ketones, UA Trace (A) Negative    Bilirubin, UA Negative Negative    Blood, UA Negative Negative    Protein, UA Trace (A) Negative    Leuk Esterase, UA Negative Negative    Nitrite, UA Negative Negative    Urobilinogen, UA 0.2 E.U./dL 0.2 - 1.0 E.U./dL   CBC Auto Differential    Specimen: Blood   Result Value Ref Range    WBC 8.99 3.40 - 10.80 10*3/mm3    RBC 5.36 (H) 3.77 - 5.28 10*6/mm3    Hemoglobin 16.6 (H) 12.0 - 15.9 g/dL    Hematocrit 51.5 (H) 34.0 - 46.6 %    MCV 96.1 79.0 - 97.0 fL    MCH 31.0 26.6 - 33.0 pg    MCHC 32.2 31.5 - 35.7 g/dL    RDW 12.7 12.3 - 15.4 %    RDW-SD 45.4 37.0 - 54.0 fl    MPV 9.2 6.0 - 12.0 fL    Platelets 234 140 - 450  10*3/mm3    Neutrophil % 85.0 (H) 42.7 - 76.0 %    Lymphocyte % 6.2 (L) 19.6 - 45.3 %    Monocyte % 7.1 5.0 - 12.0 %    Eosinophil % 1.1 0.3 - 6.2 %    Basophil % 0.3 0.0 - 1.5 %    Immature Grans % 0.3 0.0 - 0.5 %    Neutrophils, Absolute 7.63 (H) 1.70 - 7.00 10*3/mm3    Lymphocytes, Absolute 0.56 (L) 0.70 - 3.10 10*3/mm3    Monocytes, Absolute 0.64 0.10 - 0.90 10*3/mm3    Eosinophils, Absolute 0.10 0.00 - 0.40 10*3/mm3    Basophils, Absolute 0.03 0.00 - 0.20 10*3/mm3    Immature Grans, Absolute 0.03 0.00 - 0.05 10*3/mm3    nRBC 0.0 0.0 - 0.2 /100 WBC   Urinalysis, Microscopic Only - Urine, Clean Catch    Specimen: Urine, Clean Catch   Result Value Ref Range    RBC, UA 7-12 (A) None Seen, 0-2 /HPF    WBC, UA 3-5 (A) None Seen, 0-2 /HPF    Bacteria, UA None Seen None Seen, Trace /HPF    Squamous Epithelial Cells, UA 3-6 (A) None Seen, 0-2 /HPF    Hyaline Casts, UA 0-6 0 - 6 /LPF    Methodology Automated Microscopy    POC Urine Pregnancy    Specimen: Urine   Result Value Ref Range    HCG, Urine, QL Negative Negative    Lot Number [id number]     Internal Positive Control Passed Positive, Passed    Internal Negative Control Passed Negative, Passed    Expiration Date 2025-01-30    ECG 12 Lead Syncope   Result Value Ref Range    QT Interval 400 ms    QTC Interval 481 ms   Green Top (Gel)   Result Value Ref Range    Extra Tube Hold for add-ons.    Lavender Top   Result Value Ref Range    Extra Tube hold for add-on    Gold Top - SST   Result Value Ref Range    Extra Tube Hold for add-ons.    Light Blue Top   Result Value Ref Range    Extra Tube Hold for add-ons.         If labs were ordered, I independently reviewed the results and considered them in treating the patient.      EMERGENCY DEPARTMENT COURSE and DIFFERENTIAL DIAGNOSIS/MDM:   Vitals:  AS OF 21:07 EDT    BP - 98/67  HR - 84  TEMP - 97.4 °F (36.3 °C) (Oral)  O2 SATS - 100%      Orders placed during this visit:  Orders Placed This Encounter   Procedures    [place]  Draw    Comprehensive Metabolic Panel    Lipase    Urinalysis With Microscopic If Indicated (No Culture) - Urine, Clean Catch    CBC Auto Differential    Urinalysis, Microscopic Only - Urine, Clean Catch    NPO Diet NPO Type: Strict NPO    Undress & Gown    POC Urine Pregnancy    ECG 12 Lead Syncope    Insert Peripheral IV    CBC & Differential    Green Top (Gel)    Lavender Top    Gold Top - SST    Gray Top    Light Blue Top       All labs have been independently reviewed by me.  All radiology studies have been reviewed by me and the radiologist dictating the report.  All EKG's have been independently viewed and interpreted by me.      Discussion below represents my analysis of pertinent findings related to patient's condition, differential diagnosis, treatment plan and final disposition.    Differential diagnosis:  The differential diagnosis associated with the patient's presentation includes: Medication reaction, gastroenteritis, dehydration, electrolyte abnormality, viral illness    Additional sources  Discussed/ obtained information from independent historians:   [x] Spouse,  at bedside  [] Parent  [] Family member  [] Friend  [] EMS   [] Other:    External (non-ED) record review:   [] Inpatient record:   [] Office record:   [] Outpatient record:   [] Prior Outpatient labs:   [] Prior Outpatient radiology:   [] Primary Care record:   [] Outside ED record:   [] Other:     Patient's care impacted by:   [] Diabetes  [] Hypertension  [] CHF  [] Hyperlipidemia  [] Coronary Artery Disease   [] COPD   [] Cancer   [] Tobacco Abuse   [] Substance Abuse    [] Other:     Care significantly affected by Social Determinants of Health (housing and economic circumstances, unemployment)    [] Yes     [x] No   If yes, Patient's care significantly limited by Social Determinants of Health including:   [] Inadequate housing   [] Low income   [] Alcoholism and drug addiction in family   [] Problems related to primary support  group   [] Unemployment   [] Problems related to employment   [] Other Social Determinants of Health:       MEDICATIONS ADMINISTERED IN ED:  Medications   Sodium Chloride (PF) 0.9 % 10 mL (has no administration in time range)   sodium chloride 0.9 % bolus 1,000 mL (0 mL Intravenous Stopped 9/15/23 1845)   ondansetron (ZOFRAN) injection 4 mg (4 mg Intravenous Given 9/15/23 1801)   sodium chloride 0.9 % bolus 1,000 mL (0 mL Intravenous Stopped 9/15/23 2005)   famotidine (PEPCID) injection 20 mg (20 mg Intravenous Given 9/15/23 2006)   dexAMETHasone (DECADRON) IVPB 10 mg (10 mg Intravenous New Bag 9/15/23 2005)              This is a very pleasant 37-year-old female who has had possible reaction to Omnicef antibiotic which she is being treated for sinusitis.  Had already been battling a viral illness, GI illness with nausea vomiting diarrhea which was acutely exacerbated shortly after taking her oral Omnicef medication.  Was at her mother's house, has positional changes, had a syncopal episode.  Is a history of hypotension, normal systolics around  at her baseline.'s concern for possible dehydration.  On arrival the patient is awake and alert, we did place an IV, patient was given IV fluids, electrolytes, Zofran.  Blood work labs reviewed.  White count of 8.9, hemoglobin 16.6, likely hemoconcentrated, creatinine 1.01 with stable electrolytes, liver function.  No signs of acute infectious etiology.  She was given a couple liters of IV fluids, antiemetic medication.  Patient also has sinus pressure and congestion and likely a reaction to her Omnicef antibiotic.  She was given Pepcid, steroids to try and counteract the medication reaction.  We will transition her over to doxycycline which she has tolerated in the past.  On reassessment she is feeling better, tolerating oral fluids and hydration.  Would feel comfortable being at home, continuing to IV hydrate, advancing her diet as tolerated, prescriptions for sinus  infection, antiemetic, plan for close follow-up with her PCP, return to the ED with any worsening symptoms or further concerns in the meantime.  Patient and family feel comfortable with this plan of care as discussed.    I had a discussion with the patient/family regarding diagnosis, diagnostic results, treatment plan, and medications.  The patient/family indicated understanding of these instructions.  I spent adequate time at the bedside preceding discharge necessary to personally discuss the aftercare instructions, giving patient education, providing explanations of the results of our evaluations/findings, and my decision making to assure that the patient/family understand the plan of care.  Time was allotted to answer questions at that time and throughout the ED course.  Emphasis was placed on timely follow-up after discharge.  I also discussed the potential for the development of an acute emergent condition requiring further evaluation, admission, or even surgical intervention. I discussed that we found nothing during the visit today indicating the need for further workup, admission, or the presence of an unstable medical condition.  I encouraged the patient to return to the emergency department immediately for ANY concerns, worsening, new complaints, or if symptoms persist and unable to seek follow-up in a timely fashion.  The patient/family expressed understanding and agreement with this plan.  The patient will follow-up with their PCP in 1-2 days for reevaluation.     PROCEDURES:  Procedures    CRITICAL CARE TIME    Total Critical Care time was 0 minutes, excluding separately reportable procedures.   There was a high probability of clinically significant/life threatening deterioration in the patient's condition which required my urgent intervention.      FINAL IMPRESSION      1. Nausea vomiting and diarrhea    2. Dehydration    3. Sinus congestion    4. Adverse effect of drug, initial encounter           DISPOSITION/PLAN     ED Disposition       ED Disposition   Discharge    Condition   Stable    Comment   --               PATIENT REFERRED TO:  Kvng Cole PA  [address]  [place] [address]  [phone]    In 2 days      [facility] EMERGENCY DEPARTMENT  [address]  [facility] [address] [phone]    If symptoms worsen      DISCHARGE MEDICATIONS:     Medication List        START taking these medications      doxycycline 100 MG capsule  Commonly known as: MONODOX  Take 1 capsule by mouth 2 (Two) Times a Day for 10 days.     famotidine 20 MG tablet  Commonly known as: PEPCID  Take 1 tablet by mouth 2 (Two) Times a Day for 7 days.     ondansetron ODT 4 MG disintegrating tablet  Commonly known as: ZOFRAN-ODT  Place 1 tablet on the tongue 4 (Four) Times a Day As Needed for Nausea or Vomiting.     predniSONE 20 MG tablet  Commonly known as: DELTASONE  Take 3 tablets by mouth Daily for 4 days.            CHANGE how you take these medications      lidocaine 5 %  Commonly known as: LIDODERM  Place 1 patch on the skin as directed by provider Daily. Remove & Discard patch within 12 hours or as directed by MD  What changed:   when to take this  reasons to take this            CONTINUE taking these medications      ALPRAZolam 0.25 MG tablet  Commonly known as: XANAX  Take 1 tablet by mouth Daily as needed for panic     CALCIUM/VITAMIN D3/ADULT GUMMY PO     clindamycin 1 % external solution  Commonly known as: CLEOCIN T  Apply topically to the affected area on face as directed 2 (two) times a day.     escitalopram 10 MG tablet  Commonly known as: Lexapro  Take 1 tablet by mouth Daily.     levothyroxine 88 MCG tablet  Commonly known as: SYNTHROID, LEVOTHROID  Take 1 tablet by mouth Daily.     loratadine 10 MG tablet  Commonly known as: CLARITIN     melatonin 5 MG tablet tablet     multivitamin with minerals tablet tablet     mupirocin 2 % ointment  Commonly  known as: BACTROBAN  Apply 1 application topically to the appropriate area as directed 3 (Three) Times a Day.     Nurtec 75 MG tablet dispersible tablet  Generic drug: Rimegepant Sulfate  Take 1 tablet by mouth Every Other Day.     traZODone 50 MG tablet  Commonly known as: DESYREL  Take 1 tablet by mouth Every Night.     triamcinolone 0.1 % cream  Commonly known as: KENALOG  Apply 1 application topically to the appropriate area as directed 2 (Two) Times a Day.               Where to Get Your Medications        These medications were sent to [facility] [place] [address]      Hours: Monday to Friday 7 AM to 5:30 PM, Saturday & Sunday 8 AM to 4:30 PM Phone: [phone]   doxycycline 100 MG capsule  famotidine 20 MG tablet  ondansetron ODT 4 MG disintegrating tablet  predniSONE 20 MG tablet             Comment: Please note this report has been produced using speech recognition software.      Pedro Sethi DO  Attending Emergency Physician         Pedro Sethi DO  09/15/23 9216

## 2023-09-16 LAB
QT INTERVAL: 400 MS
QTC INTERVAL: 481 MS

## 2023-10-10 ENCOUNTER — TELEPHONE (OUTPATIENT)
Dept: ENDOCRINOLOGY | Facility: CLINIC | Age: 38
End: 2023-10-10

## 2023-10-10 NOTE — TELEPHONE ENCOUNTER
Caller: Regla West    Relationship to patient: Self    Best call back number: [phone]    Patient is needing: PT WOULD LIKE TO FIND OUT IF SHE CAN DO A TELEHEALTH VIDEO VISIT FOR HER UPCOMING APPT ON 10/12/2023?? PLEASE CALL PT TO CONFIRM.

## 2023-10-10 NOTE — TELEPHONE ENCOUNTER
Patient notified that Dr. Jones does not do Mychart or telehealth appointments.  Patient verbalized understanding.

## 2023-10-18 ENCOUNTER — SPECIALTY PHARMACY (OUTPATIENT)
Dept: ONCOLOGY | Facility: HOSPITAL | Age: 38
End: 2023-10-18
Payer: COMMERCIAL

## 2023-10-18 RX ORDER — RIMEGEPANT SULFATE 75 MG/75MG
75 TABLET, ORALLY DISINTEGRATING ORAL AS NEEDED
Qty: 16 TABLET | Refills: 3 | Status: SHIPPED | OUTPATIENT
Start: 2023-10-18

## 2023-10-18 NOTE — PROGRESS NOTES
Specialty Pharmacy Patient Management Program  Neurology Reassessment     Regla West is a 37 y.o. female with migraines seen by a Neurology provider and enrolled in the Neurology Patient Management program offered by [facility].  A follow-up outreach was conducted, including assessment of continued therapy appropriateness, medication adherence, and side effect incidence and management for Nurtec PRN.     Changes to Insurance Coverage or Financial Support  none     Relevant Past Medical History and Comorbidities  Relevant medical history and concomitant health conditions were discussed with the patient. The patient's chart has been reviewed for relevant past medical history and comorbid health conditions and updated as necessary.   Past Medical History:   Diagnosis Date    Anemia     Taking iron    Hashimoto's thyroiditis     Headache     Hypothyroidism     Migraine     Pain in joint, lower leg      Social History     Socioeconomic History    Marital status:    Tobacco Use    Smoking status: Never    Smokeless tobacco: Never   Vaping Use    Vaping Use: Never used   Substance and Sexual Activity    Alcohol use: Not Currently     Comment: socially    Drug use: No    Sexual activity: Yes     Partners: Male     Birth control/protection: Tubal ligation     Problem list reviewed by Yojana Guillermo, PharmD on 10/18/2023 at 11:48 AM    Hospitalizations and Urgent Care Since Last Assessment  ED Visits, Admissions, or Hospitalizations: none   Urgent Office Visits: none     Allergies  Known allergies and reactions were discussed with the patient. The patient's chart has been reviewed for allergy information and updated as necessary.   Allergies   Allergen Reactions    Macrobid [Nitrofurantoin Monohyd Macro] Rash    Omnicef [Cefdinir] Nausea And Vomiting     Allergies reviewed by Yojana Guillermo, PharmD on 10/18/2023 at 11:35 AM    Relevant Laboratory Values  Common labs           3/10/2023    10:12 6/26/2023    11:49 9/15/2023    17:22   Common Labs   Glucose 81  86  173    BUN 10  11  14    Creatinine 0.93  0.79  1.01    Sodium 142  140  139    Potassium 4.3  3.7  4.2    Chloride 105  103  100    Calcium 9.9  9.7  10.0    Albumin  4.3  4.9    Total Bilirubin  0.2  0.4    Alkaline Phosphatase  58  72    AST (SGOT)  20  24    ALT (SGPT)  14  16    WBC 5.94  5.82  8.99    Hemoglobin 14.1  14.1  16.6    Hematocrit 42.6  42.7  51.5    Platelets 226  205  234    Hemoglobin A1C  4.90         Lab Assessment  The above labs have been reviewed. No dose adjustments are needed for the specialty medication(s) based on the labs.     Current Medication List  This medication list has been reviewed with the patient and evaluated for any interactions or necessary modifications/recommendations, and updated to include all prescription medications, OTC medications, and supplements the patient is currently taking.  This list reflects what is contained in the patient's profile, which has also been marked as reviewed to communicate to other providers it is the most up to date version of the patient's current medication therapy.     Current Outpatient Medications:     Rimegepant Sulfate (Nurtec) 75 MG tablet dispersible tablet, Take 1 tablet by mouth As Needed (.). t the onset of headache, Max of 75 mg/24 hours, Max of 18 tabs/30 days., Disp: 16 tablet, Rfl: 3    ALPRAZolam (XANAX) 0.25 MG tablet, Take 1 tablet by mouth Daily as needed for panic, Disp: 30 tablet, Rfl: 0    Calcium-Phosphorus-Vitamin D (CALCIUM/VITAMIN D3/ADULT GUMMY PO), Take  by mouth., Disp: , Rfl:     clindamycin (CLEOCIN T) 1 % external solution, Apply topically to the affected area on face as directed 2 (two) times a day., Disp: 60 mL, Rfl: 11    escitalopram (Lexapro) 10 MG tablet, Take 1 tablet by mouth Daily., Disp: 90 tablet, Rfl: 3    levothyroxine (SYNTHROID, LEVOTHROID) 88 MCG tablet, Take 1 tablet by mouth Daily., Disp: 90 tablet, Rfl:  3    lidocaine (LIDODERM) 5 %, Place 1 patch on the skin as directed by provider Daily. Remove & Discard patch within 12 hours or as directed by MD (Patient taking differently: Place 1 patch on the skin as directed by provider As Needed. Remove & Discard patch within 12 hours or as directed by MD), Disp: 30 patch, Rfl: 3    loratadine (CLARITIN) 10 MG tablet, Take 1 tablet by mouth Daily., Disp: , Rfl:     melatonin 5 MG tablet tablet, Take 1 tablet by mouth Every Night., Disp: , Rfl:     Multiple Vitamins-Minerals (HAIR SKIN AND NAILS FORMULA PO), Take  by mouth., Disp: , Rfl:     mupirocin (BACTROBAN) 2 % ointment, Apply 1 application topically to the appropriate area as directed 3 (Three) Times a Day., Disp: 22 g, Rfl: 1    ondansetron ODT (ZOFRAN-ODT) 4 MG disintegrating tablet, Place 1 tablet on the tongue up to 4 (Four) Times a Day As Needed for Nausea or Vomiting., Disp: 15 tablet, Rfl: 0    traZODone (DESYREL) 50 MG tablet, Take 1 tablet by mouth Every Night., Disp: 90 tablet, Rfl: 1    triamcinolone (KENALOG) 0.1 % cream, Apply 1 application topically to the appropriate area as directed 2 (Two) Times a Day., Disp: 45 g, Rfl: 1    Medicines reviewed by Yojana Guillermo, PharmD on 10/18/2023 at 11:48 AM    Drug Interactions  none     Adverse Drug Reactions  Medication tolerability: Tolerating with no to minimal ADRs          Medication plan: Continue therapy with normal follow-up  Plan for ADR Management: not required      Adherence, Self-Administration, and Current Therapy Problems  Adherence related patient's specialty therapy was discussed with the patient. The Adherence segment of this outreach has been reviewed and updated.   Adherence Questions  Linked Medication(s) Assessed: Rimegepant Sulfate  On average, how many doses/injections does the patient miss per month?: 0  What are the identified reasons for non-adherence or missed doses? : no problems identfied  What is the estimated medication adherence  level?: %  Based on the patient/caregiver response and refill history, does this patient require an MTP to track adherence improvements?: no    Additional Barriers to Patient Self-Administration: none  Methods for Supporting Patient Self-Administration: not required    Recently Close Medication Therapy Problems  No medication therapy recommendations to display  Open Medication Therapy Problems  No medication therapy recommendations to display     Goals of Therapy  Goals related to the patient's specialty therapy was discussed with the patient. The Patient Goals segment of this outreach has been reviewed and updated.    Goals Addressed Today        Specialty Pharmacy General Goal      Decrease in severity and number of migraines               Quality of Life Assessment   Quality of Life related to the patient's enrollment in the patient management program and services provided was discussed with the patient. The QOL segment of this outreach has been reviewed and updated.   Quality of Life Improvement Scale: A good deal better    Reassessment Plan & Follow-Up  Medication Therapy Changes: none  Related Plans, Therapy Recommendations, or Issues to Be Addressed: none  Pharmacist to perform regular reassessments no more than (6) months from the previous assessment.  Care Coordinator to set up future refill outreaches, coordinate prescription delivery, and escalate clinical questions to pharmacist.     Attestation  Therapeutic appropriateness: Appropriate  I attest the patient was actively involved in and has agreed to the above plan of care. If the prescribed therapy is at any point deemed not appropriate based on the current or future assessments, a consultation will be initiated with the patient's specialty care provider to determine the best course of action. The revised plan of therapy will be documented along with any additional patient education provided. Discussed aforementioned material with patient by  phone.    Yojana Guillermo, PharmD, BCPS  Clinic Specialty Pharmacist, Neurology  10/18/2023  12:03 EDT

## 2023-10-18 NOTE — PROGRESS NOTES
Specialty Pharmacy Refill Coordination Note     Regla is a 37 y.o. female contacted today regarding refills [facility]    Reviewed and verified with patient:  Allergies  Meds  Problems       Specialty medication(s) and dose(s) confirmed: yes        Delivery Questions      Flowsheet Row Most Recent Value   Delivery method  at Pharmacy   Delivery address correct? Yes   Number of medications in delivery 1   Medication(s) being filled and delivered Rimegepant Sulfate   Is there any medication that is due not being filled? No   Cooler needed? No   Do any medications need mixed or dated? No   Copay form of payment Payment plan already set up   Additional comments $0 insurance copay   Are any medications first time fills? No              Medication Adherence          Adherence tools used: directed education      Support network for adherence: family member                Follow-up: 1 month(s)     Yojana Guillermo, PharmD

## 2023-10-24 ENCOUNTER — LAB (OUTPATIENT)
Dept: LAB | Facility: HOSPITAL | Age: 38
End: 2023-10-24
Payer: COMMERCIAL

## 2023-10-24 DIAGNOSIS — E03.9 ACQUIRED HYPOTHYROIDISM: Primary | ICD-10-CM

## 2023-10-24 DIAGNOSIS — E03.9 ACQUIRED HYPOTHYROIDISM: ICD-10-CM

## 2023-10-24 DIAGNOSIS — E06.3 HASHIMOTO'S THYROIDITIS: ICD-10-CM

## 2023-10-24 LAB
T4 FREE SERPL-MCNC: 1.15 NG/DL (ref 0.93–1.7)
TSH SERPL DL<=0.05 MIU/L-ACNC: 2.2 UIU/ML (ref 0.27–4.2)

## 2023-10-24 PROCEDURE — 84439 ASSAY OF FREE THYROXINE: CPT

## 2023-10-24 PROCEDURE — 84443 ASSAY THYROID STIM HORMONE: CPT

## 2023-10-24 PROCEDURE — 36415 COLL VENOUS BLD VENIPUNCTURE: CPT

## 2023-10-25 ENCOUNTER — OFFICE VISIT (OUTPATIENT)
Dept: ENDOCRINOLOGY | Facility: CLINIC | Age: 38
End: 2023-10-25
Payer: COMMERCIAL

## 2023-10-25 VITALS
WEIGHT: 118 LBS | BODY MASS INDEX: 20.91 KG/M2 | HEIGHT: 63 IN | OXYGEN SATURATION: 98 % | SYSTOLIC BLOOD PRESSURE: 104 MMHG | HEART RATE: 80 BPM | DIASTOLIC BLOOD PRESSURE: 70 MMHG

## 2023-10-25 DIAGNOSIS — E03.9 ACQUIRED HYPOTHYROIDISM: Primary | ICD-10-CM

## 2023-10-25 DIAGNOSIS — E06.3 HASHIMOTO'S THYROIDITIS: ICD-10-CM

## 2023-10-25 PROCEDURE — 99213 OFFICE O/P EST LOW 20 MIN: CPT | Performed by: INTERNAL MEDICINE

## 2023-10-25 RX ORDER — LEVOTHYROXINE SODIUM 88 UG/1
88 TABLET ORAL DAILY
Qty: 90 TABLET | Refills: 3 | Status: SHIPPED | OUTPATIENT
Start: 2023-10-25

## 2023-10-25 NOTE — PROGRESS NOTES
"     Office Note      Date: 10/25/2023  Patient Name: Regla West  MRN: [id number]  : 1985    Chief Complaint   Patient presents with    Acquired hypothyroidism       History of Present Illness:   Regla West is a 37 y.o. female who presents for Acquired hypothyroidism    She remains on T4 88mcg qd. She is taking this correctly. She isn't taking any interfering meds concurrently. She hasn't noted any change in the size of her neck. She denies any compressive sxs. She denies any sxs of hypo- or hyperthyroidism at this time, aside from some fatigue.      Subjective      Review of Systems:   Review of Systems   Constitutional: Negative.  Positive for fatigue.   Cardiovascular: Negative.    Gastrointestinal: Negative.    Endocrine: Negative.        The following portions of the patient's history were reviewed and updated as appropriate: allergies, current medications, past family history, past medical history, past social history, past surgical history, and problem list.    Objective     Visit Vitals  /70 (BP Location: Left arm, Patient Position: Sitting, Cuff Size: Adult)   Pulse 80   Ht 160 cm (63\")   Wt 53.5 kg (118 lb)   SpO2 98%   BMI 20.90 kg/m²       Physical Exam:  Physical Exam  Constitutional:       Appearance: Normal appearance.   Neck:      Thyroid: No thyroid mass, thyromegaly or thyroid tenderness.   Lymphadenopathy:      Cervical: No cervical adenopathy.   Neurological:      Mental Status: She is alert.         Labs:    TSH  No results found for: \"TSHBASE\"     Free T4  Free T4   Date Value Ref Range Status   10/24/2023 1.15 0.93 - 1.70 ng/dL Final       T3  No results found for: \"N6HZUXB\"      TPO  Thyroid Peroxidase Antibody   Date Value Ref Range Status   2020 228 (H) 0 - 34 IU/mL Final       TG AB  Thyroglobulin Ab   Date Value Ref Range Status   2020 4.6 (H) 0.0 - 0.9 IU/mL Final     Comment:     Thyroglobulin Antibody measured by [facility] " "      TG  No results found for: \"THYROGLB\"    CBC w/DIFF  Lab Results   Component Value Date    WBC 8.99 09/15/2023    RBC 5.36 (H) 09/15/2023    HGB 16.6 (H) 09/15/2023    HCT 51.5 (H) 09/15/2023    MCV 96.1 09/15/2023    MCH 31.0 09/15/2023    MCHC 32.2 09/15/2023    RDW 12.7 09/15/2023    RDWSD 45.4 09/15/2023    MPV 9.2 09/15/2023     09/15/2023    NEUTRORELPCT 85.0 (H) 09/15/2023    LYMPHORELPCT 6.2 (L) 09/15/2023    MONORELPCT 7.1 09/15/2023    EOSRELPCT 1.1 09/15/2023    BASORELPCT 0.3 09/15/2023    AUTOIGPER 0.3 09/15/2023    NEUTROABS 7.63 (H) 09/15/2023    LYMPHSABS 0.56 (L) 09/15/2023    MONOSABS 0.64 09/15/2023    EOSABS 0.10 09/15/2023    BASOSABS 0.03 09/15/2023    AUTOIGNUM 0.03 09/15/2023    NRBC 0.0 09/15/2023           Assessment / Plan      Assessment & Plan:  Diagnoses and all orders for this visit:    1. Acquired hypothyroidism (Primary)  Assessment & Plan:  TSH at goal at 2.2.  Continue current T4 dose.      2. Hashimoto's thyroiditis  Assessment & Plan:  No goiter on exam.      Other orders  -     levothyroxine (SYNTHROID, LEVOTHROID) 88 MCG tablet; Take 1 tablet by mouth Daily.  Dispense: 90 tablet; Refill: 3      Current Outpatient Medications   Medication Instructions    ALPRAZolam (XANAX) 0.25 MG tablet Take 1 tablet by mouth Daily as needed for panic    Calcium-Phosphorus-Vitamin D (CALCIUM/VITAMIN D3/ADULT GUMMY PO) Oral    clindamycin (CLEOCIN T) 1 % external solution Apply topically to the affected area on face as directed 2 (two) times a day.    escitalopram (LEXAPRO) 10 mg, Oral, Daily    levothyroxine (SYNTHROID, LEVOTHROID) 88 mcg, Oral, Daily    lidocaine (LIDODERM) 5 % 1 patch, Transdermal, Every 24 Hours, Remove & Discard patch within 12 hours or as directed by MD    loratadine (CLARITIN) 10 mg, Oral, Daily    melatonin 5 mg, Oral, Nightly    Multiple Vitamins-Minerals (HAIR SKIN AND NAILS FORMULA PO) Oral    Nurtec 75 mg, Oral, As Needed, t the onset of headache, Max of " 75 mg/24 hours, Max of 18 tabs/30 days.    traZODone (DESYREL) 50 mg, Oral, Nightly    triamcinolone (KENALOG) 0.1 % cream 1 application , Topical, 2 Times Daily      Return in about 1 year (around 10/25/2024) for Recheck with TSH.    Johnny Jones MD   10/25/2023

## 2023-10-27 DIAGNOSIS — L70.0 ACNE VULGARIS: ICD-10-CM

## 2023-10-27 RX ORDER — CLINDAMYCIN PHOSPHATE 11.9 MG/ML
1 SOLUTION TOPICAL 2 TIMES DAILY
Qty: 60 ML | Refills: 11 | Status: SHIPPED | OUTPATIENT
Start: 2023-10-27

## 2023-10-31 ENCOUNTER — PRIOR AUTHORIZATION (OUTPATIENT)
Dept: NEUROLOGY | Facility: CLINIC | Age: 38
End: 2023-10-31
Payer: COMMERCIAL

## 2023-10-31 NOTE — TELEPHONE ENCOUNTER
CELIA/MIRIAM  [id number] AUTH REQ  [id number] AUTH REQ  [id number]- NO AUTH REQ    INSURANCE  ALEX LITTLE            DONE AUTH ONLINE THROUGH [employer]  Transaction ID: [id number]   Customer ID: [id number]

## 2023-11-08 ENCOUNTER — TELEPHONE (OUTPATIENT)
Dept: NEUROLOGY | Facility: CLINIC | Age: 38
End: 2023-11-08

## 2023-11-08 ENCOUNTER — CLINICAL SUPPORT (OUTPATIENT)
Dept: NEUROLOGY | Facility: CLINIC | Age: 38
End: 2023-11-08
Payer: COMMERCIAL

## 2023-11-08 DIAGNOSIS — G44.229 CHRONIC TENSION-TYPE HEADACHE, NOT INTRACTABLE: Primary | ICD-10-CM

## 2023-11-27 ENCOUNTER — SPECIALTY PHARMACY (OUTPATIENT)
Dept: NEUROLOGY | Facility: CLINIC | Age: 38
End: 2023-11-27
Payer: COMMERCIAL

## 2023-11-27 NOTE — PROGRESS NOTES
Specialty Pharmacy Refill Coordination Note     Regla is a 37 y.o. female contacted today regarding refills of [facility] specialty medication(s).    Reviewed and verified with patient:       Specialty medication(s) and dose(s) confirmed: yes    Refill Questions      Flowsheet Row Most Recent Value   Changes to allergies? No   Changes to medications? No   New conditions since last clinic visit No   Unplanned office visit, urgent care, ED, or hospital admission in the last 4 weeks  No   How does patient/caregiver feel medication is working? Very good   Financial problems or insurance changes  No   If yes, describe changes in insurance or financial issues. n/a   Since the previous refill, were any specialty medication doses or scheduled injections missed or delayed?  No   If yes, please provide the amount n/a   Why were doses missed? n/a   Does this patient require a clinical escalation to a pharmacist? No            Delivery Questions      Flowsheet Row Most Recent Value   Delivery method  at Pharmacy   Delivery address correct? Yes   Delivery phone number [phone]   Preferred delivery time? Anytime   Number of medications in delivery 1   Medication(s) being filled and delivered Rimegepant Sulfate   Doses left of specialty medications A couple   Is there any medication that is due not being filled? No   Supplies needed? No supplies needed   Cooler needed? No   Do any medications need mixed or dated? No   Copay form of payment Payment plan already set up   Additional comments n/a   Questions or concerns for the pharmacist? No   Explain any questions or concerns for the pharmacist n/a   Are any medications first time fills? No              Medication Adherence          Adherence tools used: directed education      Support network for adherence: family member                Follow-up: 28 day(s)     Amy Kaiser, Pharmacy Technician  Specialty Pharmacy Technician        
no

## 2023-12-27 ENCOUNTER — TRANSCRIBE ORDERS (OUTPATIENT)
Dept: ADMINISTRATIVE | Facility: HOSPITAL | Age: 38
End: 2023-12-27
Payer: COMMERCIAL

## 2023-12-27 DIAGNOSIS — Z12.31 VISIT FOR SCREENING MAMMOGRAM: Primary | ICD-10-CM

## 2024-01-23 ENCOUNTER — SPECIALTY PHARMACY (OUTPATIENT)
Dept: NEUROLOGY | Facility: CLINIC | Age: 39
End: 2024-01-23
Payer: COMMERCIAL

## 2024-03-07 ENCOUNTER — OFFICE VISIT (OUTPATIENT)
Dept: GASTROENTEROLOGY | Facility: CLINIC | Age: 39
End: 2024-03-07
Payer: COMMERCIAL

## 2024-03-07 VITALS
HEIGHT: 63 IN | WEIGHT: 118 LBS | SYSTOLIC BLOOD PRESSURE: 102 MMHG | OXYGEN SATURATION: 99 % | DIASTOLIC BLOOD PRESSURE: 60 MMHG | BODY MASS INDEX: 20.91 KG/M2 | HEART RATE: 85 BPM | TEMPERATURE: 97.5 F

## 2024-03-07 DIAGNOSIS — K52.9 CHRONIC DIARRHEA: Primary | ICD-10-CM

## 2024-03-07 DIAGNOSIS — R11.0 NAUSEA: ICD-10-CM

## 2024-03-07 DIAGNOSIS — K21.9 GASTROESOPHAGEAL REFLUX DISEASE, UNSPECIFIED WHETHER ESOPHAGITIS PRESENT: ICD-10-CM

## 2024-03-07 DIAGNOSIS — Z87.19 HISTORY OF COLITIS: ICD-10-CM

## 2024-03-07 DIAGNOSIS — K58.0 IRRITABLE BOWEL SYNDROME WITH DIARRHEA: ICD-10-CM

## 2024-03-07 RX ORDER — ONDANSETRON 4 MG/1
4 TABLET, ORALLY DISINTEGRATING ORAL EVERY 8 HOURS PRN
Qty: 12 TABLET | Refills: 1 | Status: SHIPPED | OUTPATIENT
Start: 2024-03-07

## 2024-03-07 NOTE — PROGRESS NOTES
Follow Up      Patient Name: Regla West  : 1985   MRN: [id number]     Chief Complaint:    Chief Complaint   Patient presents with    Diarrhea     Years intermittent more often, hx of colitis        History of Present Illness: Regla West is a 38 y.o. female, PMH includes KRISTIN, Hashimoto's thyroiditis, migraines, who is here today for follow up on diarrhea.    Pt notes chronic intermittent diarrhea, having between 2-12 loose BM per day. She notes associated generalized cramping, bloating and occasional nocturnal waking. She denies specific aggravating foods or factors. She limits her meat and dairy intake.     She notes chronic GERD, does not currently take any medications for such.     Patient denies associated fever, chills, constipation, hematemesis, dysphagia, hematochezia, melena, weight loss or gain, dysuria, jaundice or bruising.    Patient denies personal or FHx of PUD, H Pylori, gastritis, pancreatitis, Celiac disease, UC, Crohn's disease or gastric cancers. Dad (50s) with large, recurrent polyp requiring partial colectomy. Mom with colon polyps. Pt denies tobacco, illicit substance or NSAID use. Rare EtOH use.     CSY in remote past, dx with microscopic colitis.     EGD 2021 with Dr. Nguyen. Edematous aryepiglottic folds. Mildly tortuous esophagus. Small hiatal hernia. Scattered erythema in gastric body and antrum. Normal stomach and duodenum otherwise. Bx negative for EoE, H Pylori, celiac disease, intestinal metaplasia or dysplasia.     Subjective      Review of Systems:   Review of Systems   Constitutional:  Negative for appetite change, chills, diaphoresis, fatigue, fever, unexpected weight gain and unexpected weight loss.   HENT:  Negative for drooling, facial swelling, mouth sores, rhinorrhea, sore throat, tinnitus, trouble swallowing and voice change.    Eyes: Negative.    Respiratory:  Negative for cough, chest tightness and shortness of breath.    Cardiovascular:   Negative for chest pain.   Gastrointestinal:  Positive for abdominal distention, abdominal pain, diarrhea, nausea (occasional), vomiting (occasional) and GERD. Negative for blood in stool and constipation.   Genitourinary:  Negative for dysuria, flank pain, hematuria and pelvic pain.   Musculoskeletal:  Negative for arthralgias and myalgias.   Skin:  Negative for color change, pallor and rash.   Neurological:  Negative for dizziness, tremors, syncope, weakness and numbness.   Psychiatric/Behavioral:  Negative for hallucinations and sleep disturbance. The patient is not nervous/anxious.    All other systems reviewed and are negative.      Medications:     Current Outpatient Medications:     ALPRAZolam (XANAX) 0.25 MG tablet, Take 1 tablet by mouth Daily as needed for panic, Disp: 30 tablet, Rfl: 0    Calcium-Phosphorus-Vitamin D (CALCIUM/VITAMIN D3/ADULT GUMMY PO), Take  by mouth., Disp: , Rfl:     escitalopram (Lexapro) 10 MG tablet, Take 1 tablet by mouth Daily., Disp: 90 tablet, Rfl: 3    levothyroxine (SYNTHROID, LEVOTHROID) 88 MCG tablet, Take 1 tablet by mouth Daily., Disp: 90 tablet, Rfl: 3    loratadine (CLARITIN) 10 MG tablet, Take 1 tablet by mouth Daily., Disp: , Rfl:     melatonin 5 MG tablet tablet, Take 1 tablet by mouth Every Night., Disp: , Rfl:     Multiple Vitamins-Minerals (HAIR SKIN AND NAILS FORMULA PO), Take  by mouth., Disp: , Rfl:     Rimegepant Sulfate (Nurtec) 75 MG tablet dispersible tablet, Take 1 tablet by mouth As Needed. Take at onset of headache, Max of 75 mg/24 hours, Max of 18 tabs/30 days., Disp: 16 tablet, Rfl: 3    traZODone (DESYREL) 50 MG tablet, Take 1 tablet by mouth Every Night., Disp: 90 tablet, Rfl: 1    clindamycin (CLEOCIN T) 1 % external solution, Apply topically to the affected area on face as directed 2 (two) times a day. (Patient not taking: Reported on 3/7/2024), Disp: 60 mL, Rfl: 11    lidocaine (LIDODERM) 5 %, Place 1 patch on the skin as directed by provider  "Daily. Remove & Discard patch within 12 hours or as directed by MD (Patient not taking: Reported on 3/7/2024), Disp: 30 patch, Rfl: 3    triamcinolone (KENALOG) 0.1 % cream, Apply 1 application topically to the appropriate area as directed 2 (Two) Times a Day. (Patient not taking: Reported on 3/7/2024), Disp: 45 g, Rfl: 1    Allergies:   Allergies   Allergen Reactions    Macrobid [Nitrofurantoin Monohyd Macro] Rash    Omnicef [Cefdinir] Nausea And Vomiting       Social History:   Social History     Socioeconomic History    Marital status:    Tobacco Use    Smoking status: Never    Smokeless tobacco: Never   Vaping Use    Vaping status: Never Used   Substance and Sexual Activity    Alcohol use: Not Currently     Comment: socially    Drug use: No    Sexual activity: Yes     Partners: Male     Birth control/protection: Tubal ligation        Surgical History:   Past Surgical History:   Procedure Laterality Date     SECTION WITH TUBAL Bilateral 2018    Procedure:  SECTION PRIMARY WITH TUBAL * 39 WKS *;  Surgeon: Cinda Greenberg MD;  Location: [facility] LABOR DELIVERY;  Service: Obstetrics/Gynecology    D & C HYSTEROSCOPY ENDOMETRIAL ABLATION DIAGNOSTIC LAPAROSCOPY  2023    OOPHORECTOMY Right 2023    SALPINGECTOMY Bilateral 2023    SEPTOPLASTY      2017    WISDOM TOOTH EXTRACTION          Medical History:   Past Medical History:   Diagnosis Date    Anemia     Taking iron    Hashimoto's thyroiditis     Headache     Hypothyroidism     Migraine     Pain in joint, lower leg         Objective     Physical Exam:  Vital Signs:   Vitals:    24 0809   BP: 102/60   BP Location: Left arm   Patient Position: Sitting   Cuff Size: Adult   Pulse: 85   Temp: 97.5 °F (36.4 °C)   TempSrc: Temporal   SpO2: 99%   Weight: 53.5 kg (118 lb)   Height: 160 cm (62.99\")     Body mass index is 20.91 kg/m².     Physical Exam  Vitals and nursing note reviewed.   Constitutional:       Appearance: Normal " appearance. She is normal weight. She is not ill-appearing or diaphoretic.      Comments: Pleasantly conversant   HENT:      Head: Normocephalic and atraumatic.      Right Ear: External ear normal.      Left Ear: External ear normal.      Nose: Nose normal.      Mouth/Throat:      Mouth: Mucous membranes are moist.      Pharynx: Oropharynx is clear.   Eyes:      Conjunctiva/sclera: Conjunctivae normal.      Pupils: Pupils are equal, round, and reactive to light.   Neck:      Thyroid: No thyromegaly.   Cardiovascular:      Rate and Rhythm: Normal rate and regular rhythm.      Pulses: Normal pulses.      Heart sounds: Normal heart sounds.   Pulmonary:      Effort: Pulmonary effort is normal.      Breath sounds: Normal breath sounds.   Abdominal:      General: Abdomen is flat. Bowel sounds are normal. There is no distension.      Tenderness: There is no abdominal tenderness.   Musculoskeletal:         General: Normal range of motion.      Cervical back: Normal range of motion and neck supple.   Skin:     General: Skin is warm and dry.   Neurological:      General: No focal deficit present.      Mental Status: She is oriented to person, place, and time.   Psychiatric:         Mood and Affect: Mood normal.         Assessment / Plan      Assessment/Plan:   There are no diagnoses linked to this encounter.     IBS-D  H/o microscopic colitis  GERD   - rx for Xifaxan 550mg TID x 14 days sent to pharmacy   - rx for zofran ODT sent to pharmacy, per patient request, to be taken with bowel prep   - consider addition of PPI if GERD sx do not improve with SIBO tx   - pt given GERD diet instructions, advised to avoid GI irritants such as caffeine, carbonation, EtOH, tobacco, chocolate, peppermint, acid-based foods   - previous labs, endoscopy and pathology reports reviewed   - schedule for CSY   - follow up in clinic after completion of above studies   - call clinic at any time for questions or new / worsened sx    Follow Up:    Return in about 6 weeks (around 4/18/2024).    Plan of care reviewed with the patient at the conclusion of today's visit.  Education was provided regarding diagnosis, management, and any prescribed or recommended OTC medications.  Patient verbalized understanding of and agreement with management plan.     NOTE TO PATIENT: The 21st Century Cures Act makes medical notes like these available to patients in the interest of transparency. However, be advised this is a medical document. It is intended as peer to peer communication. It is written in medical language and may contain abbreviations or verbiage that are unfamiliar. It may appear blunt or direct. Medical documents are intended to carry relevant information, facts as evident, and the clinical opinion of the practitioner.     Time Statement:   Discussed plan of care in detail with patient today. Patient verbally understands and agrees. I have spent 30 minutes reviewing available diagnostics, obtaining history, examining the patient, developing a treatment plan, and educating the patient on disease process and plan of care.     Lea Gomez PA-C   [facility]

## 2024-03-13 ENCOUNTER — CLINICAL SUPPORT (OUTPATIENT)
Dept: NEUROLOGY | Facility: CLINIC | Age: 39
End: 2024-03-13
Payer: COMMERCIAL

## 2024-03-13 DIAGNOSIS — G43.119 INTRACTABLE MIGRAINE WITH AURA WITHOUT STATUS MIGRAINOSUS: Primary | ICD-10-CM

## 2024-03-13 PROCEDURE — 99214 OFFICE O/P EST MOD 30 MIN: CPT | Performed by: PSYCHIATRY & NEUROLOGY

## 2024-03-13 NOTE — PROGRESS NOTES
Subjective:    CC: Regla West is in clinic today for follow up for history of migraines.    HPI:  Follow-up: 3/13/2024: She is in clinic for for regular follow-up.  She was scheduled to have trigger point injection but daughter tells me today that after the last couple of trigger point injection, she has not noticed significant relief in headache and migraine frequency.  In fact in the last 3 months, she has noticed further increase in migraines and she is now experiencing at least 3 migraine days in a week requiring Nurtec use.  She is also reporting that her sleep is not very good.  She is currently on trazodone 25 mg at bedtime.  She denies any side effects with trazodone use.  Trazodone helps some with sleep but she cannot consistently get 6 to 7 hours of sleep at night.      The following portions of the patient's history were reviewed and updated as of 03/13/2024: allergies, social history, and problem list.       Current Outpatient Medications:     ALPRAZolam (XANAX) 0.25 MG tablet, Take 1 tablet by mouth Daily as needed for panic, Disp: 30 tablet, Rfl: 0    Calcium-Phosphorus-Vitamin D (CALCIUM/VITAMIN D3/ADULT GUMMY PO), Take  by mouth., Disp: , Rfl:     escitalopram (Lexapro) 10 MG tablet, Take 1 tablet by mouth Daily., Disp: 90 tablet, Rfl: 3    levothyroxine (SYNTHROID, LEVOTHROID) 88 MCG tablet, Take 1 tablet by mouth Daily., Disp: 90 tablet, Rfl: 3    loratadine (CLARITIN) 10 MG tablet, Take 1 tablet by mouth Daily., Disp: , Rfl:     melatonin 5 MG tablet tablet, Take 1 tablet by mouth Every Night., Disp: , Rfl:     Multiple Vitamins-Minerals (HAIR SKIN AND NAILS FORMULA PO), Take  by mouth., Disp: , Rfl:     ondansetron ODT (ZOFRAN-ODT) 4 MG disintegrating tablet, Place 1 tablet on the tongue Every 8 (Eight) Hours As Needed for Nausea or Vomiting., Disp: 12 tablet, Rfl: 1    riFAXIMin (XIFAXAN) 550 MG tablet, Take 1 tablet by mouth 3 (Three) Times a Day for 14 days., Disp: 42 tablet, Rfl: 1     Rimegepant Sulfate (Nurtec) 75 MG tablet dispersible tablet, Take 1 tablet by mouth As Needed. Take at onset of headache, Max of 75 mg/24 hours, Max of 18 tabs/30 days., Disp: 16 tablet, Rfl: 3    traZODone (DESYREL) 50 MG tablet, Take 1 tablet by mouth Every Night., Disp: 90 tablet, Rfl: 1   Past Medical History:   Diagnosis Date    Anemia     Taking iron    Hashimoto's thyroiditis     Headache     Hypothyroidism     Migraine     Pain in joint, lower leg       Past Surgical History:   Procedure Laterality Date     SECTION WITH TUBAL Bilateral 2018    Procedure:  SECTION PRIMARY WITH TUBAL * 39 WKS *;  Surgeon: Cinda Greenberg MD;  Location: [facility] LABOR DELIVERY;  Service: Obstetrics/Gynecology    COLONOSCOPY      D & C HYSTEROSCOPY ENDOMETRIAL ABLATION DIAGNOSTIC LAPAROSCOPY  2023    OOPHORECTOMY Right 2023    SALPINGECTOMY Bilateral 2023    SEPTOPLASTY      2017    TUBAL ABDOMINAL LIGATION      UPPER GASTROINTESTINAL ENDOSCOPY      WISDOM TOOTH EXTRACTION        Family History   Problem Relation Age of Onset    Hyperlipidemia Mother     Hypertension Father     Hyperlipidemia Father     Thyroid disease Father     Breast cancer Neg Hx     Ovarian cancer Neg Hx     Uterine cancer Neg Hx     Colon cancer Neg Hx     Colon polyps Neg Hx     Esophageal cancer Neg Hx         Review of Systems  Objective:    There were no vitals taken for this visit.    Neurology Exam:  General apperance: NAD.     Mental status: Alert, awake and oriented to time place and person.    Language and Speech: No aphasia or dysarthria.    CN II to XII: Intact.    Opthalmoscopic Exam: No papilledema.    Motor:  Right UE muscle strength 5/5. Normal tone.     Left UE muscle strength 5/5. Normal tone.      Right LE muscle strength 5/5. Normal tone.     Left LE muscle strength 5/5. Normal tone.      Sensory: Normal light touch, vibration and pinprick sensation bilaterally.    DTRs: 2+ bilaterally.    Babinski: Negative  bilaterally.    Co-ordination: Normal finger-to-nose, heel to shin B/L.    Rhomberg: Negative.    Gait: Normal.    Cardiovascular: Regular rate and rhythm without murmur, gallop or rub.    Assessment and Plan:  1. Intractable migraine with aura without status migrainosus  In the past, trigger point injection was helping in keeping both tension headaches and migraines under good control but after the last 2 TPI's, she has noticed not much improvement and in fact reports that the migraine seems to be getting worse in frequency and intensity.  She is currently reporting 10-12 migraine days in a month.  I will be starting her on Ajovy once a month injection and will hold off on TPI for now.  I am hoping that with Ajovy once a month injection, migraine frequency and intensity will improve.  Nurtec is helping as an abortive treatments it will be continued and I will see her back in clinic in 3 months for follow-up.    I spent 30 minutes in patient care: Reviewing records prior to the visit, entering orders and documentation and spent more than easley 50% of this time face-to-face in management, instructions and education regarding above mentioned diagnosis and also on counseling and discussing about taking medication regularly, possible side effects with medication use, importance of good sleep hygiene, good hydration and regular exercise.    Return in about 3 months (around 6/13/2024).       Note to patient: The 21st Century Cures Act makes medical notes like these available to patients in the interest of transparency. However, be advised this is a medical document. It is intended as peer to peer communication. It is written in medical language and may contain abbreviations or verbiage that are unfamiliar. It may appear blunt or direct. Medical documents are intended to carry relevant information, facts as evident, and the clinical opinion of the physician.

## 2024-03-15 ENCOUNTER — SPECIALTY PHARMACY (OUTPATIENT)
Dept: LAB | Facility: HOSPITAL | Age: 39
End: 2024-03-15
Payer: COMMERCIAL

## 2024-03-15 ENCOUNTER — SPECIALTY PHARMACY (OUTPATIENT)
Dept: NEUROLOGY | Facility: CLINIC | Age: 39
End: 2024-03-15
Payer: COMMERCIAL

## 2024-03-15 RX ORDER — FREMANEZUMAB-VFRM 225 MG/1.5ML
225 INJECTION SUBCUTANEOUS
Qty: 1.5 ML | Refills: 11 | Status: SHIPPED | OUTPATIENT
Start: 2024-03-15

## 2024-03-15 RX ORDER — RIMEGEPANT SULFATE 75 MG/75MG
75 TABLET, ORALLY DISINTEGRATING ORAL AS NEEDED
Qty: 16 TABLET | Refills: 11 | Status: SHIPPED | OUTPATIENT
Start: 2024-03-15

## 2024-03-15 RX ORDER — RIMEGEPANT SULFATE 75 MG/75MG
75 TABLET, ORALLY DISINTEGRATING ORAL AS NEEDED
Qty: 16 TABLET | Refills: 11 | Status: SHIPPED | OUTPATIENT
Start: 2024-03-15 | End: 2024-03-15 | Stop reason: SDUPTHER

## 2024-03-15 RX ORDER — FREMANEZUMAB-VFRM 225 MG/1.5ML
225 INJECTION SUBCUTANEOUS
Qty: 1.5 ML | Refills: 11 | Status: SHIPPED | OUTPATIENT
Start: 2024-03-15 | End: 2024-03-15 | Stop reason: SDUPTHER

## 2024-03-15 NOTE — PROGRESS NOTES
Specialty Pharmacy Patient Management Program  Neurology Initial Assessment     Regla West is a 38 y.o. female with migraines seen by a Neurology provider and enrolled in the Neurology Patient Management program offered by [facility].  An initial outreach was conducted, including assessment of therapy appropriateness and specialty medication education for Ajovy and Nurtec PRN. The patient was introduced to services offered by [facility], including: regular assessments, refill coordination, curbside pick-up or mail order delivery options, prior authorization maintenance, and financial assistance programs as applicable. The patient was also provided with contact information for the pharmacy team.     Insurance Coverage & Financial Support  [employer]  Ajovy Co-Pay Card  Nurtec Co-Pay Card    Relevant Past Medical History and Comorbidities  Relevant medical history and concomitant health conditions were discussed with the patient. The patient's chart has been reviewed for relevant past medical history and comorbid health conditions and updated as necessary.   Past Medical History:   Diagnosis Date    Anemia     Taking iron    Hashimoto's thyroiditis     Headache     Hypothyroidism     Migraine     Pain in joint, lower leg      Social History     Socioeconomic History    Marital status:    Tobacco Use    Smoking status: Never    Smokeless tobacco: Never   Vaping Use    Vaping status: Never Used   Substance and Sexual Activity    Alcohol use: Not Currently     Comment: socially    Drug use: No    Sexual activity: Yes     Partners: Male     Birth control/protection: Tubal ligation     Problem list reviewed by Yojana Guillermo, PharmD on 3/15/2024 at  9:02 AM    Allergies  Known allergies and reactions were discussed with the patient. The patient's chart has been reviewed for  allergy information and updated as necessary.   Allergies   Allergen Reactions     Macrobid [Nitrofurantoin Monohyd Macro] Rash    Omnicef [Cefdinir] Nausea And Vomiting     Allergies reviewed by Yojana Guillermo, PharmD on 3/15/2024 at  9:02 AM    Relevant Laboratory Values  Common labs          6/26/2023    11:49 9/15/2023    17:22   Common Labs   Glucose 86  173    BUN 11  14    Creatinine 0.79  1.01    Sodium 140  139    Potassium 3.7  4.2    Chloride 103  100    Calcium 9.7  10.0    Albumin 4.3  4.9    Total Bilirubin 0.2  0.4    Alkaline Phosphatase 58  72    AST (SGOT) 20  24    ALT (SGPT) 14  16    WBC 5.82  8.99    Hemoglobin 14.1  16.6    Hematocrit 42.7  51.5    Platelets 205  234    Hemoglobin A1C 4.90         Lab Assessment  The above labs have been reviewed. No dose adjustments are needed for the specialty medication(s) based on the labs.     Current Medication List  This medication list has been reviewed with the patient and evaluated for any interactions or necessary modifications/recommendations, and updated to include all prescription medications, OTC medications, and supplements the patient is currently taking.  This list reflects what is contained in the patient's profile, which has also been marked as reviewed to communicate to other providers it is the most up to date version of the patient's current medication therapy.     Current Outpatient Medications:     Rimegepant Sulfate (Nurtec) 75 MG tablet dispersible tablet, Take 1 tablet by mouth As Needed. Take at onset of headache, Max of 75 mg/24 hours, Max of 18 tabs/30 days., Disp: 16 tablet, Rfl: 11    ALPRAZolam (XANAX) 0.25 MG tablet, Take 1 tablet by mouth Daily as needed for panic, Disp: 30 tablet, Rfl: 0    Calcium-Phosphorus-Vitamin D (CALCIUM/VITAMIN D3/ADULT GUMMY PO), Take  by mouth., Disp: , Rfl:     escitalopram (Lexapro) 10 MG tablet, Take 1 tablet by mouth Daily., Disp: 90 tablet, Rfl: 3    Fremanezumab-vfrm (Ajovy) 225 MG/1.5ML solution auto-injector, Inject 225 mg under the skin into the appropriate area as  directed Every 30 (Thirty) Days., Disp: 1.5 mL, Rfl: 11    levothyroxine (SYNTHROID, LEVOTHROID) 88 MCG tablet, Take 1 tablet by mouth Daily., Disp: 90 tablet, Rfl: 3    loratadine (CLARITIN) 10 MG tablet, Take 1 tablet by mouth Daily., Disp: , Rfl:     melatonin 5 MG tablet tablet, Take 1 tablet by mouth Every Night., Disp: , Rfl:     Multiple Vitamins-Minerals (HAIR SKIN AND NAILS FORMULA PO), Take  by mouth., Disp: , Rfl:     ondansetron ODT (ZOFRAN-ODT) 4 MG disintegrating tablet, Place 1 tablet on the tongue Every 8 (Eight) Hours As Needed for Nausea or Vomiting., Disp: 12 tablet, Rfl: 1    riFAXIMin (XIFAXAN) 550 MG tablet, Take 1 tablet by mouth 3 (Three) Times a Day for 14 days., Disp: 42 tablet, Rfl: 1    traZODone (DESYREL) 50 MG tablet, Take 1 tablet by mouth Every Night., Disp: 90 tablet, Rfl: 1    Medicines reviewed by Yojana Guillermo, PharmD on 3/15/2024 at  9:02 AM    Drug Interactions  none     Initial Education Provided for Specialty Medication  The patient has been provided with the following education and any applicable administration techniques (i.e. self-injection) have been demonstrated for the therapies indicated. All questions and concerns have been addressed prior to the patient receiving the medication, and the patient has verbalized understanding of the education and any materials provided.  Additional patient education shall be provided and documented upon request by the patient, provider or payer.             Ajovy (fremanezumab-[facility]) 225 mg subQ every 28 days        Medication Expectations   Why am I taking this medication? You are taking this medication for migraine prophylaxis.   What should I expect while on this medication? You should expect to a decrease in the frequency and severity of your migraines.   How does the medication work? Ajovy is a monoclonal antibody that binds to calcitonin gene-related peptide (CGRP) and blocks its binding to the receptor decreasing the severity  of migraines.   How long will I be on this medication for? The amount of time you will be on this medication will be determined by your doctor and your response to the medication.    How do I take this medication? Take as directed on your prescription label. This medication is a self-injection given every 28 days.    What are some possible side effects? Injection site reactions and hypersensitivity reactions.   What happens if I miss a dose? If you miss a dose, take it as soon as you remember, and time next dose 28 days from last dose.                  Medication Safety   What are things I should warn my doctor immediately about? Cases of anaphylaxis and angioedema have been reported in the postmarketing setting. If a reaction occurs, notify your doctor immediately.   What are things that I should be cautious of? Injection site reaction and hypersensitivity reactions, including rash, pruritus, drug hypersensitivity, and urticaria   What are some medications that can interact with this one? No drug interactions identified.            Medication Storage/Handling   How should I handle this medication? Keep this medication our of reach of pets/children in original container.  On the day your Ajovy is due let it set at room temperature for 30 minutes prior to injection. (do NOT warm using a heat source such as hot water or a microwave).  Administer in the abdomen, thigh, back of the upper arm, or buttocks.  Do not inject where the skin is tender, bruised, red or hard.  Rotate injection sites.   How does this medication need to be stored? Store in refrigerator and keep dry.   How should I dispose of this medication? You can dispose of the empty syringe in a sharps container, and if this is not available you may use an empty hard plastic container such as a milk jug or tide container.            Resources/Support   How can I remind myself to take this medication? You can download a reminder dolly on your phone or use a  pao  to help with your monthly injection.   Is financial support available?  Yes, [employer] can provide co-pay cards if you have commercial insurance or patient assistance if you have Medicare or no insurance.    Which vaccines are recommended for me? Talk to your doctor about these vaccines: Flu, Coronavirus (COVID-19), Pneumococcal (pneumonia), Tdap, Hepatitis B, Zoster (shingles)               Nurtec (rimegepant)  Medication Expectations   Why am I taking this medication? You are taking this medication for migraine prophylaxis or to treat an acute migraine.   What should I expect while on this medication? You should expect to see a decrease in the frequency and severity of your migraines.   How does the medication work? Nurtec is a monoclonal antibody that binds to calcitonin gene-related peptide (CGRP) and blocks its binding to the receptor decreasing the severity of migraines.   How long will I be on this medication for? The amount of time you will be on this medication will be determined by your doctor and your response to the medication.    How do I take this medication? Take as directed on your prescription label.   What are some possible side effects? Potential side effects including, but not limited to nausea. Pt verbalized understanding.   What happens if I miss a dose? Take the missed dose as soon as possible, and resume the every other day timed from the last dose..     Medication Safety   What are things I should warn my doctor immediately about? Hypersensitivity reactions - trouble breathing or swallowing.   What are things that I should be cautious of? Hypersensitivity reactions (eg, dyspnea, rash), including delayed serious reactions, have occurred; discontinue use if suspected    What are some medications that can interact with this one? Avoid concomitant administration of Nurtec ODT with strong inhibitors of CY, strong or moderate inducers of CYP3A or inhibitors of P-gp or  BCRP. Avoid another dose of Nurtec ODT within 48 hours when it is administered with moderate inhibitors of CY.  Ask your pharmacist or health care provider before starting new medications     Medication Storage/Handling   How should I handle this medication? Keep this medication out of reach of pets/children in original container. Ensure hands are dry before opening blister pack.   How does this medication need to be stored? Store at room temperature away from heat/cold, sunlight or moisture   How should I dispose of this medication? There should not be a need to dispose of this medication unless your provider decides to change the dose or therapy. If that is the case, take to your local police station for proper disposal. Some pharmacies also have take-back bins for medication drop-off.      Resources/Support   How can I remind myself to take this medication? You can download reminder apps to help you manage your refills. You may also set an alarm on your phone to remind you. The pharmacy carries pill boxes that you can place next to an area you pass everyday (such as where you place your car keys or where you charge your phone)   Is financial support available?  Yes, [employer] can provide co-pay cards if you have commercial insurance or patient assistance if you have Medicare or no insurance.    Which vaccines are recommended for me? Talk to your doctor about these vaccines: Flu, Coronavirus (COVID-19), Pneumococcal (pneumonia), Tdap, Hepatitis B, Zoster (shingles)          Adherence and Self-Administration  Adherence related to the patient's specialty therapy was discussed with the patient. The Adherence segment of this outreach has been reviewed and updated.   Is there a concern with patient's ability to self administer the medication correctly and without issue?: No  Were any potential barriers to adherence identified during the initial assessment or patient education?: No  Are there any  concerns regarding the patient's understanding of the importance of medication adherence?: No  Methods for Supporting Patient Adherence and/or Self-Administration: none at this time    Goals of Therapy  Goals related to the patient's specialty therapy were discussed with the patient. The Patient Goals segment of this outreach has been reviewed and updated.   Goals Addressed Today        Specialty Pharmacy General Goal      Decrease frequency, severity and duration of migraine attacks from baseline (10-12 per month)               Reassessment Plan & Follow-Up  Medication Therapy Changes: Ajovy 225 mg subcutaneously monthly and Nurtec 75 mg PO once daily as needed for migraines. - Take 1 tablet at the onset of headache, Max of 75 mg/24 hours  Related Plans, Therapy Recommendations, or Therapy Problems to Be Addressed: none  Pharmacist to perform regular reassessments no more than (6) months from the previous assessment.  Care Coordinator to set up future refill outreaches, coordinate prescription delivery, and escalate clinical questions to pharmacist.   Welcome information and patient satisfaction survey to be sent by specialty pharmacy team with patient's initial fill.    Attestation  Therapeutic appropriateness: Appropriate   I attest the patient was actively involved in and has agreed to the above plan of care. If the prescribed therapy is at any point deemed not appropriate based on the current or future assessments, a consultation will be initiated with the patient's specialty care provider to determine the best course of action. The revised plan of therapy will be documented along with any additional patient education provided. Discussed aforementioned material with patient via telemedicine.    Yojana Guillermo PharmD, [facility] Specialty Pharmacist, Neurology  3/15/2024  09:07 EDT

## 2024-04-09 ENCOUNTER — SPECIALTY PHARMACY (OUTPATIENT)
Dept: NEUROLOGY | Facility: CLINIC | Age: 39
End: 2024-04-09
Payer: COMMERCIAL

## 2024-04-09 NOTE — PROGRESS NOTES
Specialty Pharmacy Refill Coordination Note     Regla is a 38 y.o. female contacted today regarding refills of Ajovy and Nurtec specialty medication(s). Patient due for next injection on 4/15/24.     Reviewed and verified with patient:       Specialty medication(s) and dose(s) confirmed: yes    Refill Questions      Flowsheet Row Most Recent Value   Changes to allergies? No   Changes to medications? No   New conditions or infections since last clinic visit No   Unplanned office visit, urgent care, ED, or hospital admission in the last 4 weeks  No   How does patient/caregiver feel medication is working? Very good   Financial problems or insurance changes  No   If yes, describe changes in insurance or financial issues. n/a   Since the previous refill, were any specialty medication doses or scheduled injections missed or delayed?  No   If yes, please provide the amount n/a   Why were doses missed? n/a   Does this patient require a clinical escalation to a pharmacist? No            Delivery Questions      Flowsheet Row Most Recent Value   Delivery method  at Pharmacy   Delivery address verified with patient/caregiver? Yes   Delivery address Other (Comment)  [P/U]   Number of medications in delivery 2   Medication(s) being filled and delivered Rimegepant Sulfate, Fremanezumab-Vfrm   Doses left of specialty medications Ajovy- 0 , Nurtec- A couple   Copay verified? Yes   Copay amount 15.00   Copay form of payment Pay at pickup              Medication Adherence    Adherence tools used: directed education  Support network for adherence: family member          Follow-up: 28 day(s)     Amy Kaiser, Pharmacy Technician  Specialty Pharmacy Technician

## 2024-04-11 RX ORDER — SODIUM, POTASSIUM,MAG SULFATES 17.5-3.13G
354 SOLUTION, RECONSTITUTED, ORAL ORAL TAKE AS DIRECTED
Qty: 354 ML | Refills: 0 | Status: SHIPPED | OUTPATIENT
Start: 2024-04-11

## 2024-04-18 ENCOUNTER — OUTSIDE FACILITY SERVICE (OUTPATIENT)
Dept: GASTROENTEROLOGY | Facility: CLINIC | Age: 39
End: 2024-04-18
Payer: COMMERCIAL

## 2024-04-18 PROCEDURE — 88305 TISSUE EXAM BY PATHOLOGIST: CPT | Performed by: INTERNAL MEDICINE

## 2024-04-18 PROCEDURE — 45380 COLONOSCOPY AND BIOPSY: CPT | Performed by: INTERNAL MEDICINE

## 2024-04-18 PROCEDURE — 45385 COLONOSCOPY W/LESION REMOVAL: CPT | Performed by: INTERNAL MEDICINE

## 2024-04-19 ENCOUNTER — LAB REQUISITION (OUTPATIENT)
Dept: LAB | Facility: HOSPITAL | Age: 39
End: 2024-04-19
Payer: COMMERCIAL

## 2024-04-19 DIAGNOSIS — Z12.11 ENCOUNTER FOR SCREENING FOR MALIGNANT NEOPLASM OF COLON: ICD-10-CM

## 2024-04-19 DIAGNOSIS — Z87.19 PERSONAL HISTORY OF OTHER DISEASES OF THE DIGESTIVE SYSTEM: ICD-10-CM

## 2024-04-19 DIAGNOSIS — Q43.8 OTHER SPECIFIED CONGENITAL MALFORMATIONS OF INTESTINE: ICD-10-CM

## 2024-04-19 DIAGNOSIS — K64.8 OTHER HEMORRHOIDS: ICD-10-CM

## 2024-04-19 DIAGNOSIS — D12.2 BENIGN NEOPLASM OF ASCENDING COLON: ICD-10-CM

## 2024-04-24 ENCOUNTER — HOSPITAL ENCOUNTER (OUTPATIENT)
Dept: MAMMOGRAPHY | Facility: HOSPITAL | Age: 39
Discharge: HOME | End: 2024-04-24
Admitting: OBSTETRICS & GYNECOLOGY
Payer: COMMERCIAL

## 2024-04-24 DIAGNOSIS — Z12.31 VISIT FOR SCREENING MAMMOGRAM: ICD-10-CM

## 2024-04-24 PROCEDURE — 77063 BREAST TOMOSYNTHESIS BI: CPT

## 2024-04-24 PROCEDURE — 77067 SCR MAMMO BI INCL CAD: CPT

## 2024-04-25 DIAGNOSIS — K52.838 OTHER MICROSCOPIC COLITIS: Primary | ICD-10-CM

## 2024-04-25 LAB — REF LAB TEST METHOD: NORMAL

## 2024-04-25 RX ORDER — BUDESONIDE 3 MG/1
9 CAPSULE, COATED PELLETS ORAL DAILY
Qty: 180 CAPSULE | Refills: 1 | Status: SHIPPED | OUTPATIENT
Start: 2024-04-25 | End: 2024-08-23

## 2024-05-10 ENCOUNTER — SPECIALTY PHARMACY (OUTPATIENT)
Dept: GENERAL RADIOLOGY | Facility: HOSPITAL | Age: 39
End: 2024-05-10
Payer: COMMERCIAL

## 2024-05-13 RX ORDER — CLINDAMYCIN PHOSPHATE 11.9 MG/ML
SOLUTION TOPICAL 2 TIMES DAILY
Qty: 60 ML | Refills: 6 | Status: SHIPPED | OUTPATIENT
Start: 2024-05-13

## 2024-06-06 ENCOUNTER — SPECIALTY PHARMACY (OUTPATIENT)
Dept: GENERAL RADIOLOGY | Facility: HOSPITAL | Age: 39
End: 2024-06-06
Payer: COMMERCIAL

## 2024-06-06 RX ORDER — FREMANEZUMAB-VFRM 225 MG/1.5ML
225 INJECTION SUBCUTANEOUS
Qty: 1.5 ML | Refills: 11 | Status: SHIPPED | OUTPATIENT
Start: 2024-06-06

## 2024-06-06 RX ORDER — RIMEGEPANT SULFATE 75 MG/75MG
75 TABLET, ORALLY DISINTEGRATING ORAL AS NEEDED
Qty: 16 TABLET | Refills: 11 | Status: SHIPPED | OUTPATIENT
Start: 2024-06-06

## 2024-06-06 NOTE — PROGRESS NOTES
Specialty Pharmacy Refill Coordination Note     Regla is a 38 y.o. female contacted today regarding refills of Ajovy due every 28 days (next 6/12) and Nurtec PRN specialty medication(s).    Reviewed and verified with patient:       Specialty medication(s) and dose(s) confirmed: yes    Refill Questions      Flowsheet Row Most Recent Value   Changes to allergies? No   Changes to medications? No   New conditions or infections since last clinic visit No   Unplanned office visit, urgent care, ED, or hospital admission in the last 4 weeks  No   How does patient/caregiver feel medication is working? Very good   Financial problems or insurance changes  No   If yes, describe changes in insurance or financial issues. n/a   Since the previous refill, were any specialty medication doses or scheduled injections missed or delayed?  No   If yes, please provide the amount n/a   Why were doses missed? n/a   Does this patient require a clinical escalation to a pharmacist? No            Delivery Questions      Flowsheet Row Most Recent Value   Delivery method [employer]   Delivery address verified with patient/caregiver? Yes   Delivery address Home   Number of medications in delivery 2   Medication(s) being filled and delivered Fremanezumab-Vfrm, Rimegepant Sulfate   Doses left of specialty medications Ajovy due every 28 days--next 6/12, and a few nurtec   Copay verified? Yes   Copay amount $15 Ajovy and $0 Nurtec (insurance/copay cards)   Copay form of payment Credit/debit on file              Medication Adherence    Adherence tools used: directed education  Support network for adherence: family member          Follow-up: 1 month(s)     Yojana Guillermo, KatieD

## 2024-07-08 ENCOUNTER — SPECIALTY PHARMACY (OUTPATIENT)
Dept: GENERAL RADIOLOGY | Facility: HOSPITAL | Age: 39
End: 2024-07-08
Payer: COMMERCIAL

## 2024-07-08 NOTE — PROGRESS NOTES
Specialty Pharmacy Refill Coordination Note     Regla is a 38 y.o. female contacted today regarding refills of Ajovy due every 28 days--next 7/10 and Nurtec PRN.    Reviewed and verified with patient:       Specialty medication(s) and dose(s) confirmed: yes    Refill Questions      Flowsheet Row Most Recent Value   Changes to allergies? No   Changes to medications? No   New conditions or infections since last clinic visit No   Unplanned office visit, urgent care, ED, or hospital admission in the last 4 weeks  No   How does patient/caregiver feel medication is working? Very good   Financial problems or insurance changes  No   If yes, describe changes in insurance or financial issues. n/a   Since the previous refill, were any specialty medication doses or scheduled injections missed or delayed?  No   If yes, please provide the amount n/a   Why were doses missed? n/a   Does this patient require a clinical escalation to a pharmacist? No            Delivery Questions      Flowsheet Row Most Recent Value   Delivery method [employer]   Delivery address verified with patient/caregiver? Yes   Delivery address Home   Number of medications in delivery 2   Medication(s) being filled and delivered Fremanezumab-Vfrm, Rimegepant Sulfate   Doses left of specialty medications a few nurtec, Ajovy due every 28 days--next 7/10   Copay verified? Yes   Copay amount $15 Ajovy, $0 Nurtec   Copay form of payment Credit/debit on file   Ship Date 7/9/24   Delivery Date 7/10/24   Signature Required No              Medication Adherence    Adherence tools used: directed education  Support network for adherence: family member          Follow-up: 1 month(s)     Yojana Guillermo, KatieD

## 2024-07-11 ENCOUNTER — OFFICE VISIT (OUTPATIENT)
Dept: NEUROLOGY | Facility: CLINIC | Age: 39
End: 2024-07-11
Payer: COMMERCIAL

## 2024-07-11 VITALS
SYSTOLIC BLOOD PRESSURE: 88 MMHG | BODY MASS INDEX: 20.91 KG/M2 | OXYGEN SATURATION: 100 % | HEIGHT: 63 IN | DIASTOLIC BLOOD PRESSURE: 62 MMHG | HEART RATE: 73 BPM

## 2024-07-11 DIAGNOSIS — G43.119 INTRACTABLE MIGRAINE WITH AURA WITHOUT STATUS MIGRAINOSUS: Primary | ICD-10-CM

## 2024-07-11 DIAGNOSIS — F41.1 GENERALIZED ANXIETY DISORDER: ICD-10-CM

## 2024-07-11 PROCEDURE — 99214 OFFICE O/P EST MOD 30 MIN: CPT | Performed by: PSYCHIATRY & NEUROLOGY

## 2024-07-11 RX ORDER — TRAZODONE HYDROCHLORIDE 50 MG/1
50 TABLET ORAL NIGHTLY
Qty: 90 TABLET | Refills: 1 | Status: SHIPPED | OUTPATIENT
Start: 2024-07-11

## 2024-07-11 RX ORDER — ESCITALOPRAM OXALATE 10 MG/1
10 TABLET ORAL DAILY
Qty: 90 TABLET | Refills: 3 | Status: SHIPPED | OUTPATIENT
Start: 2024-07-11

## 2024-07-11 NOTE — PROGRESS NOTES
Subjective:    CC: Regla West is in clinic today for follow up for migraines and tension headaches.    HPI:  Follow-up: 3/13/2024: She is in clinic for for regular follow-up.  She was scheduled to have trigger point injection but daughter tells me today that after the last couple of trigger point injection, she has not noticed significant relief in headache and migraine frequency.  In fact in the last 3 months, she has noticed further increase in migraines and she is now experiencing at least 3 migraine days in a week requiring Nurtec use.  She is also reporting that her sleep is not very good.  She is currently on trazodone 25 mg at bedtime.  She denies any side effects with trazodone use.  Trazodone helps some with sleep but she cannot consistently get 6 to 7 hours of sleep at night.    Follow-up: 7/11/2024: He is in clinic for regular follow-up.  Since her last visit in March 2024, she reports that she has now done 3 Ajovy injections and has had excellent response with her migraines reducing from 12 migraine days to 3-4 migraine days.  She is using Nurtec as needed as an abortive treatment and it is working well.  She reports that usually 1 week prior to her next injection, migraines are worse.  She does report injection site reaction with Ajovy.    The following portions of the patient's history were reviewed and updated as of 07/11/2024: allergies, social history, and problem list.       Current Outpatient Medications:     ALPRAZolam (XANAX) 0.25 MG tablet, Take 1 tablet by mouth Daily as needed for panic (Patient taking differently: Take 1 tablet by mouth Daily As Needed (prn flying).), Disp: 30 tablet, Rfl: 0    Budesonide (ENTOCORT EC) 3 MG 24 hr capsule, Take 3 capsules by mouth Daily for 120 days. (Patient taking differently: Take 3 capsules by mouth Daily As Needed.), Disp: 180 capsule, Rfl: 1    Calcium-Phosphorus-Vitamin D (CALCIUM/VITAMIN D3/ADULT GUMMY PO), Take  by mouth., Disp: , Rfl:      escitalopram (Lexapro) 10 MG tablet, Take 1 tablet by mouth Daily., Disp: 90 tablet, Rfl: 3    Fremanezumab-vfrm (Ajovy) 225 MG/1.5ML solution auto-injector, Inject 225 mg under the skin into the appropriate area as directed Every 30 (Thirty) Days., Disp: 1.5 mL, Rfl: 11    levothyroxine (SYNTHROID, LEVOTHROID) 88 MCG tablet, Take 1 tablet by mouth Daily., Disp: 90 tablet, Rfl: 3    loratadine (CLARITIN) 10 MG tablet, Take 1 tablet by mouth Daily., Disp: , Rfl:     melatonin 5 MG tablet tablet, Take 1 tablet by mouth Every Night., Disp: , Rfl:     Multiple Vitamins-Minerals (HAIR SKIN AND NAILS FORMULA PO), Take  by mouth., Disp: , Rfl:     Rimegepant Sulfate (Nurtec) 75 MG tablet dispersible tablet, Take 1 tablet by mouth As Needed. Take at onset of headache, Max of 75 mg/24 hours, Max of 18 tabs/30 days., Disp: 16 tablet, Rfl: 11    traZODone (DESYREL) 50 MG tablet, Take 1 tablet by mouth Every Night., Disp: 90 tablet, Rfl: 1    clindamycin (CLEOCIN T) 1 % external solution, Apply  topically to the appropriate area as directed 2 (Two) Times a Day., Disp: 60 mL, Rfl: 6    tretinoin (RETIN-A) 0.025 % cream, Apply 1 Application topically to the appropriate area as directed Every Night., Disp: 45 g, Rfl: 6   Past Medical History:   Diagnosis Date    Anemia     Taking iron    Hashimoto's thyroiditis     Headache     Hypothyroidism     Migraine     Pain in joint, lower leg       Past Surgical History:   Procedure Laterality Date     SECTION WITH TUBAL Bilateral 2018    Procedure:  SECTION PRIMARY WITH TUBAL * 39 WKS *;  Surgeon: Cinda Greenberg MD;  Location: [facility] LABOR DELIVERY;  Service: Obstetrics/Gynecology    COLONOSCOPY      D & C HYSTEROSCOPY ENDOMETRIAL ABLATION DIAGNOSTIC LAPAROSCOPY  2023    OOPHORECTOMY Right 2023    SALPINGECTOMY Bilateral 2023    SEPTOPLASTY      2017    TUBAL ABDOMINAL LIGATION      UPPER GASTROINTESTINAL ENDOSCOPY      WISDOM TOOTH EXTRACTION        Family  "History   Problem Relation Age of Onset    Hyperlipidemia Mother     Hypertension Father     Hyperlipidemia Father     Thyroid disease Father     Breast cancer Neg Hx     Ovarian cancer Neg Hx     Uterine cancer Neg Hx     Colon cancer Neg Hx     Colon polyps Neg Hx     Esophageal cancer Neg Hx         Review of Systems  Objective:    BP (!) 88/62   Pulse 73   Ht 160 cm (62.99\")   SpO2 100%   BMI 20.91 kg/m²     Neurology Exam:  General apperance: NAD.     Mental status: Alert, awake and oriented to time place and person.    Language and Speech: No aphasia or dysarthria.    CN II to XII: Intact.    Opthalmoscopic Exam: No papilledema.    Motor:  Right UE muscle strength 5/5. Normal tone.     Left UE muscle strength 5/5. Normal tone.      Right LE muscle strength 5/5. Normal tone.     Left LE muscle strength 5/5. Normal tone.      Sensory: Normal light touch, vibration and pinprick sensation bilaterally.    DTRs: 2+ bilaterally.    Babinski: Negative bilaterally.    Co-ordination: Normal finger-to-nose, heel to shin B/L.    Rhomberg: Negative.    Gait: Normal.    Cardiovascular: Regular rate and rhythm without murmur, gallop or rub.    Assessment and Plan:  1. Intractable migraine with aura without status migrainosus  She has had excellent response with once a month Ajovy injection and migraine frequency is reduced to 3-4 breakthrough migraines in a month from 12 migraine days.  She is reporting most breakthrough migraines 1 week prior to the due date of her next Ajovy injection.  I advised her to start doing Ajovy every 25th day and it will help reduce migraine frequency on further.  Continue with Nurtec as needed as an abortive treatment and I will see her back in clinic in 6 months for follow-up.       I spent 30 minutes in patient care: Reviewing records prior to the visit, entering orders and documentation and spent more than easley 50% of this time face-to-face in management, instructions and education " regarding above mentioned diagnosis and also on counseling and discussing about taking medication regularly, possible side effects with medication use, importance of good sleep hygiene, good hydration and regular exercise.    Return in about 6 months (around 1/11/2025).       Note to patient: The 21st Century Cures Act makes medical notes like these available to patients in the interest of transparency. However, be advised this is a medical document. It is intended as peer to peer communication. It is written in medical language and may contain abbreviations or verbiage that are unfamiliar. It may appear blunt or direct. Medical documents are intended to carry relevant information, facts as evident, and the clinical opinion of the physician.

## 2024-07-25 ENCOUNTER — OFFICE VISIT (OUTPATIENT)
Dept: GASTROENTEROLOGY | Facility: CLINIC | Age: 39
End: 2024-07-25
Payer: COMMERCIAL

## 2024-07-25 VITALS
BODY MASS INDEX: 20.84 KG/M2 | DIASTOLIC BLOOD PRESSURE: 60 MMHG | TEMPERATURE: 97.5 F | WEIGHT: 117.6 LBS | HEART RATE: 76 BPM | OXYGEN SATURATION: 100 % | SYSTOLIC BLOOD PRESSURE: 90 MMHG | HEIGHT: 63 IN

## 2024-07-25 DIAGNOSIS — K52.839 MICROSCOPIC COLITIS, UNSPECIFIED MICROSCOPIC COLITIS TYPE: Primary | ICD-10-CM

## 2024-07-25 PROCEDURE — 99214 OFFICE O/P EST MOD 30 MIN: CPT | Performed by: INTERNAL MEDICINE

## 2024-07-25 RX ORDER — BUDESONIDE 3 MG/1
CAPSULE, COATED PELLETS ORAL
Qty: 154 CAPSULE | Refills: 0 | Status: SHIPPED | OUTPATIENT
Start: 2024-07-25 | End: 2024-10-23

## 2024-07-25 NOTE — PROGRESS NOTES
PCP: Kvng Cole PA    Chief Complaint   Patient presents with    Follow-up       History of Present Illness:   Regla West is a 38 y.o. female who presents to GI clinic as a follow up for diarrhea. Biopsies of colon with acute colitis, microscopic. No chronic features. Does have a h/o microscopic colitis so responded with budesonide.  However, she started ajovy and has noticed more constipation. Currently with hypogastric discomfort and nocturnal diarrhea 2-3 x.    Past Medical History:   Diagnosis Date    Anemia     Taking iron    Hashimoto's thyroiditis     Headache     Hypothyroidism     Migraine     Pain in joint, lower leg        Past Surgical History:   Procedure Laterality Date     SECTION WITH TUBAL Bilateral 2018    Procedure:  SECTION PRIMARY WITH TUBAL * 39 WKS *;  Surgeon: Cinda Greenberg MD;  Location: [facility] LABOR DELIVERY;  Service: Obstetrics/Gynecology    COLONOSCOPY      D & C HYSTEROSCOPY ENDOMETRIAL ABLATION DIAGNOSTIC LAPAROSCOPY  2023    OOPHORECTOMY Right 2023    SALPINGECTOMY Bilateral 2023    SEPTOPLASTY      2017    TUBAL ABDOMINAL LIGATION      UPPER GASTROINTESTINAL ENDOSCOPY      WISDOM TOOTH EXTRACTION           Current Outpatient Medications:     Calcium-Phosphorus-Vitamin D (CALCIUM/VITAMIN D3/ADULT GUMMY PO), Take  by mouth., Disp: , Rfl:     clindamycin (CLEOCIN T) 1 % external solution, Apply  topically to the appropriate area as directed 2 (Two) Times a Day., Disp: 60 mL, Rfl: 6    escitalopram (Lexapro) 10 MG tablet, Take 1 tablet by mouth Daily., Disp: 90 tablet, Rfl: 3    Fremanezumab-vfrm (Ajovy) 225 MG/1.5ML solution auto-injector, Inject 225 mg under the skin into the appropriate area as directed Every 30 (Thirty) Days., Disp: 1.5 mL, Rfl: 11    levothyroxine (SYNTHROID, LEVOTHROID) 88 MCG tablet, Take 1 tablet by mouth Daily., Disp: 90 tablet, Rfl: 3    loratadine (CLARITIN) 10 MG tablet, Take 1 tablet by mouth Daily., Disp: ,  Rfl:     melatonin 5 MG tablet tablet, Take 1 tablet by mouth Every Night., Disp: , Rfl:     Multiple Vitamins-Minerals (HAIR SKIN AND NAILS FORMULA PO), Take  by mouth., Disp: , Rfl:     Rimegepant Sulfate (Nurtec) 75 MG tablet dispersible tablet, Take 1 tablet by mouth As Needed. Take at onset of headache, Max of 75 mg/24 hours, Max of 18 tabs/30 days., Disp: 16 tablet, Rfl: 11    traZODone (DESYREL) 50 MG tablet, Take 1 tablet by mouth Every Night., Disp: 90 tablet, Rfl: 1    tretinoin (RETIN-A) 0.025 % cream, Apply 1 Application topically to the appropriate area as directed Every Night., Disp: 45 g, Rfl: 6    ALPRAZolam (XANAX) 0.25 MG tablet, Take 1 tablet by mouth Daily as needed for panic (Patient not taking: Reported on 7/25/2024), Disp: 30 tablet, Rfl: 0    Budesonide (ENTOCORT EC) 3 MG 24 hr capsule, Take 3 capsules by mouth Daily for 120 days. (Patient not taking: Reported on 7/25/2024), Disp: 180 capsule, Rfl: 1    Allergies   Allergen Reactions    Macrobid [Nitrofurantoin Monohyd Macro] Rash    Omnicef [Cefdinir] Nausea And Vomiting       Family History   Problem Relation Age of Onset    Hyperlipidemia Mother     Hypertension Father     Hyperlipidemia Father     Thyroid disease Father     Breast cancer Neg Hx     Ovarian cancer Neg Hx     Uterine cancer Neg Hx     Colon cancer Neg Hx     Colon polyps Neg Hx     Esophageal cancer Neg Hx        Social History     Socioeconomic History    Marital status:    Tobacco Use    Smoking status: Never     Passive exposure: Past    Smokeless tobacco: Never   Vaping Use    Vaping status: Never Used   Substance and Sexual Activity    Alcohol use: Not Currently     Comment: socially    Drug use: No    Sexual activity: Yes     Partners: Male     Birth control/protection: Tubal ligation       Review of Systems  A complete 12 point ros was asked and is negative except for that mentioned above.  In particular:  No fever  No rash  No increased arthralgias  No  worsening edema  No cough  No dyspnea  No chest pain      Vitals:    07/25/24 1456   BP: 90/60   Pulse: 76   Temp: 97.5 °F (36.4 °C)   SpO2: 100%       Physical Exam  General: well developed, well nourished  A+O x 3 NAD  HEENT: NCAT, pupils equal appearing, sclera appear white  NECK: full ROM  Respiratory: symmetric chest rise, normal effort, normal work of breathing, no overt rales  Abdomen: non-distended  Skin: normal color, no jaundice  Neuro: no tremor, no facial droop  Psych: normal mood and affect      Assessment/Plan  Last colonoscopy: 4/2024  Fair to adequate prep, normal TI, 5 mm polyp, biopsies of normal colon showed mild nonspecific colitis; semisolid stool in flexure points    Egd: 2021: no overt celiac disease    1.) Microscopic colitis, unspecified  She had a tortuous colon which makes me think of IBS but her random colon biopsies do show a colitis, acute rather than chronic.    Plan:  Restart 9 mg of budesonide, she can pursue rapid taper after 1 week due to prior experience;    2.) Constipation  ? Medicine induced  Once budesonide helps with diarrhea, consider miralax/mag oxide titration.    Hiro Nguyen MD  7/25/2024

## 2024-07-31 ENCOUNTER — SPECIALTY PHARMACY (OUTPATIENT)
Dept: NEUROLOGY | Facility: CLINIC | Age: 39
End: 2024-07-31
Payer: COMMERCIAL

## 2024-07-31 NOTE — PROGRESS NOTES
Specialty Pharmacy Refill Coordination Note     Regla is a 38 y.o. female contacted today regarding refills of Ajovy due every 25 days as possible if insurance will allow (RTS until 8/4--pt will get and take 8/6 (26 days)) and Nurtec PRN.    Reviewed and verified with patient:       Specialty medication(s) and dose(s) confirmed: yes    Refill Questions      Flowsheet Row Most Recent Value   Changes to allergies? No   Changes to medications? No   New conditions or infections since last clinic visit No   Unplanned office visit, urgent care, ED, or hospital admission in the last 4 weeks  No   How does patient/caregiver feel medication is working? Very good   Financial problems or insurance changes  No   If yes, describe changes in insurance or financial issues. n/a   Since the previous refill, were any specialty medication doses or scheduled injections missed or delayed?  No   If yes, please provide the amount n/a   Why were doses missed? n/a   Does this patient require a clinical escalation to a pharmacist? No            Delivery Questions      Flowsheet Row Most Recent Value   Delivery method [employer]   Delivery address verified with patient/caregiver? Yes   Delivery address Home   Number of medications in delivery 2   Medication(s) being filled and delivered Fremanezumab-Vfrm, Rimegepant Sulfate   Doses left of specialty medications Ajovy due every 25 days as possible if insurance will allow (RTS until 8/4--pt will get and take 8/6) and a few nurtec   Copay verified? Yes   Copay amount $15 Ajovy and $0 Nurtec   Copay form of payment Credit/debit on file   Ship Date 8/5   Delivery Date 8/6   Signature Required No               Follow-up: ~20 day(s)     Yojana Guillermo, KatieD

## 2024-09-04 ENCOUNTER — SPECIALTY PHARMACY (OUTPATIENT)
Dept: PHARMACY | Facility: HOSPITAL | Age: 39
End: 2024-09-04
Payer: COMMERCIAL

## 2024-09-04 NOTE — PROGRESS NOTES
Specialty Pharmacy Refill Coordination Note     Regla is a 38 y.o. female contacted today regarding refills of Ajovy specialty medication(s). Patient will take injection on 9/6/24.    Reviewed and verified with patient:       Specialty medication(s) and dose(s) confirmed: yes    Refill Questions      Flowsheet Row Most Recent Value   Changes to allergies? No   Changes to medications? No   New conditions or infections since last clinic visit No   Unplanned office visit, urgent care, ED, or hospital admission in the last 4 weeks  No   How does patient/caregiver feel medication is working? Very good   Financial problems or insurance changes  No   If yes, describe changes in insurance or financial issues. n/a   Since the previous refill, were any specialty medication doses or scheduled injections missed or delayed?  No   If yes, please provide the amount n/a   Why were doses missed? n/a   Does this patient require a clinical escalation to a pharmacist? No            Delivery Questions      Flowsheet Row Most Recent Value   Delivery method UPS   Delivery address verified with patient/caregiver? Yes   Delivery address Home   Number of medications in delivery 1   Medication(s) being filled and delivered Fremanezumab-Vfrm   Doses left of specialty medications 0   Copay verified? Yes   Copay amount 15.00   Copay form of payment Credit/debit on file   Ship Date 9/5   Delivery Date 9/6   Signature Required No              Medication Adherence    Adherence tools used: directed education  Support network for adherence: family member          Follow-up: 28 day(s)     Amy Kaiser, Pharmacy Technician  Specialty Pharmacy Technician

## 2024-09-16 ENCOUNTER — SPECIALTY PHARMACY (OUTPATIENT)
Dept: GENERAL RADIOLOGY | Facility: HOSPITAL | Age: 39
End: 2024-09-16
Payer: COMMERCIAL

## 2024-09-20 ENCOUNTER — LAB (OUTPATIENT)
Facility: HOSPITAL | Age: 39
End: 2024-09-20
Payer: COMMERCIAL

## 2024-09-20 DIAGNOSIS — Z00.00 ROUTINE GENERAL MEDICAL EXAMINATION AT A HEALTH CARE FACILITY: Primary | ICD-10-CM

## 2024-09-20 DIAGNOSIS — Z00.00 ROUTINE GENERAL MEDICAL EXAMINATION AT A HEALTH CARE FACILITY: ICD-10-CM

## 2024-09-20 DIAGNOSIS — R53.82 CHRONIC FATIGUE: ICD-10-CM

## 2024-09-20 LAB
DEPRECATED RDW RBC AUTO: 44 FL (ref 37–54)
ERYTHROCYTE [DISTWIDTH] IN BLOOD BY AUTOMATED COUNT: 12.4 % (ref 12.3–15.4)
HCT VFR BLD AUTO: 42.5 % (ref 34–46.6)
HGB BLD-MCNC: 14 G/DL (ref 12–15.9)
MCH RBC QN AUTO: 32.1 PG (ref 26.6–33)
MCHC RBC AUTO-ENTMCNC: 32.9 G/DL (ref 31.5–35.7)
MCV RBC AUTO: 97.5 FL (ref 79–97)
PLATELET # BLD AUTO: 198 10*3/MM3 (ref 140–450)
PMV BLD AUTO: 10.1 FL (ref 6–12)
RBC # BLD AUTO: 4.36 10*6/MM3 (ref 3.77–5.28)
WBC NRBC COR # BLD AUTO: 4.46 10*3/MM3 (ref 3.4–10.8)

## 2024-09-20 PROCEDURE — 82746 ASSAY OF FOLIC ACID SERUM: CPT

## 2024-09-20 PROCEDURE — 36415 COLL VENOUS BLD VENIPUNCTURE: CPT

## 2024-09-20 PROCEDURE — 86665 EPSTEIN-BARR CAPSID VCA: CPT

## 2024-09-20 PROCEDURE — 83540 ASSAY OF IRON: CPT

## 2024-09-20 PROCEDURE — 84466 ASSAY OF TRANSFERRIN: CPT

## 2024-09-20 PROCEDURE — 86663 EPSTEIN-BARR ANTIBODY: CPT

## 2024-09-20 PROCEDURE — 80050 GENERAL HEALTH PANEL: CPT

## 2024-09-20 PROCEDURE — 80061 LIPID PANEL: CPT

## 2024-09-20 PROCEDURE — 82607 VITAMIN B-12: CPT

## 2024-09-21 LAB
ALBUMIN SERPL-MCNC: 4.5 G/DL (ref 3.5–5.2)
ALBUMIN/GLOB SERPL: 1.9 G/DL
ALP SERPL-CCNC: 49 U/L (ref 39–117)
ALT SERPL W P-5'-P-CCNC: 14 U/L (ref 1–33)
ANION GAP SERPL CALCULATED.3IONS-SCNC: 12 MMOL/L (ref 5–15)
AST SERPL-CCNC: 20 U/L (ref 1–32)
BILIRUB SERPL-MCNC: 0.3 MG/DL (ref 0–1.2)
BUN SERPL-MCNC: 10 MG/DL (ref 6–20)
BUN/CREAT SERPL: 11.9 (ref 7–25)
CALCIUM SPEC-SCNC: 9.6 MG/DL (ref 8.6–10.5)
CHLORIDE SERPL-SCNC: 102 MMOL/L (ref 98–107)
CHOLEST SERPL-MCNC: 177 MG/DL (ref 0–200)
CO2 SERPL-SCNC: 26 MMOL/L (ref 22–29)
CREAT SERPL-MCNC: 0.84 MG/DL (ref 0.57–1)
EGFRCR SERPLBLD CKD-EPI 2021: 90.2 ML/MIN/1.73
FOLATE SERPL-MCNC: >20 NG/ML (ref 4.78–24.2)
GLOBULIN UR ELPH-MCNC: 2.4 GM/DL
GLUCOSE SERPL-MCNC: 75 MG/DL (ref 65–99)
HDLC SERPL-MCNC: 61 MG/DL (ref 40–60)
IRON 24H UR-MRATE: 123 MCG/DL (ref 37–145)
IRON SATN MFR SERPL: 33 % (ref 20–50)
LDLC SERPL CALC-MCNC: 101 MG/DL (ref 0–100)
LDLC/HDLC SERPL: 1.64 {RATIO}
POTASSIUM SERPL-SCNC: 4.3 MMOL/L (ref 3.5–5.2)
PROT SERPL-MCNC: 6.9 G/DL (ref 6–8.5)
SODIUM SERPL-SCNC: 140 MMOL/L (ref 136–145)
TIBC SERPL-MCNC: 375 MCG/DL (ref 298–536)
TRANSFERRIN SERPL-MCNC: 252 MG/DL (ref 200–360)
TRIGL SERPL-MCNC: 81 MG/DL (ref 0–150)
TSH SERPL DL<=0.05 MIU/L-ACNC: 1.89 UIU/ML (ref 0.27–4.2)
VIT B12 BLD-MCNC: 1164 PG/ML (ref 211–946)
VLDLC SERPL-MCNC: 15 MG/DL (ref 5–40)

## 2024-09-23 LAB
EBV EA-D IGG SER-ACNC: 89.4 U/ML (ref 0–8.9)
EBV VCA IGG SER IA-ACNC: 169 U/ML (ref 0–17.9)

## 2024-09-24 RX ORDER — VALACYCLOVIR HYDROCHLORIDE 500 MG/1
500 TABLET, FILM COATED ORAL DAILY
Qty: 30 TABLET | Refills: 0 | Status: SHIPPED | OUTPATIENT
Start: 2024-09-24

## 2024-10-02 ENCOUNTER — SPECIALTY PHARMACY (OUTPATIENT)
Dept: NEUROLOGY | Facility: CLINIC | Age: 39
End: 2024-10-02
Payer: COMMERCIAL

## 2024-10-02 NOTE — PROGRESS NOTES
Specialty Pharmacy Refill Coordination Note     Regla is a 38 y.o. female contacted today regarding refills of Ajovy and Nurtec specialty medication(s). Patient due for next injection on 10/4/24.    Reviewed and verified with patient:       Specialty medication(s) and dose(s) confirmed: yes    Refill Questions      Flowsheet Row Most Recent Value   Changes to allergies? No   Changes to medications? No   New conditions or infections since last clinic visit No   Unplanned office visit, urgent care, ED, or hospital admission in the last 4 weeks  No   How does patient/caregiver feel medication is working? Very good   Financial problems or insurance changes  No   If yes, describe changes in insurance or financial issues. n/a   Since the previous refill, were any specialty medication doses or scheduled injections missed or delayed?  No   If yes, please provide the amount n/a   Why were doses missed? n/a   Does this patient require a clinical escalation to a pharmacist? No            Delivery Questions      Flowsheet Row Most Recent Value   Delivery method UPS   Delivery address verified with patient/caregiver? Yes   Delivery address Home   Number of medications in delivery 2   Medication(s) being filled and delivered Fremanezumab-Vfrm, Rimegepant Sulfate   Doses left of specialty medications Ajovy-0, Nurtec- a couple   Copay verified? Yes   Copay amount 15.00   Copay form of payment Credit/debit on file   Ship Date 10/3   Delivery Date 10/4   Signature Required No              Medication Adherence    Adherence tools used: directed education  Support network for adherence: family member          Follow-up: 28 day(s)     Amy Kaiser, Pharmacy Technician  Specialty Pharmacy Technician

## 2024-10-31 ENCOUNTER — OFFICE VISIT (OUTPATIENT)
Dept: ENDOCRINOLOGY | Facility: CLINIC | Age: 39
End: 2024-10-31
Payer: COMMERCIAL

## 2024-10-31 VITALS
SYSTOLIC BLOOD PRESSURE: 104 MMHG | DIASTOLIC BLOOD PRESSURE: 62 MMHG | HEIGHT: 62 IN | HEART RATE: 78 BPM | BODY MASS INDEX: 21.16 KG/M2 | WEIGHT: 115 LBS | OXYGEN SATURATION: 99 %

## 2024-10-31 DIAGNOSIS — E06.3 HASHIMOTO'S THYROIDITIS: ICD-10-CM

## 2024-10-31 DIAGNOSIS — E03.9 ACQUIRED HYPOTHYROIDISM: Primary | ICD-10-CM

## 2024-10-31 PROCEDURE — 99213 OFFICE O/P EST LOW 20 MIN: CPT | Performed by: INTERNAL MEDICINE

## 2024-10-31 RX ORDER — LEVOTHYROXINE SODIUM 88 UG/1
88 TABLET ORAL DAILY
Qty: 90 TABLET | Refills: 3 | Status: SHIPPED | OUTPATIENT
Start: 2024-10-31

## 2024-10-31 NOTE — PROGRESS NOTES
"     Office Note      Date: 10/31/2024  Patient Name: Regla West  MRN: [id number]  : 1985    Chief Complaint   Patient presents with    Hypothyroidism       History of Present Illness:   Regla West is a 38 y.o. female who presents for Hypothyroidism    She remains on T4 88mcg qd. She is taking this correctly. She isn't taking any interfering meds concurrently. She hasn't noted any change in the size of her neck. She denies any compressive sxs. She denies any sxs of hypo- or hyperthyroidism at this time, aside from some fatigue.      Subjective      Review of Systems:   Review of Systems   Constitutional:  Positive for fatigue.   Cardiovascular: Negative.    Gastrointestinal: Negative.    Endocrine: Negative.        The following portions of the patient's history were reviewed and updated as appropriate: allergies, current medications, past family history, past medical history, past social history, past surgical history, and problem list.    Objective     Visit Vitals  /62   Pulse 78   Ht 157.5 cm (62\")   Wt 52.2 kg (115 lb)   SpO2 99%   BMI 21.03 kg/m²       Physical Exam:  Physical Exam  Constitutional:       Appearance: Normal appearance.   Neck:      Thyroid: No thyroid mass, thyromegaly or thyroid tenderness.   Lymphadenopathy:      Cervical: No cervical adenopathy.   Neurological:      Mental Status: She is alert.         Labs:    TSH  No results found for: \"TSHBASE\"     Free T4  Free T4   Date Value Ref Range Status   10/24/2023 1.15 0.93 - 1.70 ng/dL Final       T3  No results found for: \"V0AGEWI\"      TPO  Thyroid Peroxidase Antibody   Date Value Ref Range Status   2020 228 (H) 0 - 34 IU/mL Final       TG AB  Thyroglobulin Ab   Date Value Ref Range Status   2020 4.6 (H) 0.0 - 0.9 IU/mL Final     Comment:     Thyroglobulin Antibody measured by [facility] [place] [facility]       TG  No results found for: \"THYROGLB\"    CBC w/DIFF  Lab Results   Component Value Date    WBC " 4.46 09/20/2024    RBC 4.36 09/20/2024    HGB 14.0 09/20/2024    HCT 42.5 09/20/2024    MCV 97.5 (H) 09/20/2024    MCH 32.1 09/20/2024    MCHC 32.9 09/20/2024    RDW 12.4 09/20/2024    RDWSD 44.0 09/20/2024    MPV 10.1 09/20/2024     09/20/2024    NEUTRORELPCT 85.0 (H) 09/15/2023    LYMPHORELPCT 6.2 (L) 09/15/2023    MONORELPCT 7.1 09/15/2023    EOSRELPCT 1.1 09/15/2023    BASORELPCT 0.3 09/15/2023    AUTOIGPER 0.3 09/15/2023    NEUTROABS 7.63 (H) 09/15/2023    LYMPHSABS 0.56 (L) 09/15/2023    MONOSABS 0.64 09/15/2023    EOSABS 0.10 09/15/2023    BASOSABS 0.03 09/15/2023    AUTOIGNUM 0.03 09/15/2023    NRBC 0.0 09/15/2023           Assessment / Plan      Assessment & Plan:  Diagnoses and all orders for this visit:    1. Acquired hypothyroidism (Primary)  Assessment & Plan:  Recent TSH at goal at 1.89.  Continue current T4 dose.      2. Hashimoto's thyroiditis  Assessment & Plan:  No goiter on exam.      Other orders  -     levothyroxine (SYNTHROID, LEVOTHROID) 88 MCG tablet; Take 1 tablet by mouth Daily.  Dispense: 90 tablet; Refill: 3      Current Outpatient Medications   Medication Instructions    Ajovy 225 mg, Subcutaneous, Every 30 Days    Budesonide (ENTOCORT EC) 3 MG 24 hr capsule Take 3 capsules by mouth daily x 1 week and proceed with taper as outlined in clinic    Calcium-Phosphorus-Vitamin D (CALCIUM/VITAMIN D3/ADULT GUMMY PO) Take  by mouth.    clindamycin (CLEOCIN T) 1 % external solution Topical, 2 Times Daily    escitalopram (LEXAPRO) 10 mg, Oral, Daily    levothyroxine (SYNTHROID, LEVOTHROID) 88 mcg, Oral, Daily    loratadine (CLARITIN) 10 mg, Daily    melatonin 5 mg, Nightly    Multiple Vitamins-Minerals (HAIR SKIN AND NAILS FORMULA PO) Take  by mouth.    Rimegepant Sulfate (Nurtec) 75 MG tablet dispersible tablet Take 1 tablet by mouth As Needed. Take at onset of headache, Max of 75 mg/24 hours, Max of 18 tabs/30 days.    traZODone (DESYREL) 50 mg, Oral, Nightly    tretinoin (RETIN-A) 0.025 %  cream 1 Application, Topical, Nightly    valACYclovir (VALTREX) 500 mg, Oral, Daily      Return in about 1 year (around 10/31/2025) for Recheck with TSH.    Electronically signed by: Johnny Jones MD  10/31/2024

## 2024-11-05 ENCOUNTER — SPECIALTY PHARMACY (OUTPATIENT)
Dept: NEUROLOGY | Facility: CLINIC | Age: 39
End: 2024-11-05
Payer: COMMERCIAL

## 2024-11-05 NOTE — PROGRESS NOTES
Specialty Pharmacy Refill Coordination Note     Regla is a 38 y.o. female contacted today regarding refills of Ajovy and Nurtec specialty medication(s).    Reviewed and verified with patient:       Specialty medication(s) and dose(s) confirmed: yes    Refill Questions      Flowsheet Row Most Recent Value   Changes to allergies? No   Changes to medications? No   New conditions or infections since last clinic visit No   Unplanned office visit, urgent care, ED, or hospital admission in the last 4 weeks  No   How does patient/caregiver feel medication is working? Very good   Financial problems or insurance changes  No   If yes, describe changes in insurance or financial issues. n/a   Since the previous refill, were any specialty medication doses or scheduled injections missed or delayed?  No   If yes, please provide the amount n/a   Why were doses missed? n/a   Does this patient require a clinical escalation to a pharmacist? No            Delivery Questions      Flowsheet Row Most Recent Value   Delivery method UPS   Delivery address verified with patient/caregiver? Yes   Delivery address Home   Number of medications in delivery 2   Medication(s) being filled and delivered Rimegepant Sulfate (Nurtec), Fremanezumab-vfrm (Ajovy)   Doses left of specialty medications Ajovy-0, Nurtec- a couple   Copay verified? Yes   Copay amount 0.00   Copay form of payment No copayment ($0)   Ship Date 11/6   Delivery Date 11/7   Signature Required No              Medication Adherence    Adherence tools used: directed education  Support network for adherence: family member          Follow-up: 28 day(s)       Amy Kaiser, Pharmacy Technician  Specialty Pharmacy Technician

## 2024-12-02 ENCOUNTER — SPECIALTY PHARMACY (OUTPATIENT)
Dept: NEUROLOGY | Facility: CLINIC | Age: 39
End: 2024-12-02
Payer: COMMERCIAL

## 2024-12-02 NOTE — PROGRESS NOTES
Specialty Pharmacy Refill Coordination Note     Regla is a 38 y.o. female contacted today regarding refills of  Ajovy and Nurtec specialty medication(s).    Reviewed and verified with patient:       Specialty medication(s) and dose(s) confirmed: yes    Refill Questions      Flowsheet Row Most Recent Value   Changes to allergies? No   Changes to medications? No   New conditions or infections since last clinic visit No   Unplanned office visit, urgent care, ED, or hospital admission in the last 4 weeks  No   How does patient/caregiver feel medication is working? Very good   Financial problems or insurance changes  No   Since the previous refill, were any specialty medication doses or scheduled injections missed or delayed?  No   Does this patient require a clinical escalation to a pharmacist? No            Delivery Questions      Flowsheet Row Most Recent Value   Delivery method UPS   Delivery address verified with patient/caregiver? No   Delivery address Home   Number of medications in delivery 2   Medication(s) being filled and delivered Rimegepant Sulfate (Nurtec), Fremanezumab-vfrm (Ajovy)   Doses left of specialty medications Ajovy - 0,  Nurtec - 3   Copay verified? Yes   Copay amount $0   Copay form of payment No copayment ($0)   Ship Date 12/3/24   Delivery Date 12/3/24   Signature Required No              Medication Adherence    Adherence tools used: directed education  Support network for adherence: family member          Follow-up: 28 day(s)     Terri Coyne RP

## 2024-12-04 ENCOUNTER — OFFICE VISIT (OUTPATIENT)
Dept: GASTROENTEROLOGY | Facility: CLINIC | Age: 39
End: 2024-12-04
Payer: COMMERCIAL

## 2024-12-04 VITALS
OXYGEN SATURATION: 98 % | BODY MASS INDEX: 20.8 KG/M2 | WEIGHT: 117.4 LBS | DIASTOLIC BLOOD PRESSURE: 62 MMHG | HEART RATE: 81 BPM | TEMPERATURE: 97.3 F | SYSTOLIC BLOOD PRESSURE: 98 MMHG | HEIGHT: 63 IN

## 2024-12-04 DIAGNOSIS — K52.839 MICROSCOPIC COLITIS, UNSPECIFIED MICROSCOPIC COLITIS TYPE: Primary | ICD-10-CM

## 2024-12-04 PROCEDURE — 99214 OFFICE O/P EST MOD 30 MIN: CPT | Performed by: INTERNAL MEDICINE

## 2024-12-04 RX ORDER — BUDESONIDE 3 MG/1
6 CAPSULE, COATED PELLETS ORAL EVERY MORNING
COMMUNITY

## 2024-12-04 NOTE — PROGRESS NOTES
PCP: Kvng Cole PA    No chief complaint on file.      History of Present Illness:   Relga West is a 38 y.o. female who presents to GI clinic as a follow up for microscopic colitis. Recent alterring of bowel habits due to antibiotics. She has weaned down to budesonide once daily. Having intermittent heartburn and takes ppi prn.    Past Medical History:   Diagnosis Date    Anemia     Taking iron    Hashimoto's thyroiditis     Headache     Hypothyroidism     Migraine     Pain in joint, lower leg        Past Surgical History:   Procedure Laterality Date     SECTION WITH TUBAL Bilateral 2018    Procedure:  SECTION PRIMARY WITH TUBAL * 39 WKS *;  Surgeon: Cinda Greenberg MD;  Location: [facility] LABOR DELIVERY;  Service: Obstetrics/Gynecology    COLONOSCOPY      D & C HYSTEROSCOPY ENDOMETRIAL ABLATION DIAGNOSTIC LAPAROSCOPY  2023    OOPHORECTOMY Right 2023    SALPINGECTOMY Bilateral 2023    SEPTOPLASTY      2017    TUBAL ABDOMINAL LIGATION      UPPER GASTROINTESTINAL ENDOSCOPY      WISDOM TOOTH EXTRACTION           Current Outpatient Medications:     Calcium-Phosphorus-Vitamin D (CALCIUM/VITAMIN D3/ADULT GUMMY PO), Take  by mouth., Disp: , Rfl:     clindamycin (CLEOCIN T) 1 % external solution, Apply  topically to the appropriate area as directed 2 (Two) Times a Day., Disp: 60 mL, Rfl: 6    escitalopram (Lexapro) 10 MG tablet, Take 1 tablet by mouth Daily., Disp: 90 tablet, Rfl: 3    Fremanezumab-vfrm (Ajovy) 225 MG/1.5ML solution auto-injector, Inject 225 mg under the skin into the appropriate area as directed Every 30 (Thirty) Days., Disp: 1.5 mL, Rfl: 11    levothyroxine (SYNTHROID, LEVOTHROID) 88 MCG tablet, Take 1 tablet by mouth Daily., Disp: 90 tablet, Rfl: 3    loratadine (CLARITIN) 10 MG tablet, Take 1 tablet by mouth Daily., Disp: , Rfl:     melatonin 5 MG tablet tablet, Take 1 tablet by mouth Every Night., Disp: , Rfl:     Multiple Vitamins-Minerals (HAIR SKIN AND  NAILS FORMULA PO), Take  by mouth., Disp: , Rfl:     Rimegepant Sulfate (Nurtec) 75 MG tablet dispersible tablet, Take 1 tablet by mouth As Needed. Take at onset of headache, Max of 75 mg/24 hours, Max of 18 tabs/30 days., Disp: 16 tablet, Rfl: 11    traZODone (DESYREL) 50 MG tablet, Take 1 tablet by mouth Every Night., Disp: 90 tablet, Rfl: 1    tretinoin (RETIN-A) 0.025 % cream, Apply 1 Application topically to the appropriate area as directed Every Night., Disp: 45 g, Rfl: 6    valACYclovir (Valtrex) 500 MG tablet, Take 1 tablet by mouth Daily. (Patient not taking: Reported on 10/31/2024), Disp: 30 tablet, Rfl: 0    Allergies   Allergen Reactions    Macrobid [Nitrofurantoin Monohyd Macro] Rash    Omnicef [Cefdinir] Nausea And Vomiting       Family History   Problem Relation Age of Onset    Hyperlipidemia Mother     Hypertension Father     Hyperlipidemia Father     Thyroid disease Father     Breast cancer Neg Hx     Ovarian cancer Neg Hx     Uterine cancer Neg Hx     Colon cancer Neg Hx     Colon polyps Neg Hx     Esophageal cancer Neg Hx        Social History     Socioeconomic History    Marital status:    Tobacco Use    Smoking status: Never     Passive exposure: Past    Smokeless tobacco: Never   Vaping Use    Vaping status: Never Used   Substance and Sexual Activity    Alcohol use: Not Currently     Comment: socially    Drug use: No    Sexual activity: Yes     Partners: Male     Birth control/protection: Tubal ligation       Review of Systems  A complete 12 point ros was asked and is negative except for that mentioned above.  In particular:  No fever  No rash  No increased arthralgias  No worsening edema  No cough  No dyspnea  No chest pain      There were no vitals filed for this visit.    Physical Exam  General: well developed, well nourished  A+O x 3 NAD  HEENT: NCAT, pupils equal appearing, sclera appear white  NECK: full ROM  Respiratory: symmetric chest rise, normal effort, normal work of  breathing, no overt rales  Abdomen: non-distended  Skin: normal color, no jaundice  Neuro: no tremor, no facial droop  Psych: normal mood and affect      Assessment/Plan  Last colonoscopy: 4/2024  Fair to adequate prep, normal TI, 5 mm polyp, biopsies of normal colon showed mild nonspecific colitis; semisolid stool in flexure points    Egd: 2021: no overt celiac disease    1.) Microscopic colitis, unspecified  She had a tortuous colon which makes me think of IBS but her random colon biopsies do show a colitis, acute rather than chronic.    Plan:  Continue lowest effective dose of budesonide. She has had on/off course for years.  If no symptoms on qod dosing, she may consider holding     2.) Constipation  ? Medicine induced  Continue magnesium    3.) GERD  Egd: 2021, small hiatal hernia, lax les  She has not been taking ppi daily due to possible associated with microscopic colitis.  Plan:  - pepcid daily  - consider relook egd if symptoms persist  Hiro Nguyen MD  12/4/2024

## 2024-12-30 ENCOUNTER — SPECIALTY PHARMACY (OUTPATIENT)
Dept: PULMONOLOGY | Facility: CLINIC | Age: 39
End: 2024-12-30
Payer: COMMERCIAL

## 2024-12-30 NOTE — PROGRESS NOTES
Specialty Pharmacy Refill Coordination Note     Regla is a 39 y.o. female contacted today regarding refills of  Ajovy and Nurtec specialty medication(s).    Reviewed and verified with patient:       Specialty medication(s) and dose(s) confirmed: yes    Refill Questions      Flowsheet Row Most Recent Value   Changes to allergies? No   Changes to medications? No   New conditions or infections since last clinic visit No   Unplanned office visit, urgent care, ED, or hospital admission in the last 4 weeks  No   How does patient/caregiver feel medication is working? Very good   Financial problems or insurance changes  No   Since the previous refill, were any specialty medication doses or scheduled injections missed or delayed?  No   Does this patient require a clinical escalation to a pharmacist? No            Delivery Questions      Flowsheet Row Most Recent Value   Delivery method UPS   Delivery address verified with patient/caregiver? Yes   Delivery address Home   Number of medications in delivery 2   Medication(s) being filled and delivered Fremanezumab-vfrm (Ajovy), Rimegepant Sulfate (Nurtec)   Doses left of specialty medications Ajovy 0 and Nurtec a couple   Copay verified? Yes   Copay amount Ajovy $15 and Nurtec $0   Copay form of payment Credit/debit on file   Ship Date 12/30/24   Delivery Date 12/31/24   Signature Required No              Medication Adherence    Adherence tools used: directed education  Support network for adherence: family member          Follow-up: 30 day(s)     Dayana Rhoeds, Pharmacy Technician  Specialty Pharmacy Technician

## 2025-01-20 ENCOUNTER — SPECIALTY PHARMACY (OUTPATIENT)
Dept: GENERAL RADIOLOGY | Facility: HOSPITAL | Age: 40
End: 2025-01-20
Payer: COMMERCIAL

## 2025-01-20 NOTE — PROGRESS NOTES
Specialty Pharmacy Patient Management Program  Neurology Reassessment     Regla West is a 39 y.o. female with migraines seen by a Neurology provider and enrolled in the Neurology Patient Management program offered by [facility].  A follow-up outreach was conducted, including assessment of continued therapy appropriateness, medication adherence, and side effect incidence and management for Ajovy and Nurtec.     Changes to Insurance Coverage or Financial Support  Affirmed Rx  Ajovy and Nurtec Copay Cards     Relevant Past Medical History and Comorbidities  Relevant medical history and concomitant health conditions were discussed with the patient. The patient's chart has been reviewed for relevant past medical history and comorbid health conditions and updated as necessary.   Past Medical History:   Diagnosis Date    Anemia     Taking iron    Hashimoto's thyroiditis     Headache     Hypothyroidism     Migraine     Pain in joint, lower leg      Social History     Socioeconomic History    Marital status:    Tobacco Use    Smoking status: Never     Passive exposure: Past    Smokeless tobacco: Never   Vaping Use    Vaping status: Never Used   Substance and Sexual Activity    Alcohol use: Not Currently     Comment: socially    Drug use: No    Sexual activity: Yes     Partners: Male     Birth control/protection: Tubal ligation     Problem list reviewed by Terri Coyne RPH on 1/20/2025 at  9:28 AM    Hospitalizations and Urgent Care Since Last Assessment  ED Visits, Admissions, or Hospitalizations: None   Urgent Office Visits: None     Allergies  Known allergies and reactions were discussed with the patient. The patient's chart has been reviewed for allergy information and updated as necessary.   Allergies   Allergen Reactions    Macrobid [Nitrofurantoin Monohyd Macro] Rash    Omnicef [Cefdinir] Nausea And Vomiting     Allergies reviewed by Terri Coyne RPH on 1/20/2025  at  9:28 AM    Relevant Laboratory Values  Common labs          9/20/2024    10:42   Common Labs   Glucose 75    BUN 10    Creatinine 0.84    Sodium 140    Potassium 4.3    Chloride 102    Calcium 9.6    Albumin 4.5    Total Bilirubin 0.3    Alkaline Phosphatase 49    AST (SGOT) 20    ALT (SGPT) 14    WBC 4.46    Hemoglobin 14.0    Hematocrit 42.5    Platelets 198    Total Cholesterol 177    Triglycerides 81    HDL Cholesterol 61    LDL Cholesterol  101        Current Medication List  This medication list has been reviewed with the patient and evaluated for any interactions or necessary modifications/recommendations, and updated to include all prescription medications, OTC medications, and supplements the patient is currently taking.  This list reflects what is contained in the patient's profile, which has also been marked as reviewed to communicate to other providers it is the most up to date version of the patient's current medication therapy.     Current Outpatient Medications:     Budesonide (ENTOCORT EC) 3 MG 24 hr capsule, Take 2 capsules by mouth Every Morning., Disp: , Rfl:     Calcium-Phosphorus-Vitamin D (CALCIUM/VITAMIN D3/ADULT GUMMY PO), Take  by mouth. (Patient not taking: Reported on 12/4/2024), Disp: , Rfl:     clindamycin (CLEOCIN T) 1 % external solution, Apply  topically to the appropriate area as directed 2 (Two) Times a Day., Disp: 60 mL, Rfl: 6    escitalopram (Lexapro) 10 MG tablet, Take 1 tablet by mouth Daily., Disp: 90 tablet, Rfl: 3    Fremanezumab-vfrm (Ajovy) 225 MG/1.5ML solution auto-injector, Inject 225 mg under the skin into the appropriate area as directed Every 30 (Thirty) Days., Disp: 1.5 mL, Rfl: 11    levothyroxine (SYNTHROID, LEVOTHROID) 88 MCG tablet, Take 1 tablet by mouth Daily., Disp: 90 tablet, Rfl: 3    loratadine (CLARITIN) 10 MG tablet, Take 1 tablet by mouth Daily., Disp: , Rfl:     melatonin 5 MG tablet tablet, Take 1 tablet by mouth Every Night., Disp: , Rfl:      Multiple Vitamins-Minerals (HAIR SKIN AND NAILS FORMULA PO), Take  by mouth., Disp: , Rfl:     rimegepant sulfate (Nurtec) 75 MG tablet, Take 1 tablet by mouth As Needed. Take at onset of headache, Max of 75 mg/24 hours, Max of 18 tabs/30 days., Disp: 16 tablet, Rfl: 11    traZODone (DESYREL) 50 MG tablet, Take 1 tablet by mouth Every Night., Disp: 90 tablet, Rfl: 1    tretinoin (RETIN-A) 0.025 % cream, Apply 1 Application topically to the appropriate area as directed Every Night. (Patient not taking: Reported on 12/4/2024), Disp: 45 g, Rfl: 6    valACYclovir (Valtrex) 500 MG tablet, Take 1 tablet by mouth Daily. (Patient not taking: Reported on 12/4/2024), Disp: 30 tablet, Rfl: 0    Medicines reviewed by Terri Coyne [facility] on 1/20/2025 at  9:28 AM    Drug Interactions  None     Adverse Drug Reactions  Medication tolerability: Tolerating with no to minimal ADRs          Medication plan: Continue therapy with normal follow-up  Plan for ADR Management: Not required      Adherence, Self-Administration, and Current Therapy Problems  Adherence related patient's specialty therapy was discussed with the patient. The Adherence segment of this outreach has been reviewed and updated.   Adherence Questions  Linked Medication(s) Assessed: Fremanezumab-vfrm (Ajovy), Rimegepant Sulfate (Nurtec)  On average, how many doses/injections does the patient miss per month?: 0  What are the identified reasons for non-adherence or missed doses? : no problems identified  What is the estimated medication adherence level?: % (Nurtec - PRN)  Based on the patient/caregiver response and refill history, does this patient require an MTP to track adherence improvements?: no    Additional Barriers to Patient Self-Administration: None  Methods for Supporting Patient Self-Administration: Not required    Recently Close Medication Therapy Problems  No medication therapy recommendations to display  Open Medication Therapy Problems  No  medication therapy recommendations to display     Goals of Therapy  Goals related to the patient's specialty therapy was discussed with the patient. The Patient Goals segment of this outreach has been reviewed and updated.    Goals Addressed Today        Specialty Pharmacy General Goal      Decrease frequency, severity and duration of migraine attacks from baseline (10-12 per month)               Quality of Life Assessment   Quality of Life related to the patient's enrollment in the patient management program and services provided was discussed with the patient. The QOL segment of this outreach has been reviewed and updated.   Quality of Life Improvement Scale: 9-A good deal better    Reassessment Plan & Follow-Up  Medication Therapy Changes: Continue Ajovy 225 mg subcutaneously monthly and Nurtec 75 mg PO once daily as needed for migraines. - Take 1 tablet at the onset of headache, Max of 75 mg/24 hours.  Related Plans, Therapy Recommendations, or Issues to Be Addressed: None  Pharmacist to perform regular reassessments no more than (6) months from the previous assessment.  Care Coordinator to set up future refill outreaches, coordinate prescription delivery, and escalate clinical questions to pharmacist.     Attestation  Therapeutic appropriateness: Appropriate  I attest the patient was actively involved in and has agreed to the above plan of care. If the prescribed therapy is at any point deemed not appropriate based on the current or future assessments, a consultation will be initiated with the patient's specialty care provider to determine the best course of action. The revised plan of therapy will be documented along with any additional patient education provided. Discussed aforementioned material with patient via telemedicine.    Katie CamaraD, ADRIÁN  [facility] Specialty Pharmacist, Neurology  1/20/2025  09:28 EST

## 2025-01-22 ENCOUNTER — OFFICE VISIT (OUTPATIENT)
Dept: FAMILY MEDICINE CLINIC | Facility: CLINIC | Age: 40
End: 2025-01-22
Payer: COMMERCIAL

## 2025-01-22 VITALS
HEIGHT: 63 IN | WEIGHT: 116.8 LBS | DIASTOLIC BLOOD PRESSURE: 60 MMHG | BODY MASS INDEX: 20.7 KG/M2 | SYSTOLIC BLOOD PRESSURE: 100 MMHG | RESPIRATION RATE: 14 BRPM | OXYGEN SATURATION: 100 % | HEART RATE: 83 BPM

## 2025-01-22 DIAGNOSIS — F41.1 GENERALIZED ANXIETY DISORDER: ICD-10-CM

## 2025-01-22 DIAGNOSIS — M25.50 ARTHRALGIA, UNSPECIFIED JOINT: ICD-10-CM

## 2025-01-22 PROCEDURE — 99214 OFFICE O/P EST MOD 30 MIN: CPT | Performed by: PHYSICIAN ASSISTANT

## 2025-01-22 RX ORDER — ESCITALOPRAM OXALATE 10 MG/1
10 TABLET ORAL DAILY
Qty: 90 TABLET | Refills: 3 | Status: SHIPPED | OUTPATIENT
Start: 2025-01-22

## 2025-01-22 NOTE — PROGRESS NOTES
Dorsally from there Subjective   Regla West is a 39 y.o. female  Joint Pain (Both hands/feet ) and Anxiety (RF Lexapro )      Joint Pain  Symptoms include myalgias.    Pertinent negative symptoms include no chest pain, no cough, no fatigue, no headaches, no nausea, no rash and no weakness.   Anxiety   Patient reports no chest pain, dizziness, nausea, palpitations or shortness of breath.     History of Present Illness  The patient presents for evaluation of joint pain, fatigue, colitis, eczema, and flat feet.    She reports persistent fatigue, often struggling to stay awake beyond 8:30 PM. Despite maintaining an active lifestyle, including exercising 4 to 5 days a week and achieving a daily step count of at least 10,000, she experiences significant discomfort. Two weeks ago, she returned home from work in tears due to severe, widespread pain, including in her fingers and feet, which was so intense that it prevented her from typing. She also reports frequent awakenings at night due to pain. She has been dealing with colitis since her 20s and has been on Entocort for symptom management. . She has a history of pleurisy and has tried various supplements for her joint pain, but none have been effective. She has a history of positive ALEKSANDER and Hashimoto's disease. Recently, she was diagnosed with colitis, which she describes as feeling like her body is in a state of constant inflammation. She has been dealing with colitis since her 20s and has been on Entocort for symptom management. She is concerned about the potential masking effect of Entocort on her symptoms.    She has been dealing with colitis since her 20s and has been on Entocort for symptom management. She is concerned about the potential masking effect of Entocort on her symptoms. She attempted to wean off Entocort over the holidays, as advised by Dr. Nguyen, but this resulted in a severe flare-up of her colitis, characterized by multiple urgent bathroom  visits at night.    Concurrently, she experienced a flare-up of her eczema and the onset of joint pain. She has been dealing with colitis since her 20s and has been on Entocort for symptom management. She is concerned about the potential masking effect of Entocort on her symptoms.    She has flat feet and poor arches, which cause her pain and discomfort during activities. She also reports poor posture and suspects she may have osteoporosis. She has difficulty walking without shoes due to foot pain and recalls a trip to [place] last year where she had to wear a foot brace due to severe foot pain. She also reports waking up at night due to ankle pain caused by the weight of blankets on her feet. She is considering getting inserts from the [employer] and is curious about the potential benefits of creatine supplementation, given her vegetarian diet and low protein intake.    She takes 25 mg of trazodone at night to aid sleep, typically waking up around 4 or 5 AM. She reports difficulty keeping her eyes open when stationary.    MEDICATIONS  Current: Trazodone, Lexapro, Entocort    The following portions of the patient's history were reviewed and updated as appropriate: allergies, current medications, past social history and problem list    Review of Systems   Constitutional:  Negative for fatigue and unexpected weight change.   Respiratory:  Negative for cough, chest tightness and shortness of breath.    Cardiovascular:  Negative for chest pain, palpitations and leg swelling.   Gastrointestinal:  Negative for nausea.   Musculoskeletal:  Positive for arthralgias, back pain and myalgias.   Skin:  Negative for color change and rash.   Neurological:  Negative for dizziness, syncope, weakness and headaches.       Objective     Vitals:    01/22/25 1304   BP: 100/60   Pulse: 83   Resp: 14   SpO2: 100%       Physical Exam  Vitals and nursing note reviewed.   Constitutional:       General: She is not in acute distress.      Appearance: Normal appearance. She is well-developed. She is not ill-appearing, toxic-appearing or diaphoretic.   Neck:      Vascular: No carotid bruit or JVD.   Cardiovascular:      Rate and Rhythm: Normal rate and regular rhythm.      Pulses: Normal pulses.      Heart sounds: Normal heart sounds. No murmur heard.  Pulmonary:      Effort: Pulmonary effort is normal. No respiratory distress.      Breath sounds: Normal breath sounds.   Abdominal:      Palpations: Abdomen is soft.      Tenderness: There is no abdominal tenderness.   Skin:     General: Skin is warm and dry.   Neurological:      Mental Status: She is alert.       Physical Exam      Assessment & Plan   Assessment & Plan  1. Joint pain.  The etiology of the joint pain remains uncertain, with fibromyalgia being a potential diagnosis.  She has been experiencing these symptoms intermittently for the past 4 years. She is advised to maintain a healthy diet, engage in regular exercise, and ensure adequate sleep of 7 to 8 hours per night. A referral to Dr. Dockery for further evaluation will be made.    2. Fatigue.  The fatigue may be related to her joint pain and other underlying conditions.     3. Colitis.  The colitis has been managed with Entocort, but she experienced a severe flare-up when attempting to wean off the medication. She is advised to continue her current medication regimen and avoid weaning off without medical supervision.      6. Medication management.  Her Lexapro prescription has been refilled.    Diagnoses and all orders for this visit:    1. Arthralgia, unspecified joint  -     Ambulatory Referral to Rheumatology    2. Generalized anxiety disorder  -     escitalopram (Lexapro) 10 MG tablet; Take 1 tablet by mouth Daily.  Dispense: 90 tablet; Refill: 3       I spent 15 minutes in patient care: Reviewing records prior to the visit, examining the patient, entering orders and documentation    Part of this note may be an electronic  transcription/translation of spoken language to printed text using the Dragon Dictation System.       Part of this note may be an electronic transcription/translation of spoken language to printed text using the Dragon Dictation System.     Patient or patient representative verbalized consent for the use of Ambient Listening during the visit with  YANN Diana for chart documentation. 1/22/2025  13:12 EST

## 2025-01-28 ENCOUNTER — SPECIALTY PHARMACY (OUTPATIENT)
Dept: GENERAL RADIOLOGY | Facility: HOSPITAL | Age: 40
End: 2025-01-28
Payer: COMMERCIAL

## 2025-01-28 ENCOUNTER — OFFICE VISIT (OUTPATIENT)
Dept: NEUROLOGY | Facility: CLINIC | Age: 40
End: 2025-01-28
Payer: COMMERCIAL

## 2025-01-28 VITALS — DIASTOLIC BLOOD PRESSURE: 64 MMHG | HEART RATE: 80 BPM | OXYGEN SATURATION: 97 % | SYSTOLIC BLOOD PRESSURE: 96 MMHG

## 2025-01-28 DIAGNOSIS — G43.119 INTRACTABLE MIGRAINE WITH AURA WITHOUT STATUS MIGRAINOSUS: Primary | ICD-10-CM

## 2025-01-28 DIAGNOSIS — G56.21 ULNAR NEUROPATHY AT ELBOW, RIGHT: ICD-10-CM

## 2025-01-28 PROCEDURE — 99214 OFFICE O/P EST MOD 30 MIN: CPT | Performed by: PSYCHIATRY & NEUROLOGY

## 2025-01-28 NOTE — PROGRESS NOTES
Subjective:    CC: Regla West is in clinic today for follow up for migraines and tension headaches.    HPI:  Follow-up: 3/13/2024: She is in clinic for for regular follow-up.  She was scheduled to have trigger point injection but daughter tells me today that after the last couple of trigger point injection, she has not noticed significant relief in headache and migraine frequency.  In fact in the last 3 months, she has noticed further increase in migraines and she is now experiencing at least 3 migraine days in a week requiring Nurtec use.  She is also reporting that her sleep is not very good.  She is currently on trazodone 25 mg at bedtime.  She denies any side effects with trazodone use.  Trazodone helps some with sleep but she cannot consistently get 6 to 7 hours of sleep at night.    Follow-up: 7/11/2024: She is in clinic for regular follow-up.  Since her last visit in March 2024, she reports that she has now done 3 Ajovy injections and has had excellent response with her migraines reducing from 12 migraine days to 3-4 migraine days.  She is using Nurtec as needed as an abortive treatment and it is working well.  She reports that usually 1 week prior to her next injection, migraines are worse.  She does report injection site reaction with Ajovy.    Follow-up: 1/28/2025: She is in clinic for regular follow-up.  Since her last visit in July 2024, she continues to do Ajovy once a month injection and reports that migraines remain under excellent control with on an average 3-4 breakthrough migraines in a month.  She is taking Nurtec as needed as an abortive treatment and it does help.  Overall she is happy with this combination.  She does take trazodone 25 mg at bedtime and still has days when she does not sleep very good.  She does take melatonin 5 mg every day.  In addition to this, she is reporting tingling and numbness involving the right hand digits 4 and 5 that will wake her up in the middle of the  night.  She does report history of ulnar neuropathy on the left when she was pregnant but this time this is on the right side.    The following portions of the patient's history were reviewed and updated as of 01/28/2025: allergies, social history, and problem list.       Current Outpatient Medications:     Budesonide (ENTOCORT EC) 3 MG 24 hr capsule, Take 2 capsules by mouth Every Morning., Disp: , Rfl:     Calcium-Phosphorus-Vitamin D (CALCIUM/VITAMIN D3/ADULT GUMMY PO), Take  by mouth., Disp: , Rfl:     clindamycin (CLEOCIN T) 1 % external solution, Apply  topically to the appropriate area as directed 2 (Two) Times a Day., Disp: 60 mL, Rfl: 6    escitalopram (Lexapro) 10 MG tablet, Take 1 tablet by mouth Daily., Disp: 90 tablet, Rfl: 3    Fremanezumab-vfrm (Ajovy) 225 MG/1.5ML solution auto-injector, Inject 225 mg under the skin into the appropriate area as directed Every 30 (Thirty) Days., Disp: 1.5 mL, Rfl: 11    levothyroxine (SYNTHROID, LEVOTHROID) 88 MCG tablet, Take 1 tablet by mouth Daily., Disp: 90 tablet, Rfl: 3    loratadine (CLARITIN) 10 MG tablet, Take 1 tablet by mouth Daily., Disp: , Rfl:     melatonin 5 MG tablet tablet, Take 1 tablet by mouth Every Night., Disp: , Rfl:     Multiple Vitamins-Minerals (HAIR SKIN AND NAILS FORMULA PO), Take 1 tablet by mouth Daily., Disp: , Rfl:     rimegepant sulfate ODT (Nurtec) 75 MG tablet, Take 1 tablet by mouth As Needed. Take at onset of headache, Max of 75 mg/24 hours, Max of 18 tabs/30 days., Disp: 16 tablet, Rfl: 11    traZODone (DESYREL) 50 MG tablet, Take 1 tablet by mouth Every Night., Disp: 90 tablet, Rfl: 1    tretinoin (RETIN-A) 0.025 % cream, Apply 1 Application topically to the appropriate area as directed Every Night., Disp: 45 g, Rfl: 6   Past Medical History:   Diagnosis Date    Allergic     Anemia     Taking iron    Anxiety     GERD (gastroesophageal reflux disease)     Hashimoto's thyroiditis     Headache     Hypothyroidism     Irritable bowel  syndrome     Migraine     Pain in joint, lower leg       Past Surgical History:   Procedure Laterality Date     SECTION       SECTION WITH TUBAL Bilateral 2018    Procedure:  SECTION PRIMARY WITH TUBAL * 39 WKS *;  Surgeon: Cinda Greenberg MD;  Location: [facility] LABOR DELIVERY;  Service: Obstetrics/Gynecology    COLONOSCOPY      D & C HYSTEROSCOPY ENDOMETRIAL ABLATION DIAGNOSTIC LAPAROSCOPY  2023    OOPHORECTOMY Right 2023    SALPINGECTOMY Bilateral 2023    SEPTOPLASTY      2017    SINUS SURGERY      X 3    TUBAL ABDOMINAL LIGATION      UPPER GASTROINTESTINAL ENDOSCOPY      WISDOM TOOTH EXTRACTION        Family History   Problem Relation Age of Onset    Hyperlipidemia Mother     Anxiety disorder Mother     Arthritis Mother     Hypertension Father     Hyperlipidemia Father     Thyroid disease Father     Breast cancer Neg Hx     Ovarian cancer Neg Hx     Uterine cancer Neg Hx     Colon cancer Neg Hx     Colon polyps Neg Hx     Esophageal cancer Neg Hx         Review of Systems  Objective:    BP 96/64   Pulse 80   SpO2 97%     Neurology Exam:  General apperance: NAD.     Mental status: Alert, awake and oriented to time place and person.    Language and Speech: No aphasia or dysarthria.    CN II to XII: Intact.    Opthalmoscopic Exam: No papilledema.    Motor:  Right UE muscle strength 5/5. Normal tone.     Left UE muscle strength 5/5. Normal tone.      Right LE muscle strength 5/5. Normal tone.     Left LE muscle strength 5/5. Normal tone.      Sensory: Normal light touch, vibration and pinprick sensation bilaterally.    DTRs: 2+ bilaterally.    Babinski: Negative bilaterally.    Co-ordination: Normal finger-to-nose, heel to shin B/L.    Positive Tinel's sign at the elbow on the right.    Rhomberg: Negative.    Gait: Normal.    Cardiovascular: Regular rate and rhythm without murmur, gallop or rub.    Assessment and Plan:  1. Intractable migraine with aura without status  migrainosus  3. Ulnar neuropathy at elbow, right  She is responding well to Ajovy and migraines have reduced to 3-4 breakthrough migraines in a month.  Nurtec is working well as an abortive treatment.  She is reporting symptoms of ulnar neuropathy on the right.  I have advised her to start wearing elbow brace every day at night and if possible during daytime and see if it helps.  If it does not then I have advised her to call my office and alternative treatment such as physical therapy can be considered in future otherwise I will see her back in clinic in 6 months for follow-up.    I spent 30 minutes in patient care: Reviewing records prior to the visit, entering orders and documentation and spent more than easley 50% of this time face-to-face in management, instructions and education regarding above mentioned diagnosis and also on counseling and discussing about taking medication regularly, possible side effects with medication use, importance of good sleep hygiene, good hydration and regular exercise.    Return in about 6 months (around 7/28/2025).       Note to patient: The 21st Century Cures Act makes medical notes like these available to patients in the interest of transparency. However, be advised this is a medical document. It is intended as peer to peer communication. It is written in medical language and may contain abbreviations or verbiage that are unfamiliar. It may appear blunt or direct. Medical documents are intended to carry relevant information, facts as evident, and the clinical opinion of the physician.

## 2025-02-11 RX ORDER — BUPROPION HYDROCHLORIDE 150 MG/1
150 TABLET ORAL DAILY
Qty: 30 TABLET | Refills: 3 | Status: SHIPPED | OUTPATIENT
Start: 2025-02-11

## 2025-02-19 ENCOUNTER — SPECIALTY PHARMACY (OUTPATIENT)
Dept: NEUROLOGY | Facility: CLINIC | Age: 40
End: 2025-02-19
Payer: COMMERCIAL

## 2025-02-19 NOTE — PROGRESS NOTES
Specialty Pharmacy Patient Management Program  Refill Outreach     Regla was contacted today regarding refills of their medication(s).    Refill Questions      Flowsheet Row Most Recent Value   Changes to allergies? No   Changes to medications? No   New conditions or infections since last clinic visit No   Unplanned office visit, urgent care, ED, or hospital admission in the last 4 weeks  No   How does patient/caregiver feel medication is working? Good   Financial problems or insurance changes  No   Since the previous refill, were any specialty medication doses or scheduled injections missed or delayed?  No   Does this patient require a clinical escalation to a pharmacist? No            Delivery Questions      Flowsheet Row Most Recent Value   Delivery method UPS   Delivery address verified with patient/caregiver? Yes   Delivery address Home   Number of medications in delivery 2   Medication(s) being filled and delivered Fremanezumab-vfrm (Ajovy), Rimegepant Sulfate (Nurtec)   Doses left of specialty medications Ajovy 4, Nurtec 5   Copay verified? Yes   Copay amount $15   Copay form of payment Credit/debit on file   Delivery Date Selection 02/21/25   Signature Required No            Medication Adherence    Adherence tools used: directed education  Support network for adherence: family member          Follow-up: 28 day(s)     Johnathan Gallegos, Pharmacy Technician  2/19/2025  13:37 EST

## 2025-02-27 RX ORDER — BISMUTH SUBSALICYLATE 262 MG/1
524 TABLET, CHEWABLE ORAL 2 TIMES DAILY
Qty: 112 TABLET | Refills: 3 | Status: SHIPPED | OUTPATIENT
Start: 2025-02-27

## 2025-03-05 ENCOUNTER — TRANSCRIBE ORDERS (OUTPATIENT)
Dept: ADMINISTRATIVE | Facility: HOSPITAL | Age: 40
End: 2025-03-05
Payer: COMMERCIAL

## 2025-03-05 DIAGNOSIS — Z12.31 SCREENING MAMMOGRAM FOR BREAST CANCER: Primary | ICD-10-CM

## 2025-03-21 ENCOUNTER — TRANSCRIBE ORDERS (OUTPATIENT)
Facility: HOSPITAL | Age: 40
End: 2025-03-21
Payer: COMMERCIAL

## 2025-03-21 ENCOUNTER — LAB (OUTPATIENT)
Facility: HOSPITAL | Age: 40
End: 2025-03-21
Payer: COMMERCIAL

## 2025-03-21 DIAGNOSIS — K52.839 MICROSCOPIC COLITIS, UNSPECIFIED MICROSCOPIC COLITIS TYPE: ICD-10-CM

## 2025-03-21 DIAGNOSIS — R76.0 RAISED ANTIBODY TITER: Primary | ICD-10-CM

## 2025-03-21 DIAGNOSIS — R76.0 RAISED ANTIBODY TITER: ICD-10-CM

## 2025-03-21 DIAGNOSIS — D89.89 RADIATION CHIMERA: ICD-10-CM

## 2025-03-21 DIAGNOSIS — M13.0 POLYARTHROPATHY: ICD-10-CM

## 2025-03-21 LAB
CK SERPL-CCNC: 76 U/L (ref 20–180)
CRP SERPL-MCNC: <0.3 MG/DL (ref 0–0.5)
ERYTHROCYTE [SEDIMENTATION RATE] IN BLOOD: 5 MM/HR (ref 0–20)
URATE SERPL-MCNC: 4.3 MG/DL (ref 2.4–5.7)

## 2025-03-21 PROCEDURE — 86200 CCP ANTIBODY: CPT

## 2025-03-21 PROCEDURE — 86431 RHEUMATOID FACTOR QUANT: CPT

## 2025-03-21 PROCEDURE — 82550 ASSAY OF CK (CPK): CPT

## 2025-03-21 PROCEDURE — 36415 COLL VENOUS BLD VENIPUNCTURE: CPT

## 2025-03-21 PROCEDURE — 86015 ACTIN ANTIBODY EACH: CPT

## 2025-03-21 PROCEDURE — 84550 ASSAY OF BLOOD/URIC ACID: CPT

## 2025-03-21 PROCEDURE — 85652 RBC SED RATE AUTOMATED: CPT

## 2025-03-21 PROCEDURE — 86140 C-REACTIVE PROTEIN: CPT

## 2025-03-21 PROCEDURE — 86235 NUCLEAR ANTIGEN ANTIBODY: CPT

## 2025-03-24 LAB
CCP IGA+IGG SERPL IA-ACNC: 2 UNITS (ref 0–19)
RHEUMATOID FACT SERPL-ACNC: <10 IU/ML
SMA IGG SER-ACNC: 14 UNITS (ref 0–19)

## 2025-03-26 LAB — HISTONE IGG SER IA-ACNC: 0.5 UNITS (ref 0–0.9)

## 2025-03-27 ENCOUNTER — SPECIALTY PHARMACY (OUTPATIENT)
Dept: NEUROLOGY | Facility: CLINIC | Age: 40
End: 2025-03-27
Payer: COMMERCIAL

## 2025-03-27 NOTE — PROGRESS NOTES
Specialty Pharmacy Patient Management Program  Refill Outreach     Regla was contacted today regarding refills of their medication(s).    Refill Questions      Flowsheet Row Most Recent Value   Changes to allergies? No   Changes to medications? No   New conditions or infections since last clinic visit No   Unplanned office visit, urgent care, ED, or hospital admission in the last 4 weeks  No   How does patient/caregiver feel medication is working? Good   Financial problems or insurance changes  No   Since the previous refill, were any specialty medication doses or scheduled injections missed or delayed?  No   Does this patient require a clinical escalation to a pharmacist? No            Delivery Questions      Flowsheet Row Most Recent Value   Delivery method UPS   Delivery address verified with patient/caregiver? Yes   Delivery address Home   Number of medications in delivery 2   Medication(s) being filled and delivered Fremanezumab-vfrm (Ajovy), Rimegepant Sulfate (Nurtec)   Doses left of specialty medications Nurtec 4, Ajovy 5   Copay verified? Yes   Copay amount $15   Copay form of payment Credit/debit on file   Delivery Date Selection 03/28/25   Signature Required No            Medication Adherence    Adherence tools used: directed education  Support network for adherence: family member          Follow-up: 28 day(s)     Johnathan Gallegos, Pharmacy Technician  3/27/2025  10:12 EDT

## 2025-04-05 LAB
ANA SER QL IF: POSITIVE
ANA SPECKLED TITR SER: ABNORMAL {TITER}
B2 GLYCOPROT1 IGA SER-ACNC: <21 SGU
B2 GLYCOPROT1 IGM SER-ACNC: <33 SMU
C3 SERPL-MCNC: 143 MG/DL (ref 90–180)
C4 SERPL-MCNC: 26 MG/DL (ref 10–40)
CARDIOLIPIN IGG SER IA-ACNC: <15 GPL U/ML
CARDIOLIPIN IGM SER IA-ACNC: <13 MPL U/ML
CENTROMERE AB TITR SER IF: ABNORMAL {TITER}
CHROMATIN IGG SERPL-ACNC: <20 UNITS
DSDNA AB SER FARR-ACNC: <8 IU/ML
ENA JO1 AB SER IA-ACNC: <20 UNITS
ENA SCL70 AB SER IA-ACNC: <20 UNITS
ENA SM AB SER-ACNC: <20 UNITS
ENA SS-A AB SER IA-ACNC: <20 UNITS
ENA SS-B AB SER IA-ACNC: <20 UNITS
IMMUNOLOGIST REVIEW: ABNORMAL
LABORATORY COMMENT REPORT: ABNORMAL
Lab: ABNORMAL
U1 SNRNP AB SER IA-ACNC: <20 UNITS

## 2025-04-24 LAB
NCCN CRITERIA FLAG: NORMAL
TYRER CUZICK SCORE: 11

## 2025-04-25 ENCOUNTER — HOSPITAL ENCOUNTER (OUTPATIENT)
Facility: HOSPITAL | Age: 40
Discharge: HOME | End: 2025-04-25
Admitting: PHYSICIAN ASSISTANT
Payer: COMMERCIAL

## 2025-04-25 ENCOUNTER — SPECIALTY PHARMACY (OUTPATIENT)
Dept: NEUROLOGY | Facility: CLINIC | Age: 40
End: 2025-04-25
Payer: COMMERCIAL

## 2025-04-25 DIAGNOSIS — Z12.31 SCREENING MAMMOGRAM FOR BREAST CANCER: ICD-10-CM

## 2025-04-25 PROCEDURE — 77067 SCR MAMMO BI INCL CAD: CPT

## 2025-04-25 PROCEDURE — 77063 BREAST TOMOSYNTHESIS BI: CPT

## 2025-04-25 NOTE — PROGRESS NOTES
Specialty Pharmacy Patient Management Program  Refill Outreach     Regla was contacted today regarding refills of their medication(s).    Refill Questions      Flowsheet Row Most Recent Value   Changes to allergies? No   Changes to medications? No   New conditions or infections since last clinic visit No   Unplanned office visit, urgent care, ED, or hospital admission in the last 4 weeks  No   How does patient/caregiver feel medication is working? Good   Financial problems or insurance changes  No   Since the previous refill, were any specialty medication doses or scheduled injections missed or delayed?  No   Does this patient require a clinical escalation to a pharmacist? No            Delivery Questions      Flowsheet Row Most Recent Value   Delivery method UPS   Delivery address verified with patient/caregiver? Yes   Delivery address Home   Number of medications in delivery 2   Medication(s) being filled and delivered Fremanezumab-vfrm (Ajovy), Rimegepant Sulfate (Nurtec)   Doses left of specialty medications Nurtec 4, Ajovy 5   Copay verified? Yes   Copay amount $15   Copay form of payment Credit/debit on file   Delivery Date Selection 04/29/25   Signature Required No            Medication Adherence    Adherence tools used: directed education  Support network for adherence: family member          Follow-up: 28 day(s)     Johnathan Gallegos, Pharmacy Technician  4/25/2025  08:10 EDT

## 2025-05-21 RX ORDER — CLINDAMYCIN PHOSPHATE 11.9 MG/ML
SOLUTION TOPICAL 2 TIMES DAILY
Qty: 60 ML | Refills: 6 | Status: SHIPPED | OUTPATIENT
Start: 2025-05-21

## 2025-05-22 ENCOUNTER — SPECIALTY PHARMACY (OUTPATIENT)
Dept: NEUROLOGY | Facility: CLINIC | Age: 40
End: 2025-05-22
Payer: COMMERCIAL

## 2025-05-22 ENCOUNTER — SPECIALTY PHARMACY (OUTPATIENT)
Dept: GENERAL RADIOLOGY | Facility: HOSPITAL | Age: 40
End: 2025-05-22
Payer: COMMERCIAL

## 2025-05-22 RX ORDER — FREMANEZUMAB-VFRM 225 MG/1.5ML
225 INJECTION SUBCUTANEOUS
Qty: 1.5 ML | Refills: 8 | Status: SHIPPED | OUTPATIENT
Start: 2025-05-22

## 2025-05-22 NOTE — PROGRESS NOTES
Specialty Pharmacy Patient Management Program  Refill Outreach     Regla was contacted today regarding refills of their medication(s).    Refill Questions      Flowsheet Row Most Recent Value   Changes to allergies? No   Changes to medications? No   New conditions or infections since last clinic visit No   Unplanned office visit, urgent care, ED, or hospital admission in the last 4 weeks  No   How does patient/caregiver feel medication is working? Good   Financial problems or insurance changes  No   If yes, describe changes in insurance or financial issues. n/a   Since the previous refill, were any specialty medication doses or scheduled injections missed or delayed?  No   If yes, please provide the amount n/a   Why were doses missed? n/a   Does this patient require a clinical escalation to a pharmacist? No            Delivery Questions      Flowsheet Row Most Recent Value   Delivery method UPS   Delivery address verified with patient/caregiver? Yes   Delivery address Home   Other address preferred n/a   Number of medications in delivery 2   Medication(s) being filled and delivered Fremanezumab-vfrm (Ajovy), Rimegepant Sulfate (Nurtec)   Doses left of specialty medications Nurtec has 6 tablets left.   Copay verified? Yes   Copay amount Ajovy = $15.00 and Nurtec = $0   Copay form of payment Credit/debit on file   Delivery Date Selection 05/23/25   Signature Required No   Do you consent to receive electronic handouts?  Yes            Medication Adherence    Adherence tools used: directed education  Support network for adherence: family member          Follow-up: 30 day(s)     Raul Dinh  5/22/2025  11:44 EDT

## 2025-05-22 NOTE — TELEPHONE ENCOUNTER
Rx Refill Note  Requested Prescriptions     Pending Prescriptions Disp Refills    traZODone (DESYREL) 50 MG tablet 90 tablet 1     Sig: Take 1 tablet by mouth Every Night.      Last filled:07/11/2024 90 days, 1 refill   Last office visit with prescribing clinician: 1/28/2025      Next office visit with prescribing clinician: 7/31/2025     Jessica Price RN  05/22/25, 10:55 EDT    Waiting to see if she is still taking nightly.

## 2025-05-23 RX ORDER — TRAZODONE HYDROCHLORIDE 50 MG/1
50 TABLET ORAL NIGHTLY
Qty: 90 TABLET | Refills: 1 | Status: SHIPPED | OUTPATIENT
Start: 2025-05-23

## 2025-05-30 ENCOUNTER — TELEPHONE (OUTPATIENT)
Dept: NEUROLOGY | Facility: CLINIC | Age: 40
End: 2025-05-30
Payer: COMMERCIAL

## 2025-06-04 ENCOUNTER — OFFICE VISIT (OUTPATIENT)
Dept: GASTROENTEROLOGY | Facility: CLINIC | Age: 40
End: 2025-06-04
Payer: COMMERCIAL

## 2025-06-04 VITALS
RESPIRATION RATE: 24 BRPM | WEIGHT: 112.2 LBS | HEIGHT: 63 IN | OXYGEN SATURATION: 100 % | TEMPERATURE: 98.6 F | HEART RATE: 112 BPM | BODY MASS INDEX: 19.88 KG/M2

## 2025-06-04 DIAGNOSIS — K52.839 MICROSCOPIC COLITIS, UNSPECIFIED MICROSCOPIC COLITIS TYPE: ICD-10-CM

## 2025-06-04 DIAGNOSIS — K21.9 GASTROESOPHAGEAL REFLUX DISEASE, UNSPECIFIED WHETHER ESOPHAGITIS PRESENT: Primary | ICD-10-CM

## 2025-06-04 PROCEDURE — 99214 OFFICE O/P EST MOD 30 MIN: CPT | Performed by: INTERNAL MEDICINE

## 2025-06-04 NOTE — PROGRESS NOTES
PCP: Kvng Cole PA    No chief complaint on file.      History of Present Illness:   Regla West is a 39 y.o. female who presents to GI clinic as a follow up for microscopic colitis and constipation. Has gone off budesonide for a few weeks and did experience urgent diarrhea, particularly at night. Seeing dermatology and rheumatology for joint aches and rash. Rash appears on flexor surfaces and was worse in winter.  Responded to steroids. Duodenal path from  without celiac.     Past Medical History:   Diagnosis Date    Allergic     Anemia     Taking iron    Anxiety     GERD (gastroesophageal reflux disease)     Hashimoto's thyroiditis     Headache     Hypothyroidism     Irritable bowel syndrome     Migraine     Pain in joint, lower leg        Past Surgical History:   Procedure Laterality Date     SECTION       SECTION WITH TUBAL Bilateral 2018    Procedure:  SECTION PRIMARY WITH TUBAL * 39 WKS *;  Surgeon: Cinda Greenberg MD;  Location: [facility] LABOR DELIVERY;  Service: Obstetrics/Gynecology    COLONOSCOPY      D & C HYSTEROSCOPY ENDOMETRIAL ABLATION DIAGNOSTIC LAPAROSCOPY  2023    OOPHORECTOMY Right 2023    SALPINGECTOMY Bilateral 2023    SEPTOPLASTY      2017    SINUS SURGERY      X 3    TUBAL ABDOMINAL LIGATION      UPPER GASTROINTESTINAL ENDOSCOPY      WISDOM TOOTH EXTRACTION           Current Outpatient Medications:     bismuth subsalicylate (PEPTO BISMOL) 262 MG chewable tablet, Chew 2 tablets by mouth 2 (Two) Times a Day., Disp: 112 tablet, Rfl: 3    Budesonide (ENTOCORT EC) 3 MG 24 hr capsule, Take 2 capsules by mouth Every Morning., Disp: , Rfl:     buPROPion XL (Wellbutrin XL) 150 MG 24 hr tablet, Take 1 tablet by mouth Daily., Disp: 30 tablet, Rfl: 3    Calcium-Phosphorus-Vitamin D (CALCIUM/VITAMIN D3/ADULT GUMMY PO), Take  by mouth., Disp: , Rfl:     clindamycin (CLEOCIN T) 1 % external solution, Apply  topically to the appropriate area as  directed 2 (Two) Times a Day., Disp: 60 mL, Rfl: 6    escitalopram (Lexapro) 10 MG tablet, Take 1 tablet by mouth Daily., Disp: 90 tablet, Rfl: 3    Fremanezumab-vfrm (Ajovy) 225 MG/1.5ML solution auto-injector, Inject 225 mg under the skin into the appropriate area as directed Every 30 (Thirty) Days., Disp: 1.5 mL, Rfl: 8    levothyroxine (SYNTHROID, LEVOTHROID) 88 MCG tablet, Take 1 tablet by mouth Daily., Disp: 90 tablet, Rfl: 3    Lifitegrast (Xiidra) 5 % ophthalmic solution, Administer 1 drop into both eyes 2 (Two) Times a Day., Disp: 180 each, Rfl: 2    loratadine (CLARITIN) 10 MG tablet, Take 1 tablet by mouth Daily., Disp: , Rfl:     melatonin 5 MG tablet tablet, Take 1 tablet by mouth Every Night., Disp: , Rfl:     Multiple Vitamins-Minerals (HAIR SKIN AND NAILS FORMULA PO), Take 1 tablet by mouth Daily., Disp: , Rfl:     predniSONE (DELTASONE) 10 MG tablet, Take 1 tablet by mouth Daily AS NEEDED for 2-5 days for a flare up; may repeat once a week., Disp: 30 tablet, Rfl: 1    rimegepant sulfate ODT (Nurtec) 75 MG disintegrating tablet, Take 1 tablet by mouth As Needed. Take at onset of headache, Max of 75 mg/24 hours, Max of 18 tabs/30 days., Disp: 16 tablet, Rfl: 11    traZODone (DESYREL) 50 MG tablet, Take 1 tablet by mouth Every Night., Disp: 90 tablet, Rfl: 1    tretinoin (RETIN-A) 0.025 % cream, Apply 1 Application topically to the appropriate area as directed Every Night., Disp: 45 g, Rfl: 6    Allergies   Allergen Reactions    Macrobid [Nitrofurantoin Monohyd Macro] Rash    Omnicef [Cefdinir] Nausea And Vomiting       Family History   Problem Relation Age of Onset    Hyperlipidemia Mother     Anxiety disorder Mother     Arthritis Mother     Hypertension Father     Hyperlipidemia Father     Thyroid disease Father     Breast cancer Neg Hx     Ovarian cancer Neg Hx     Uterine cancer Neg Hx     Colon cancer Neg Hx     Colon polyps Neg Hx     Esophageal cancer Neg Hx        Social History      Socioeconomic History    Marital status:    Tobacco Use    Smoking status: Never     Passive exposure: Past    Smokeless tobacco: Never   Vaping Use    Vaping status: Never Used   Substance and Sexual Activity    Alcohol use: Yes     Alcohol/week: 1.0 standard drink of alcohol     Types: 1 Glasses of wine per week     Comment: socially    Drug use: No    Sexual activity: Yes     Partners: Male     Birth control/protection: Surgical       Review of Systems    A complete 12 point ros was asked and is negative except for that mentioned above.  In particular:  No fever  No rash  No increased arthralgias  No worsening edema  No cough  No dyspnea  No chest pain    There were no vitals filed for this visit.    Physical Exam  General: well developed, well nourished  A+O x 3 NAD  HEENT: NCAT, pupils equal appearing, sclera appear white  NECK: full ROM  Respiratory: symmetric chest rise, normal effort, normal work of breathing, no overt rales  Abdomen: non-distended  Skin: normal color, no jaundice  Neuro: no tremor, no facial droop  Psych: normal mood and affect      Assessment/Plan  ast colonoscopy: 4/2024  Fair to adequate prep, normal TI, 5 mm polyp, biopsies of normal colon showed mild nonspecific colitis; semisolid stool in flexure points    Egd: 2021: no overt celiac disease    1.) Microscopic colitis, unspecified  She had a tortuous colon which makes me think of IBS but her random colon biopsies do show a colitis, acute rather than chronic.    Plan:  Continue lowest effective dose of budesonide. She will consider going to qod dosing with prn constipation regimen rather than experience urgent liquid stool.  Should rheumatology decide to treat joint aches, sulfasalazine with folic acid may be a better option given some colon coverage and might allow weaning off of budesonide.      2.) Constipation  ? Medicine induced  Continue magnesium    3.) GERD  Egd: 2021, small hiatal hernia, lax les  She has not been  taking ppi daily due to possible associated with microscopic colitis.  Plan:  - pepcid daily  - consider relook egd if symptoms persist    4.) Pruritic scaly rash (not present at time of exam), history of, flexure surface  5.) Arthralgias  ? Autoimmune, psoriatic arthritis vs other. Following with derm and rheumatology.     Hiro Nguyen MD  6/4/2025

## 2025-06-19 ENCOUNTER — SPECIALTY PHARMACY (OUTPATIENT)
Dept: NEUROLOGY | Facility: CLINIC | Age: 40
End: 2025-06-19
Payer: COMMERCIAL

## 2025-06-19 NOTE — PROGRESS NOTES
Specialty Pharmacy Patient Management Program  Refill Outreach     Regla was contacted today regarding refills of their medication(s).    Refill Questions      Flowsheet Row Most Recent Value   Changes to allergies? No   Changes to medications? No   New conditions or infections since last clinic visit No   Unplanned office visit, urgent care, ED, or hospital admission in the last 4 weeks  No   How does patient/caregiver feel medication is working? Good   Financial problems or insurance changes  No   If yes, describe changes in insurance or financial issues. n/a   Since the previous refill, were any specialty medication doses or scheduled injections missed or delayed?  No   If yes, please provide the amount n/a   Why were doses missed? n/a   Does this patient require a clinical escalation to a pharmacist? No            Delivery Questions      Flowsheet Row Most Recent Value   Delivery method UPS   Delivery address verified with patient/caregiver? Yes   Delivery address Home   Other address preferred n/a   Number of medications in delivery 2   Medication(s) being filled and delivered Rimegepant Sulfate (Nurtec), Fremanezumab-vfrm (Ajovy)   Doses left of specialty medications Nurtec has 3 tablets left.   Copay verified? Yes   Copay amount Ajovy = $15.00 and Nurtec = $0   Copay form of payment Credit/debit on file   Delivery Date Selection 06/20/25   Signature Required No   Do you consent to receive electronic handouts?  Yes            Medication Adherence    Adherence tools used: directed education  Support network for adherence: family member          Follow-up: 30 day(s)     Raul Dinh  6/19/2025  10:10 EDT

## 2025-06-27 ENCOUNTER — TRANSCRIBE ORDERS (OUTPATIENT)
Facility: HOSPITAL | Age: 40
End: 2025-06-27
Payer: COMMERCIAL

## 2025-06-27 ENCOUNTER — LAB (OUTPATIENT)
Facility: HOSPITAL | Age: 40
End: 2025-06-27
Payer: COMMERCIAL

## 2025-06-27 DIAGNOSIS — H04.123 DRY EYES: ICD-10-CM

## 2025-06-27 DIAGNOSIS — M13.0 POLYARTHROPATHY: ICD-10-CM

## 2025-06-27 DIAGNOSIS — M13.0 POLYARTHROPATHY: Primary | ICD-10-CM

## 2025-06-27 LAB
C3 SERPL-MCNC: 123 MG/DL (ref 82–167)
C4 SERPL-MCNC: 24 MG/DL (ref 14–44)
CHROMATIN AB SERPL-ACNC: <10 IU/ML (ref 0–14)
CRP SERPL-MCNC: <0.3 MG/DL (ref 0–0.5)
MAGNESIUM SERPL-MCNC: 1.7 MG/DL (ref 1.6–2.6)

## 2025-06-27 PROCEDURE — 36415 COLL VENOUS BLD VENIPUNCTURE: CPT

## 2025-06-27 PROCEDURE — 86235 NUCLEAR ANTIGEN ANTIBODY: CPT

## 2025-06-27 PROCEDURE — 83735 ASSAY OF MAGNESIUM: CPT

## 2025-06-27 PROCEDURE — 86160 COMPLEMENT ANTIGEN: CPT

## 2025-06-27 PROCEDURE — 86200 CCP ANTIBODY: CPT

## 2025-06-27 PROCEDURE — 86225 DNA ANTIBODY NATIVE: CPT

## 2025-06-27 PROCEDURE — 81374 HLA I TYPING 1 ANTIGEN LR: CPT

## 2025-06-27 PROCEDURE — 84155 ASSAY OF PROTEIN SERUM: CPT

## 2025-06-27 PROCEDURE — 82652 VIT D 1 25-DIHYDROXY: CPT

## 2025-06-27 PROCEDURE — 86431 RHEUMATOID FACTOR QUANT: CPT

## 2025-06-27 PROCEDURE — 86140 C-REACTIVE PROTEIN: CPT

## 2025-06-27 PROCEDURE — 86038 ANTINUCLEAR ANTIBODIES: CPT

## 2025-06-27 PROCEDURE — 84165 PROTEIN E-PHORESIS SERUM: CPT

## 2025-06-27 RX ORDER — BUPROPION HYDROCHLORIDE 150 MG/1
150 TABLET ORAL DAILY
Qty: 30 TABLET | Refills: 3 | Status: SHIPPED | OUTPATIENT
Start: 2025-06-27

## 2025-06-28 LAB — CCP IGA+IGG SERPL IA-ACNC: 7 UNITS (ref 0–19)

## 2025-06-30 LAB
1,25(OH)2D SERPL-MCNC: 42.8 PG/ML (ref 24.8–81.5)
ALBUMIN SERPL ELPH-MCNC: 3.6 G/DL (ref 2.9–4.4)
ALBUMIN/GLOB SERPL: 1.2 {RATIO} (ref 0.7–1.7)
ALPHA1 GLOB SERPL ELPH-MCNC: 0.3 G/DL (ref 0–0.4)
ALPHA2 GLOB SERPL ELPH-MCNC: 0.7 G/DL (ref 0.4–1)
ANA SER QL: NEGATIVE
B-GLOBULIN SERPL ELPH-MCNC: 1 G/DL (ref 0.7–1.3)
DSDNA AB SER-ACNC: 1 IU/ML (ref 0–9)
ENA SS-A AB SER-ACNC: <0.2 AI (ref 0–0.9)
ENA SS-B AB SER-ACNC: <0.2 AI (ref 0–0.9)
GAMMA GLOB SERPL ELPH-MCNC: 1.1 G/DL (ref 0.4–1.8)
GLOBULIN SER CALC-MCNC: 3 G/DL (ref 2.2–3.9)
LABORATORY COMMENT REPORT: NORMAL
M PROTEIN SERPL ELPH-MCNC: NORMAL G/DL
PROT PATTERN SERPL ELPH-IMP: NORMAL
PROT SERPL-MCNC: 6.6 G/DL (ref 6–8.5)

## 2025-07-07 LAB — HLA-B27 QL NAA+PROBE: NEGATIVE

## 2025-07-14 ENCOUNTER — SPECIALTY PHARMACY (OUTPATIENT)
Dept: NEUROLOGY | Facility: CLINIC | Age: 40
End: 2025-07-14
Payer: COMMERCIAL

## 2025-07-17 ENCOUNTER — SPECIALTY PHARMACY (OUTPATIENT)
Dept: NEUROLOGY | Facility: CLINIC | Age: 40
End: 2025-07-17
Payer: COMMERCIAL

## 2025-07-17 ENCOUNTER — SPECIALTY PHARMACY (OUTPATIENT)
Dept: GENERAL RADIOLOGY | Facility: HOSPITAL | Age: 40
End: 2025-07-17
Payer: COMMERCIAL

## 2025-07-17 NOTE — PROGRESS NOTES
Specialty Pharmacy Patient Management Program  Refill Outreach     Regla was contacted today regarding refills of their medication(s).    Refill Questions      Flowsheet Row Most Recent Value   Changes to allergies? No   Changes to medications? No   New conditions or infections since last clinic visit No   Unplanned office visit, urgent care, ED, or hospital admission in the last 4 weeks  No   How does patient/caregiver feel medication is working? Good   Financial problems or insurance changes  No   Since the previous refill, were any specialty medication doses or scheduled injections missed or delayed?  No   Does this patient require a clinical escalation to a pharmacist? No            Delivery Questions      Flowsheet Row Most Recent Value   Delivery method UPS   Delivery address verified with patient/caregiver? Yes   Delivery address Home   Number of medications in delivery 2   Medication(s) being filled and delivered Fremanezumab-vfrm (Ajovy), Rimegepant Sulfate (NURTEC-ODT)   Doses left of specialty medications Ajovy 3, Nurtec 5   Copay verified? Yes   Copay amount $15   Copay form of payment Credit/debit on file   Delivery Date Selection 07/18/25   Signature Required No            Medication Adherence    Adherence tools used: directed education  Support network for adherence: family member          Follow-up: 28 day(s)     Johnathan Gallegos, Pharmacy Technician  7/17/2025  09:46 EDT

## 2025-07-17 NOTE — PROGRESS NOTES
Specialty Pharmacy Patient Management Program  Neurology Reassessment     Regla West is a 39 y.o. female with migraines seen by a Neurology provider and enrolled in the Neurology Patient Management program offered by [facility].  A follow-up outreach was conducted, including assessment of continued therapy appropriateness, medication adherence, and side effect incidence and management for Ajovy and Nurtec.     Changes to Insurance Coverage or Financial Support  No changes    Affirmed Rx  Ajovy and Nurtec Copay Cards     Relevant Past Medical History and Comorbidities  Relevant medical history and concomitant health conditions were discussed with the patient. The patient's chart has been reviewed for relevant past medical history and comorbid health conditions and updated as necessary.   Past Medical History:   Diagnosis Date    Allergic     Anemia     Taking iron    Anxiety     GERD (gastroesophageal reflux disease)     Hashimoto's thyroiditis     Headache     Hypothyroidism     Irritable bowel syndrome     Migraine     Pain in joint, lower leg      Social History     Socioeconomic History    Marital status:    Tobacco Use    Smoking status: Never     Passive exposure: Past    Smokeless tobacco: Never   Vaping Use    Vaping status: Never Used   Substance and Sexual Activity    Alcohol use: Yes     Alcohol/week: 1.0 standard drink of alcohol     Types: 1 Glasses of wine per week     Comment: socially    Drug use: No    Sexual activity: Yes     Partners: Male     Birth control/protection: Surgical     Problem list reviewed by Terri Coyne [facility] on 7/17/2025 at 10:00 AM    Hospitalizations and Urgent Care Since Last Assessment  ED Visits, Admissions, or Hospitalizations: None   Urgent Office Visits: None     Allergies  Known allergies and reactions were discussed with the patient. The patient's chart has been reviewed for allergy information and updated as necessary.    Allergies   Allergen Reactions    Macrobid [Nitrofurantoin Monohyd Macro] Rash    Omnicef [Cefdinir] Nausea And Vomiting     Allergies reviewed by Terri Coyne [facility] on 7/17/2025 at 10:00 AM    Relevant Laboratory Values  Common labs          9/20/2024    10:42 3/21/2025    12:24 6/27/2025    11:58   Common Labs   Glucose 75      BUN 10      Creatinine 0.84      Sodium 140      Potassium 4.3      Chloride 102      Calcium 9.6      Albumin 4.5   3.6    Total Bilirubin 0.3      Alkaline Phosphatase 49      AST (SGOT) 20      ALT (SGPT) 14      WBC 4.46      Hemoglobin 14.0      Hematocrit 42.5      Platelets 198      Total Cholesterol 177      Triglycerides 81      HDL Cholesterol 61      LDL Cholesterol  101      Uric Acid  4.3         Current Medication List  This medication list has been reviewed with the patient and evaluated for any interactions or necessary modifications/recommendations, and updated to include all prescription medications, OTC medications, and supplements the patient is currently taking.  This list reflects what is contained in the patient's profile, which has also been marked as reviewed to communicate to other providers it is the most up to date version of the patient's current medication therapy.     Current Outpatient Medications:     bismuth subsalicylate (PEPTO BISMOL) 262 MG chewable tablet, Chew 2 tablets by mouth 2 (Two) Times a Day., Disp: 112 tablet, Rfl: 3    Budesonide (ENTOCORT EC) 3 MG 24 hr capsule, Take 2 capsules by mouth Every Morning., Disp: , Rfl:     buPROPion XL (Wellbutrin XL) 150 MG 24 hr tablet, Take 1 tablet by mouth Daily., Disp: 30 tablet, Rfl: 3    Calcium-Phosphorus-Vitamin D (CALCIUM/VITAMIN D3/ADULT GUMMY PO), Take  by mouth., Disp: , Rfl:     clindamycin (CLEOCIN T) 1 % external solution, Apply  topically to the appropriate area as directed 2 (Two) Times a Day., Disp: 60 mL, Rfl: 6    clindamycin (CLEOCIN T) 1 % external solution, Apply 1 Application  topically to the appropriate area as directed 2 (Two) Times a Day As Needed., Disp: 60 mL, Rfl: 11    escitalopram (Lexapro) 10 MG tablet, Take 1 tablet by mouth Daily., Disp: 90 tablet, Rfl: 3    Fremanezumab-vfrm (Ajovy) 225 MG/1.5ML solution auto-injector, Inject 225 mg under the skin into the appropriate area as directed Every 30 (Thirty) Days., Disp: 1.5 mL, Rfl: 11    levothyroxine (SYNTHROID, LEVOTHROID) 88 MCG tablet, Take 1 tablet by mouth Daily., Disp: 90 tablet, Rfl: 3    Lifitegrast (Xiidra) 5 % ophthalmic solution, Administer 1 drop into both eyes 2 (Two) Times a Day., Disp: 180 each, Rfl: 2    loratadine (CLARITIN) 10 MG tablet, Take 1 tablet by mouth Daily., Disp: , Rfl:     melatonin 5 MG tablet tablet, Take 1 tablet by mouth Every Night., Disp: , Rfl:     mometasone (ELOCON) 0.1 % cream, Apply 1 Application topically to the appropriate area as directed up to 2 (Two) Times a Day As Needed., Disp: 45 g, Rfl: 10    Multiple Vitamins-Minerals (HAIR SKIN AND NAILS FORMULA PO), Take 1 tablet by mouth Daily., Disp: , Rfl:     predniSONE (DELTASONE) 10 MG tablet, Take 1 tablet by mouth Daily AS NEEDED for 2-5 days for a flare up; may repeat once a week., Disp: 30 tablet, Rfl: 1    rimegepant sulfate ODT (Nurtec) 75 MG disintegrating tablet, Take 1 tablet by mouth As Needed. Take at onset of headache, Max of 75 mg/24 hours, Max of 18 tabs/30 days., Disp: 16 tablet, Rfl: 11    traZODone (DESYREL) 50 MG tablet, Take 1 tablet by mouth Every Night., Disp: 90 tablet, Rfl: 1    tretinoin (RETIN-A) 0.025 % cream, Apply 1 Application topically to the appropriate area as directed Every Night., Disp: 45 g, Rfl: 6    tretinoin (RETIN-A) 0.025 % cream, Apply 1 Application topically to the appropriate area as directed at bedtime. Ease into use., Disp: 45 g, Rfl: 10    Medicines reviewed by Terri Coyne RP on 7/17/2025 at 10:00 AM    Drug Interactions  None     Adverse Drug Reactions  Medication tolerability:  Tolerating with no to minimal ADRs          Medication plan: Continue therapy with normal follow-up  Plan for ADR Management: Not required      Adherence, Self-Administration, and Current Therapy Problems  Adherence related patient's specialty therapy was discussed with the patient. The Adherence segment of this outreach has been reviewed and updated.   Adherence Questions  Linked Medication(s) Assessed: Rimegepant Sulfate (NURTEC-ODT), Fremanezumab-vfrm (Ajovy)  On average, how many doses/injections does the patient miss per month?: 0  What are the identified reasons for non-adherence or missed doses? : no problems identified  What is the estimated medication adherence level?: % (Nurtec - PRN)  Based on the patient/caregiver response and refill history, does this patient require an MTP to track adherence improvements?: no    Additional Barriers to Patient Self-Administration: None  Methods for Supporting Patient Self-Administration: Not required    Recently Close Medication Therapy Problems  No medication therapy recommendations to display  Open Medication Therapy Problems  No medication therapy recommendations to display     Goals of Therapy  Goals related to the patient's specialty therapy was discussed with the patient. The Patient Goals segment of this outreach has been reviewed and updated.    Goals Addressed Today        Specialty Pharmacy General Goal      Decrease frequency, severity and duration of migraine attacks from baseline (10-12 per month)               Quality of Life Assessment   Quality of Life related to the patient's enrollment in the patient management program and services provided was discussed with the patient. The QOL segment of this outreach has been reviewed and updated.   Quality of Life Improvement Scale: 9-A good deal better    Reassessment Plan & Follow-Up  Medication Therapy Changes: Continue Ajovy 225 mg subcutaneously monthly and Nurtec 75 mg PO once daily as needed for  migraines. - Take 1 tablet at the onset of headache, Max of 75 mg/24 hours.  Related Plans, Therapy Recommendations, or Issues to Be Addressed: None  Pharmacist to perform regular reassessments no more than (6) months from the previous assessment.  Care Coordinator to set up future refill outreaches, coordinate prescription delivery, and escalate clinical questions to pharmacist.     Attestation  Therapeutic appropriateness: Appropriate  I attest the patient was actively involved in and has agreed to the above plan of care. If the prescribed therapy is at any point deemed not appropriate based on the current or future assessments, a consultation will be initiated with the patient's specialty care provider to determine the best course of action. The revised plan of therapy will be documented along with any additional patient education provided. Discussed aforementioned material with patient by phone.    Terri Coyne, PharmD, ADRIÁN  Clinic Specialty Pharmacist, Neurology  7/17/2025  10:00 EDT

## 2025-07-22 DIAGNOSIS — M79.644 BILATERAL THUMB PAIN: Primary | ICD-10-CM

## 2025-07-22 DIAGNOSIS — M79.645 BILATERAL THUMB PAIN: Primary | ICD-10-CM

## 2025-07-31 ENCOUNTER — OFFICE VISIT (OUTPATIENT)
Age: 40
End: 2025-07-31
Payer: COMMERCIAL

## 2025-07-31 VITALS
WEIGHT: 106.7 LBS | DIASTOLIC BLOOD PRESSURE: 60 MMHG | HEIGHT: 63 IN | BODY MASS INDEX: 18.91 KG/M2 | SYSTOLIC BLOOD PRESSURE: 102 MMHG

## 2025-07-31 DIAGNOSIS — M79.641 RIGHT HAND PAIN: ICD-10-CM

## 2025-07-31 DIAGNOSIS — G56.21 CUBITAL TUNNEL SYNDROME ON RIGHT: Primary | ICD-10-CM

## 2025-07-31 DIAGNOSIS — M77.11 LATERAL EPICONDYLITIS OF RIGHT ELBOW: ICD-10-CM

## 2025-07-31 NOTE — PROGRESS NOTES
"                                                               [facility]     Office Visit       Date: 07/31/2025   Patient Name: Regla West  MRN: [id number]  YOB: 1985    Referring Physician: Kvng Cole,*     Chief Complaint:   Chief Complaint   Patient presents with    Right Hand - Pain     Thumb    Right Wrist - Pain       History of Present Illness:   Regla West is a 39 y.o. female Dominant presents for evaluation of right elbow hand and wrist pain of 6 months duration.  She has a longstanding history of right medial elbow pain that radiates distally into her small and ring finger.  Reports it is worsened over the last month.  She reports numbness and tingling that radiates distally into her small and ring finger that is worse with flexion at the elbow.  She has tried nighttime brace and anti-inflammatories with minimal improvement in her pain.  She also reports right basilar thumb pain is worse with work full use and activity.  Reports symptoms improved with rest.  She denies inciting trauma.  She also reports occasional right lateral elbow pain is worse with activity.  She is otherwise healthy.  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  She denies smoking.       Subjective   Review of Systems:   Review of Systems   Musculoskeletal:  Positive for arthralgias.   All other systems reviewed and are negative.       Pertinent review of systems per HPI.     I reviewed the patient's chief complaint, history of present illness, review of systems, past medical history, surgical history, family history, social history, medications and allergy list in the EMR on 07/31/2025 and agree with the findings above.    Objective    Vital Signs:   Vitals:    07/31/25 0934   BP: 102/60   Weight: 48.4 kg (106 lb 11.2 oz)   Height: 160 cm (62.99\")     BMI: Body mass index is 18.91 kg/m².    General Appearance: No acute distress. Alert and oriented.     Chest:  Non-labored " breathing on room air. Regular rate and rhythm.    Upper Extremity Exam:    Positive Tinel right cubital tunnel.  Positive right elbow flexion compression test.  Negative Tinel Guyon's canal.  Negative Tinel carpal tunnel.  Negative carpal compression test.  Tender palpation of the thumb CMC.  Positive shuck test.  Negative grind test.  Nontender over the radial styloid.  Negative Finkelstein's.  Nontender over the A1 pulley    Fingers are warm, well-perfused with appropriate capillary refill.  Palpable radial pulse.    Sensation intact to light touch in median, radial and ulnar nerve distributions.    Motor- Fires FPL, ulnar intrinsics, EPL/EDC w/ full active and passive range of motion. Strength intact.    Non-tender except for in the areas highlighted    Imaging/Studies:   Imaging Results (Last 24 Hours)       Procedure Component Value Units Date/Time    XR Hand 3+ View Right [[id number]] Resulted: 07/31/25 0953     Updated: 07/31/25 0953    Narrative:      Right Hand X-Ray    Indication: Pain    Views:  AP, Lateral, and Oblique     Comparison:  None    Findings:  No fracture  No bony lesion  Normal soft tissues  Normal joint spaces    Impression:   No acute bony abnormality right hand                     Procedures:  Procedures    Quality Measures:   ACP:   ACP discussion was deferred.    Tobacco:   Regla West  reports that she has never smoked. She has been exposed to tobacco smoke. She has never used smokeless tobacco.      Assessment / Plan    Assessment/Plan:     There are no diagnoses linked to this encounter.     Regla Westis a 39 y.o. female who presents with:      ICD-10-CM ICD-9-CM   1. Cubital tunnel syndrome on right  G56.21 354.2   2. Right hand pain  M79.641 729.5   3. Lateral epicondylitis of right elbow  M77.11 726.32         Patient presents with right upper extremity pain as well as numbness and tingling.  She has evidence of clear cubital tunnel syndrome on exam has been worsening  over the last 6 months.  She has failed nonoperative treatment with bracing and and anti-inflammatories.  Recommend EMG nerve conduction study to evaluate the severity of her cubital tunnel syndrome.    She also has evidence of right thumb CMC pain.  She has no evidence of arthritis on exam however she does have tenderness and a positive shuck test.  Discussed her diagnosis as well as further treatment options.  Recommend right thumb Comfort Cool brace.  Will likely refer her to physical therapy for activity modifications.    She also has right lateral epicondylitis.  We again reviewed diagnosis of lateral epicondylitis as well as treatment options been bracing and physical therapy.  Will await the results of EMG nerve study and depending on those again we will put in a referral for therapy.  The patient is in agreement with the plan.    Follow Up:   Return for Follow-up after EMG/NCS.        Trace Montoya MD  [facility] [place] [facility]

## 2025-08-11 ENCOUNTER — SPECIALTY PHARMACY (OUTPATIENT)
Dept: NEUROLOGY | Facility: CLINIC | Age: 40
End: 2025-08-11
Payer: COMMERCIAL

## 2025-08-18 DIAGNOSIS — G56.21 CUBITAL TUNNEL SYNDROME ON RIGHT: ICD-10-CM

## (undated) DEVICE — SUT GUT CHRM 2/0 CT1 27IN 811H

## (undated) DEVICE — MAT PREVALON MOBL TRANSFR AIR WO/PAD 39X80IN

## (undated) DEVICE — SUT VIC 2/0 CT1 27IN J339H BX/36

## (undated) DEVICE — GLV SURG BIOGEL LTX PF 6

## (undated) DEVICE — TRY SPINE BLCK WHITACRE 25G 3X5IN

## (undated) DEVICE — SUT PLAIN  3/0 CT1 27IN 842H

## (undated) DEVICE — SOL IRR H2O BTL 1000ML STRL

## (undated) DEVICE — SOL IRR NACL 0.9PCT BT 1000ML

## (undated) DEVICE — SUT GUT CHRM 1 CTX 36IN 905H

## (undated) DEVICE — SYR ART BLD GAS HEP 1CC

## (undated) DEVICE — PK C/SECT 10

## (undated) DEVICE — NDL HYPO ECLPS SFTY 18G 1 1/2IN

## (undated) DEVICE — PROXIMATE RH ROTATING HEAD SKIN STAPLERS (35 WIDE) CONTAINS 35 STAINLESS STEEL STAPLES: Brand: PROXIMATE

## (undated) DEVICE — COATED VICRYL  (POLYGLACTIN 910) SUTURE, VIOLET BRAIDED, STERILE, SYNTHETIC ABSORBABLE SUTURE: Brand: COATED VICRYL